# Patient Record
Sex: MALE | Race: WHITE | NOT HISPANIC OR LATINO | Employment: OTHER | URBAN - NONMETROPOLITAN AREA
[De-identification: names, ages, dates, MRNs, and addresses within clinical notes are randomized per-mention and may not be internally consistent; named-entity substitution may affect disease eponyms.]

---

## 2017-05-10 ENCOUNTER — APPOINTMENT (OUTPATIENT)
Dept: LAB | Facility: HOSPITAL | Age: 47
End: 2017-05-10
Attending: INTERNAL MEDICINE

## 2017-05-10 ENCOUNTER — TRANSCRIBE ORDERS (OUTPATIENT)
Dept: ADMINISTRATIVE | Facility: HOSPITAL | Age: 47
End: 2017-05-10

## 2017-05-10 DIAGNOSIS — R74.8 OTHER NONSPECIFIC ABNORMAL SERUM ENZYME LEVELS: Primary | ICD-10-CM

## 2017-05-10 DIAGNOSIS — I10 UNSPECIFIED ESSENTIAL HYPERTENSION: ICD-10-CM

## 2017-05-10 DIAGNOSIS — E78.5 HYPERLIPIDEMIA, UNSPECIFIED HYPERLIPIDEMIA TYPE: ICD-10-CM

## 2017-05-10 LAB
ALBUMIN SERPL-MCNC: 4.2 G/DL (ref 3.5–5)
ALBUMIN/GLOB SERPL: 1.4 G/DL (ref 1.1–2.5)
ALP SERPL-CCNC: 75 U/L (ref 24–120)
ALT SERPL W P-5'-P-CCNC: 35 U/L (ref 0–54)
ANION GAP SERPL CALCULATED.3IONS-SCNC: 13 MMOL/L (ref 4–13)
ARTICHOKE IGE QN: 138 MG/DL (ref 0–99)
AST SERPL-CCNC: 41 U/L (ref 7–45)
BILIRUB SERPL-MCNC: 0.7 MG/DL (ref 0.1–1)
BUN BLD-MCNC: 15 MG/DL (ref 5–21)
BUN/CREAT SERPL: 14.2 (ref 7–25)
CALCIUM SPEC-SCNC: 8.9 MG/DL (ref 8.4–10.4)
CHLORIDE SERPL-SCNC: 103 MMOL/L (ref 98–110)
CHOLEST SERPL-MCNC: 216 MG/DL (ref 130–200)
CO2 SERPL-SCNC: 24 MMOL/L (ref 24–31)
CREAT BLD-MCNC: 1.06 MG/DL (ref 0.5–1.4)
DEPRECATED RDW RBC AUTO: 46.6 FL (ref 40–54)
ERYTHROCYTE [DISTWIDTH] IN BLOOD BY AUTOMATED COUNT: 14.1 % (ref 12–15)
GFR SERPL CREATININE-BSD FRML MDRD: 74 ML/MIN/1.73
GLOBULIN UR ELPH-MCNC: 3 GM/DL
GLUCOSE BLD-MCNC: 86 MG/DL (ref 70–100)
HCT VFR BLD AUTO: 46.1 % (ref 40–52)
HDLC SERPL-MCNC: 44 MG/DL
HGB BLD-MCNC: 16.2 G/DL (ref 14–18)
LDLC/HDLC SERPL: 3.13 {RATIO}
MCH RBC QN AUTO: 32 PG (ref 28–32)
MCHC RBC AUTO-ENTMCNC: 35.1 G/DL (ref 33–36)
MCV RBC AUTO: 90.9 FL (ref 82–95)
PLATELET # BLD AUTO: 256 10*3/MM3 (ref 130–400)
PMV BLD AUTO: 10.7 FL (ref 6–12)
POTASSIUM BLD-SCNC: 4 MMOL/L (ref 3.5–5.3)
PROT SERPL-MCNC: 7.2 G/DL (ref 6.3–8.7)
RBC # BLD AUTO: 5.07 10*6/MM3 (ref 4.8–5.9)
SODIUM BLD-SCNC: 140 MMOL/L (ref 135–145)
TRIGL SERPL-MCNC: 172 MG/DL (ref 0–149)
WBC NRBC COR # BLD: 7.78 10*3/MM3 (ref 4.8–10.8)

## 2017-05-10 PROCEDURE — 80061 LIPID PANEL: CPT | Performed by: INTERNAL MEDICINE

## 2017-05-10 PROCEDURE — 36415 COLL VENOUS BLD VENIPUNCTURE: CPT | Performed by: INTERNAL MEDICINE

## 2017-05-10 PROCEDURE — 80053 COMPREHEN METABOLIC PANEL: CPT | Performed by: INTERNAL MEDICINE

## 2017-05-10 PROCEDURE — 85027 COMPLETE CBC AUTOMATED: CPT | Performed by: INTERNAL MEDICINE

## 2017-05-18 ENCOUNTER — OFFICE VISIT (OUTPATIENT)
Dept: NEUROLOGY | Facility: CLINIC | Age: 47
End: 2017-05-18

## 2017-05-18 VITALS
RESPIRATION RATE: 16 BRPM | HEART RATE: 70 BPM | WEIGHT: 196 LBS | HEIGHT: 70 IN | DIASTOLIC BLOOD PRESSURE: 72 MMHG | SYSTOLIC BLOOD PRESSURE: 120 MMHG | BODY MASS INDEX: 28.06 KG/M2

## 2017-05-18 DIAGNOSIS — I63.89 CEREBROVASCULAR ACCIDENT (CVA) DUE TO OTHER MECHANISM (HCC): Primary | ICD-10-CM

## 2017-05-18 DIAGNOSIS — G40.802 OTHER EPILEPSY WITHOUT STATUS EPILEPTICUS, NOT INTRACTABLE (HCC): ICD-10-CM

## 2017-05-18 PROCEDURE — 99213 OFFICE O/P EST LOW 20 MIN: CPT | Performed by: PSYCHIATRY & NEUROLOGY

## 2017-05-18 RX ORDER — MONTELUKAST SODIUM 10 MG/1
TABLET ORAL
Refills: 5 | COMMUNITY
Start: 2017-04-22 | End: 2023-03-16

## 2017-05-18 RX ORDER — ATORVASTATIN CALCIUM 20 MG/1
TABLET, FILM COATED ORAL
Refills: 3 | COMMUNITY
Start: 2017-05-10 | End: 2023-03-16 | Stop reason: ALTCHOICE

## 2017-05-18 RX ORDER — LABETALOL 200 MG/1
TABLET, FILM COATED ORAL
Refills: 5 | COMMUNITY
Start: 2017-04-25

## 2017-05-18 RX ORDER — FLUTICASONE PROPIONATE 50 MCG
SPRAY, SUSPENSION (ML) NASAL
Refills: 5 | COMMUNITY
Start: 2017-04-25

## 2017-05-18 RX ORDER — ENALAPRIL MALEATE 10 MG/1
TABLET ORAL
Refills: 3 | COMMUNITY
Start: 2017-05-11

## 2017-07-14 ENCOUNTER — OFFICE VISIT (OUTPATIENT)
Dept: INTERNAL MEDICINE | Age: 47
End: 2017-07-14
Payer: COMMERCIAL

## 2017-07-14 VITALS
HEIGHT: 70 IN | HEART RATE: 70 BPM | WEIGHT: 191 LBS | BODY MASS INDEX: 27.35 KG/M2 | OXYGEN SATURATION: 98 % | TEMPERATURE: 98.9 F | SYSTOLIC BLOOD PRESSURE: 120 MMHG | DIASTOLIC BLOOD PRESSURE: 70 MMHG

## 2017-07-14 DIAGNOSIS — Z23 NEED FOR PROPHYLACTIC VACCINATION AGAINST DIPHTHERIA-TETANUS-PERTUSSIS (DTP): ICD-10-CM

## 2017-07-14 DIAGNOSIS — L02.91 ABSCESS: Primary | ICD-10-CM

## 2017-07-14 DIAGNOSIS — M79.644 FINGER PAIN, RIGHT: ICD-10-CM

## 2017-07-14 DIAGNOSIS — L03.011 CELLULITIS OF FINGER OF RIGHT HAND: ICD-10-CM

## 2017-07-14 PROCEDURE — 90471 IMMUNIZATION ADMIN: CPT | Performed by: PHYSICIAN ASSISTANT

## 2017-07-14 PROCEDURE — 10060 I&D ABSCESS SIMPLE/SINGLE: CPT | Performed by: PHYSICIAN ASSISTANT

## 2017-07-14 PROCEDURE — 90715 TDAP VACCINE 7 YRS/> IM: CPT | Performed by: PHYSICIAN ASSISTANT

## 2017-07-14 RX ORDER — ATORVASTATIN CALCIUM 20 MG/1
20 TABLET, FILM COATED ORAL DAILY
COMMUNITY
Start: 2017-05-10 | End: 2018-05-24 | Stop reason: SDUPTHER

## 2017-07-14 RX ORDER — PREGABALIN 75 MG/1
75 CAPSULE ORAL DAILY
COMMUNITY
Start: 2017-05-04 | End: 2020-01-23

## 2017-07-14 RX ORDER — FLUTICASONE PROPIONATE 50 MCG
50 SPRAY, SUSPENSION (ML) NASAL
COMMUNITY
Start: 2017-04-25

## 2017-07-14 RX ORDER — MONTELUKAST SODIUM 10 MG/1
10 TABLET ORAL DAILY
COMMUNITY
Start: 2017-04-22 | End: 2022-06-01

## 2017-07-14 RX ORDER — SULFAMETHOXAZOLE AND TRIMETHOPRIM 200; 40 MG/5ML; MG/5ML
160 SUSPENSION ORAL 2 TIMES DAILY
COMMUNITY
End: 2020-01-23

## 2017-07-14 RX ORDER — VALSARTAN 80 MG/1
80 TABLET ORAL DAILY
Refills: 2 | COMMUNITY
Start: 2017-06-24 | End: 2017-12-15 | Stop reason: SDUPTHER

## 2017-07-14 RX ORDER — LABETALOL 200 MG/1
200 TABLET, FILM COATED ORAL DAILY
COMMUNITY
Start: 2017-04-25 | End: 2018-01-13 | Stop reason: SDUPTHER

## 2017-07-14 RX ORDER — ENALAPRIL MALEATE 10 MG/1
10 TABLET ORAL DAILY
COMMUNITY
Start: 2017-05-11 | End: 2018-02-15 | Stop reason: SDUPTHER

## 2017-07-14 ASSESSMENT — ENCOUNTER SYMPTOMS
NAUSEA: 0
COLOR CHANGE: 0
SORE THROAT: 0
EYE PAIN: 0
VOMITING: 0
RHINORRHEA: 0
CONSTIPATION: 0
DIARRHEA: 0
COUGH: 0
SHORTNESS OF BREATH: 0
BACK PAIN: 0
WHEEZING: 0
EYE REDNESS: 0
PHOTOPHOBIA: 0
CHEST TIGHTNESS: 0
ABDOMINAL PAIN: 0

## 2017-07-14 ASSESSMENT — PATIENT HEALTH QUESTIONNAIRE - PHQ9
1. LITTLE INTEREST OR PLEASURE IN DOING THINGS: 0
SUM OF ALL RESPONSES TO PHQ QUESTIONS 1-9: 0
2. FEELING DOWN, DEPRESSED OR HOPELESS: 0
SUM OF ALL RESPONSES TO PHQ9 QUESTIONS 1 & 2: 0

## 2017-08-07 ENCOUNTER — TELEPHONE (OUTPATIENT)
Dept: NEUROLOGY | Facility: CLINIC | Age: 47
End: 2017-08-07

## 2017-08-07 NOTE — TELEPHONE ENCOUNTER
Mr Troy called to let the office know that he has new insurance now. His old copay for lyrica was $70 per month. He does not know what the new copay will be. I did call him back and mailed him a Lyrica [employer] Pay Assistance Card. He did voice understanding.

## 2017-08-10 ENCOUNTER — TELEPHONE (OUTPATIENT)
Dept: NEUROLOGY | Facility: CLINIC | Age: 47
End: 2017-08-10

## 2017-08-10 NOTE — TELEPHONE ENCOUNTER
Mr. Troy called stating that he still hasn't received the Lyrica card. I told him that it looked like John mailed it out on 08/07/2017 and that it should arrive any day. He agreed with that and said he would just call back in a day or day if he does not get it in the mail.

## 2017-09-06 ENCOUNTER — TELEPHONE (OUTPATIENT)
Dept: NEUROLOGY | Facility: CLINIC | Age: 47
End: 2017-09-06

## 2017-09-08 ENCOUNTER — TELEPHONE (OUTPATIENT)
Dept: NEUROLOGY | Facility: CLINIC | Age: 47
End: 2017-09-08

## 2017-09-08 NOTE — TELEPHONE ENCOUNTER
Mr. Troy called to let the office know that he paid $200 for his Lyrica and can't afford that. I did let him know that I initiated a PA through his insurance.

## 2017-10-26 ENCOUNTER — TELEPHONE (OUTPATIENT)
Dept: NEUROLOGY | Facility: CLINIC | Age: 47
End: 2017-10-26

## 2017-10-26 NOTE — TELEPHONE ENCOUNTER
Patient will need to come in for follow-up to explain January evaluate for possible replacement of the Lyrica.  Patient needs to make sure he continues the Lyrica until a follow-up.  Patient has a follow-up on 11/14 but if the patient is to be worked in sooner let us know.      I did speak with Mr Troy and he did voice understanding. He did state he will see Dr Karimi at his follow up on 11/14.

## 2017-12-06 ENCOUNTER — APPOINTMENT (OUTPATIENT)
Dept: CT IMAGING | Age: 47
End: 2017-12-06

## 2017-12-06 ENCOUNTER — HOSPITAL ENCOUNTER (EMERGENCY)
Age: 47
Discharge: HOME | End: 2017-12-06
Attending: FAMILY MEDICINE

## 2017-12-06 VITALS
WEIGHT: 180 LBS | HEART RATE: 102 BPM | SYSTOLIC BLOOD PRESSURE: 131 MMHG | BODY MASS INDEX: 25.77 KG/M2 | DIASTOLIC BLOOD PRESSURE: 72 MMHG | HEIGHT: 70 IN | OXYGEN SATURATION: 95 % | RESPIRATION RATE: 18 BRPM

## 2017-12-06 DIAGNOSIS — F10.920 ACUTE ALCOHOLIC INTOXICATION WITHOUT COMPLICATION (HCC): Primary | ICD-10-CM

## 2017-12-06 LAB
ALBUMIN SERPL-MCNC: 4.5 G/DL (ref 3.5–5.2)
ALP BLD-CCNC: 101 U/L (ref 40–130)
ALT SERPL-CCNC: 104 U/L (ref 5–41)
AMMONIA: 36 UMOL/L (ref 16–60)
ANION GAP SERPL CALCULATED.3IONS-SCNC: 17 MMOL/L (ref 7–19)
AST SERPL-CCNC: 88 U/L (ref 5–40)
BASOPHILS ABSOLUTE: 0.1 K/UL (ref 0–0.2)
BASOPHILS RELATIVE PERCENT: 1.2 % (ref 0–1)
BILIRUB SERPL-MCNC: 0.4 MG/DL (ref 0.2–1.2)
BUN BLDV-MCNC: 11 MG/DL (ref 6–20)
CALCIUM SERPL-MCNC: 8.5 MG/DL (ref 8.6–10)
CHLORIDE BLD-SCNC: 100 MMOL/L (ref 98–111)
CO2: 26 MMOL/L (ref 22–29)
CREAT SERPL-MCNC: 0.8 MG/DL (ref 0.5–1.2)
EOSINOPHILS ABSOLUTE: 0.1 K/UL (ref 0–0.6)
EOSINOPHILS RELATIVE PERCENT: 1.1 % (ref 0–5)
ETHANOL: 386 MG/DL (ref 0–0.08)
GFR NON-AFRICAN AMERICAN: >60
GLUCOSE BLD-MCNC: 87 MG/DL (ref 74–109)
HCT VFR BLD CALC: 50.3 % (ref 42–52)
HEMOGLOBIN: 17.5 G/DL (ref 14–18)
LIPASE: 41 U/L (ref 13–60)
LYMPHOCYTES ABSOLUTE: 2.3 K/UL (ref 1.1–4.5)
LYMPHOCYTES RELATIVE PERCENT: 35.2 % (ref 20–40)
MCH RBC QN AUTO: 32.4 PG (ref 27–31)
MCHC RBC AUTO-ENTMCNC: 34.8 G/DL (ref 33–37)
MCV RBC AUTO: 93.1 FL (ref 80–94)
MONOCYTES ABSOLUTE: 0.4 K/UL (ref 0–0.9)
MONOCYTES RELATIVE PERCENT: 5.9 % (ref 0–10)
NEUTROPHILS ABSOLUTE: 3.7 K/UL (ref 1.5–7.5)
NEUTROPHILS RELATIVE PERCENT: 56.1 % (ref 50–65)
PDW BLD-RTO: 13.7 % (ref 11.5–14.5)
PLATELET # BLD: 232 K/UL (ref 130–400)
PMV BLD AUTO: 9.2 FL (ref 9.4–12.4)
POTASSIUM SERPL-SCNC: 4.3 MMOL/L (ref 3.5–5)
RBC # BLD: 5.4 M/UL (ref 4.7–6.1)
SODIUM BLD-SCNC: 143 MMOL/L (ref 136–145)
TOTAL PROTEIN: 7.3 G/DL (ref 6.6–8.7)
WBC # BLD: 6.6 K/UL (ref 4.8–10.8)

## 2017-12-06 PROCEDURE — 2580000003 HC RX 258: Performed by: FAMILY MEDICINE

## 2017-12-06 PROCEDURE — 93005 ELECTROCARDIOGRAM TRACING: CPT

## 2017-12-06 PROCEDURE — 83690 ASSAY OF LIPASE: CPT

## 2017-12-06 PROCEDURE — G0480 DRUG TEST DEF 1-7 CLASSES: HCPCS

## 2017-12-06 PROCEDURE — 82140 ASSAY OF AMMONIA: CPT

## 2017-12-06 PROCEDURE — 70450 CT HEAD/BRAIN W/O DYE: CPT

## 2017-12-06 PROCEDURE — 99285 EMERGENCY DEPT VISIT HI MDM: CPT

## 2017-12-06 PROCEDURE — 99283 EMERGENCY DEPT VISIT LOW MDM: CPT | Performed by: FAMILY MEDICINE

## 2017-12-06 PROCEDURE — 85025 COMPLETE CBC W/AUTO DIFF WBC: CPT

## 2017-12-06 PROCEDURE — 80053 COMPREHEN METABOLIC PANEL: CPT

## 2017-12-06 PROCEDURE — 36415 COLL VENOUS BLD VENIPUNCTURE: CPT

## 2017-12-06 RX ORDER — SODIUM CHLORIDE 9 MG/ML
INJECTION, SOLUTION INTRAVENOUS CONTINUOUS
Status: DISCONTINUED | OUTPATIENT
Start: 2017-12-06 | End: 2017-12-06 | Stop reason: HOSPADM

## 2017-12-06 RX ADMIN — SODIUM CHLORIDE: 9 INJECTION, SOLUTION INTRAVENOUS at 16:22

## 2017-12-06 RX ADMIN — SODIUM CHLORIDE: 9 INJECTION, SOLUTION INTRAVENOUS at 16:00

## 2017-12-06 ASSESSMENT — ENCOUNTER SYMPTOMS
ABDOMINAL DISTENTION: 0
COUGH: 0
SORE THROAT: 0
CONSTIPATION: 0
SHORTNESS OF BREATH: 0
ABDOMINAL PAIN: 0
CHEST TIGHTNESS: 0
BACK PAIN: 0
VOMITING: 0
WHEEZING: 0
APNEA: 0
DIARRHEA: 0
TROUBLE SWALLOWING: 0

## 2017-12-06 NOTE — ED PROVIDER NOTES
[facility] EMERGENCY DEPT  eMERGENCY dEPARTMENT eNCOUnter      Pt Name: Remi Cranker  MRN: [id number]  [place] 1970  Date of evaluation: 12/6/2017  Provider: Dewey Cano MD    [address] [place] [address]       Chief Complaint   Patient presents with    Altered Mental Status         HISTORY OF PRESENT ILLNESS   (Location/Symptom, Timing/Onset, Context/Setting, Quality, Duration, Modifying Factors, Severity)  Note limiting factors. Remi Cranker is a 50 y.o. male who presents to the emergency department . Patient complains one-day history of intermittent confusion. He is a heavy drinker. He states he has been drinking more heavily the last 24-48 hours. He denies any other complaints at this time. He denies any injury. HPI    Nursing Notes were reviewed. REVIEW OF SYSTEMS    (2-9 systems for level 4, 10 or more for level 5)     Review of Systems   Constitutional: Negative for diaphoresis and fever. HENT: Negative for sore throat and trouble swallowing. Eyes: Negative for visual disturbance. Respiratory: Negative for apnea, cough, chest tightness, shortness of breath and wheezing. Cardiovascular: Negative for chest pain, palpitations and leg swelling. Gastrointestinal: Negative for abdominal distention, abdominal pain, constipation, diarrhea and vomiting. Musculoskeletal: Negative for back pain and myalgias. Skin: Negative for pallor. Neurological: Negative for dizziness, weakness, light-headedness and headaches. Mild confusion   Psychiatric/Behavioral: Negative for behavioral problems and suicidal ideas. All other systems reviewed and are negative. PAST MEDICAL HISTORY     Past Medical History:   Diagnosis Date    Asthma     Cerebral artery occlusion with cerebral infarction ([facility] [address]     Hypertension          SURGICAL HISTORY     History reviewed. No pertinent surgical history.       CURRENT MEDICATIONS       Previous Medications    ATORVASTATIN (LIPITOR) 20 MG TABLET    Take 20 mg by mouth daily    ENALAPRIL (VASOTEC) 10 MG TABLET    Take 10 mg by mouth daily    FLUTICASONE (FLONASE) 50 MCG/ACT NASAL SPRAY    50 sprays    LABETALOL (NORMODYNE) 200 MG TABLET    Take 200 mg by mouth daily    MONTELUKAST (SINGULAIR) 10 MG TABLET    Take 10 mg by mouth daily    PREGABALIN (LYRICA) 75 MG CAPSULE    Take 75 mg by mouth daily    SULFAMETHOXAZOLE-TRIMETHOPRIM (BACTRIM;SEPTRA) 200-40 MG/5ML SUSPENSION    Take 160 mg by mouth 2 times daily    VALSARTAN (DIOVAN) 80 MG TABLET    Take 80 mg by mouth daily       ALLERGIES     Phenytoin    FAMILY HISTORY     History reviewed. No pertinent family history. SOCIAL HISTORY       Social History     Social History    Marital status: Single     Spouse name: N/A    Number of children: N/A    Years of education: N/A     Social History Main Topics    Smoking status: Never Smoker    Smokeless tobacco: None    Alcohol use None    Drug use: Unknown    Sexual activity: Not Asked     Other Topics Concern    None     Social History Narrative    None       SCREENINGS             PHYSICAL EXAM    (up to 7 for level 4, 8 or more for level 5)     ED Triage Vitals [12/06/17 1511]   BP Temp Temp src Pulse Resp SpO2 Height Weight   (!) 156/108 -- -- 98 16 95 % 5' 10\" (1.778 m) 180 lb (81.6 kg)       Physical Exam   Constitutional: He is oriented to person, place, and time. He appears well-developed and well-nourished. HENT:   Head: Normocephalic and atraumatic. Eyes: Conjunctivae and EOM are normal. Pupils are equal, round, and reactive to light. Neck: Normal range of motion. Neck supple. No tracheal deviation present. Cardiovascular: Normal rate, regular rhythm, normal heart sounds and intact distal pulses. Pulmonary/Chest: Effort normal and breath sounds normal. No respiratory distress. He has no wheezes. He has no rales. Abdominal: Soft. Bowel sounds are normal.   Musculoskeletal: Normal range of motion.    Neurological: He is alert and oriented to person, place, and time. Skin: Skin is warm and dry. Psychiatric: He has a normal mood and affect. His behavior is normal. Thought content normal.   Nursing note and vitals reviewed. DIAGNOSTIC RESULTS     EKG: All EKG's are interpreted by the Emergency Department Physician who either signs or Co-signs this chart in the absence of a cardiologist.    Normal sinus rhythm, heart rate 90, no ischemic ST-T changes    RADIOLOGY:   Non-plain film images such as CT, Ultrasound and MRI are read by the radiologist. Plain radiographic images are visualized and preliminarily interpreted by the emergency physician with the below findings:    See below      CT Head WO Contrast   Final Result   1. No acute intracranial process. Signed by Dr Lucas Ross on 12/6/2017 4:10 PM              LABS:  Labs Reviewed   CBC WITH AUTO DIFFERENTIAL - Abnormal; Notable for the following:        Result Value    MCH 32.4 (*)     MPV 9.2 (*)     Basophils % 1.2 (*)     All other components within normal limits   COMPREHENSIVE METABOLIC PANEL - Abnormal; Notable for the following:     Calcium 8.5 (*)      (*)     AST 88 (*)     All other components within normal limits   LIPASE   ETHANOL   AMMONIA   URINALYSIS   URINE DRUG SCREEN       All other labs were within normal range or not returned as of this dictation. EMERGENCY DEPARTMENT COURSE and DIFFERENTIAL DIAGNOSIS/MDM:   Vitals:    Vitals:    12/06/17 1511   BP: (!) 156/108   Pulse: 98   Resp: 16   SpO2: 95%   Weight: 180 lb (81.6 kg)   Height: 5' 10\" (1.778 m)       MDM  Number of Diagnoses or Management Options     Amount and/or Complexity of Data Reviewed  Clinical lab tests: ordered and reviewed  Tests in the radiology section of CPT®: ordered and reviewed    Risk of Complications, Morbidity, and/or Mortality  Presenting problems: low    Patient Progress  Patient progress: improved      Reassessment  Patient stable throughout ED stay. Alert and oriented ×3. He will be discharged home. Confirm that patient has a ride. Patient advised to decrease his drinking and follow-up with his primary care physician as needed. Also advised to keep an eye on his blood pressure. CONSULTS:  None    PROCEDURES:  Unless otherwise noted below, none     Procedures    FINAL IMPRESSION      1.  Acute alcoholic intoxication without complication [facility])          DISPOSITION/PLAN   DISPOSITION Decision to Discharge    PATIENT REFERRED TO:  MD Abe Dejesus [phone]    In 3 days        DISCHARGE MEDICATIONS:  New Prescriptions    No medications on file          (Please note that portions of this note were completed with a voice recognition program.  Efforts were made to edit the dictations but occasionally words are mis-transcribed.)    Ricky Bustillo MD (electronically signed)  Attending Emergency Physician          Ricky Bustillo MD  12/06/17 4775

## 2017-12-11 RX ORDER — M-VIT,TX,IRON,MINS/CALC/FOLIC 27MG-0.4MG
1 TABLET ORAL DAILY
COMMUNITY

## 2017-12-15 RX ORDER — VALSARTAN 80 MG/1
TABLET ORAL
Qty: 90 TABLET | Refills: 2 | Status: SHIPPED | OUTPATIENT
Start: 2017-12-15 | End: 2020-01-23

## 2017-12-18 ENCOUNTER — TELEPHONE (OUTPATIENT)
Dept: INTERNAL MEDICINE | Age: 47
End: 2017-12-18

## 2017-12-18 NOTE — TELEPHONE ENCOUNTER
Pharmacy has contacted Dr Karimi to request a refill for Marissa NEELY printed and reviewed by Dr Karimi.

## 2017-12-19 ENCOUNTER — TELEPHONE (OUTPATIENT)
Dept: NEUROLOGY | Facility: CLINIC | Age: 47
End: 2017-12-19

## 2018-01-15 RX ORDER — LABETALOL 200 MG/1
TABLET, FILM COATED ORAL
Qty: 30 TABLET | Refills: 5 | Status: SHIPPED | OUTPATIENT
Start: 2018-01-15 | End: 2019-02-21 | Stop reason: SDUPTHER

## 2018-01-18 ENCOUNTER — OFFICE VISIT (OUTPATIENT)
Dept: NEUROLOGY | Facility: CLINIC | Age: 48
End: 2018-01-18

## 2018-01-18 VITALS
BODY MASS INDEX: 24.92 KG/M2 | DIASTOLIC BLOOD PRESSURE: 80 MMHG | HEIGHT: 71 IN | RESPIRATION RATE: 18 BRPM | WEIGHT: 178 LBS | HEART RATE: 72 BPM | SYSTOLIC BLOOD PRESSURE: 130 MMHG

## 2018-01-18 DIAGNOSIS — G40.802 OTHER EPILEPSY WITHOUT STATUS EPILEPTICUS, NOT INTRACTABLE (HCC): Primary | ICD-10-CM

## 2018-01-18 PROCEDURE — 99214 OFFICE O/P EST MOD 30 MIN: CPT | Performed by: PSYCHIATRY & NEUROLOGY

## 2018-01-18 RX ORDER — PREGABALIN 75 MG/1
75 CAPSULE ORAL 2 TIMES DAILY
Qty: 60 CAPSULE | Refills: 2 | Status: SHIPPED | OUTPATIENT
Start: 2018-01-18 | End: 2018-12-05

## 2018-01-18 RX ORDER — VALSARTAN 40 MG/1
80 TABLET ORAL DAILY
COMMUNITY
End: 2020-10-20 | Stop reason: ALTCHOICE

## 2018-01-18 NOTE — PROGRESS NOTES
Subjective   Nathaniel Troy, 1970, is a male who is being seen today for   Chief Complaint   Patient presents with   • Stroke       HISTORY OF PRESENT ILLNESS: Patient with history of dominant CVA and focal and secondarily generalized seizures.  Patient has been on Lyrica 75 mg by mouth twice a day.  He is had to pay over $400 a month for it.  We are trying to get it preapproved again.  If not approved we are going to have to change medications to possibly Keppra.  Side effects should were discussed in detail for the Keppra.  Patient is had no headaches no seizures.  Patient has not had any blood work done over a year.    REVIEW OF SYSTEMS:   GENERAL: As above  PULMONARY: No acute shortness of breath  CVS:  No chest pain or palpitation  GASTROINTESTINAL: No acute GI distress  GENITOURINARY: No acute  distress  GYN: Not applicable  MUSCULOSKELETAL: No acute musculoskeletal symptoms  HEENT:  No acute vision or hearing change  ENDOCRINE:  No acute endocrine changes  PSYCHIATRIC: No acute psychiatric symptoms  HEMATOLOGY: No blood work available  SKIN: No skin changes  Family history reviewed and otherwise noncontributory  Social history: Patient denies smoking or drug or alcohol use    PHYSICAL EXAMINATION:    GENERAL: No acute distress  CRANIUM: Normocephalic/atraumatic  HEENT: No acute fundic abnormalities.  Pupils equal round reactive to light.       EYES: EOMs intact without nystagmus and fields full to confrontation       EARS:  Tympanic membranes normal hears tuning fork bilaterally       THROAT: No oropharynx abnormalities       NECK:  No bruits/no lymphadenopathy  CHEST: No acute cardiopulmonary abnormalities by auscultation  ABDOMEN: Nondistended  EXTREMITIES: Pulses symmetrical  NEURO: Patient alert and follows commands without difficulty  SPEECH:  Normal    CRANIAL NERVES:  Motor sensory about the face normal and symmetric    MOTOR STRENGTH:  Motor strength upper and lower extremities normal  STATION  AND GAIT:  Gait normal/Romberg negative  CEREBELLAR:  Finger-nose and heel shin normal  SENSORY:  Pin and vibration upper and lower extremities normal  REFLEXES:  Reflexes normal upper and lower extremities without Babinski's      ASSESSMENT AND PLAN:  Patient with history of CVA with history of focal secondary generalized seizures.  Patient to get blood work and continue Lyrica if approved.      Nathaniel was seen today for stroke.    Diagnoses and all orders for this visit:    Other epilepsy without status epilepticus, not intractable  -     CBC & Differential; Future  -     Comprehensive Metabolic Panel; Future    Other orders  -     pregabalin (LYRICA) 75 MG capsule; Take 1 capsule by mouth 2 (Two) Times a Day.

## 2018-01-22 ENCOUNTER — TELEPHONE (OUTPATIENT)
Dept: NEUROLOGY | Facility: CLINIC | Age: 48
End: 2018-01-22

## 2018-01-22 NOTE — TELEPHONE ENCOUNTER
I spoke with Nathaniel Troy regarding the denial of his Lyrica. I let him know that Dr. Karimi is going to start him on Keppra; however, the blood work that was ordered on 01/18/2018 has to be completed before the Keppra can be started. He said that he planned to have these done (tomorrow) 01/23/2018. I told him that as soon as the results are reviewed, Dr. Karimi will call in the Keppra and his dose of Lyrica will change to 1 Capsule by mouth once daily for 1 week. And the Keppra will be 500 mg by mouth twice daily for 2 weeks. At 2 weeks, there will be a Keppra level ordered for him and with those results, the dose may change again. I explained that whoever calls him, in the next few days, will go over all this information again.    He expressed understanding and will let us know when he goes tomorrow to have his blood work completed.

## 2018-01-30 ENCOUNTER — LAB (OUTPATIENT)
Dept: LAB | Facility: HOSPITAL | Age: 48
End: 2018-01-30
Attending: PSYCHIATRY & NEUROLOGY

## 2018-01-30 DIAGNOSIS — G40.802 OTHER EPILEPSY WITHOUT STATUS EPILEPTICUS, NOT INTRACTABLE (HCC): ICD-10-CM

## 2018-01-30 LAB
ALBUMIN SERPL-MCNC: 4 G/DL (ref 3.5–5)
ALBUMIN/GLOB SERPL: 1.6 G/DL (ref 1.1–2.5)
ALP SERPL-CCNC: 125 U/L (ref 24–120)
ALT SERPL W P-5'-P-CCNC: 72 U/L (ref 0–54)
ANION GAP SERPL CALCULATED.3IONS-SCNC: 10 MMOL/L (ref 4–13)
AST SERPL-CCNC: 74 U/L (ref 7–45)
BASOPHILS # BLD AUTO: 0.15 10*3/MM3 (ref 0–0.2)
BASOPHILS NFR BLD AUTO: 2.3 % (ref 0–2)
BILIRUB SERPL-MCNC: 0.7 MG/DL (ref 0.1–1)
BUN BLD-MCNC: 15 MG/DL (ref 5–21)
BUN/CREAT SERPL: 19.7 (ref 7–25)
CALCIUM SPEC-SCNC: 9 MG/DL (ref 8.4–10.4)
CHLORIDE SERPL-SCNC: 106 MMOL/L (ref 98–110)
CO2 SERPL-SCNC: 25 MMOL/L (ref 24–31)
CREAT BLD-MCNC: 0.76 MG/DL (ref 0.5–1.4)
DEPRECATED RDW RBC AUTO: 47.8 FL (ref 40–54)
EOSINOPHIL # BLD AUTO: 0.78 10*3/MM3 (ref 0–0.7)
EOSINOPHIL NFR BLD AUTO: 11.9 % (ref 0–4)
ERYTHROCYTE [DISTWIDTH] IN BLOOD BY AUTOMATED COUNT: 13.4 % (ref 12–15)
GFR SERPL CREATININE-BSD FRML MDRD: 109 ML/MIN/1.73
GLOBULIN UR ELPH-MCNC: 2.5 GM/DL
GLUCOSE BLD-MCNC: 80 MG/DL (ref 70–100)
HCT VFR BLD AUTO: 45.3 % (ref 40–52)
HGB BLD-MCNC: 15 G/DL (ref 14–18)
IMM GRANULOCYTES # BLD: 0.03 10*3/MM3 (ref 0–0.03)
IMM GRANULOCYTES NFR BLD: 0.5 % (ref 0–5)
LYMPHOCYTES # BLD AUTO: 1.69 10*3/MM3 (ref 0.72–4.86)
LYMPHOCYTES NFR BLD AUTO: 25.8 % (ref 15–45)
MCH RBC QN AUTO: 32.1 PG (ref 28–32)
MCHC RBC AUTO-ENTMCNC: 33.1 G/DL (ref 33–36)
MCV RBC AUTO: 97 FL (ref 82–95)
MONOCYTES # BLD AUTO: 0.66 10*3/MM3 (ref 0.19–1.3)
MONOCYTES NFR BLD AUTO: 10.1 % (ref 4–12)
NEUTROPHILS # BLD AUTO: 3.23 10*3/MM3 (ref 1.87–8.4)
NEUTROPHILS NFR BLD AUTO: 49.4 % (ref 39–78)
NRBC BLD MANUAL-RTO: 0 /100 WBC (ref 0–0)
PLATELET # BLD AUTO: 220 10*3/MM3 (ref 130–400)
PMV BLD AUTO: 10.5 FL (ref 6–12)
POTASSIUM BLD-SCNC: 4.5 MMOL/L (ref 3.5–5.3)
PROT SERPL-MCNC: 6.5 G/DL (ref 6.3–8.7)
RBC # BLD AUTO: 4.67 10*6/MM3 (ref 4.8–5.9)
SODIUM BLD-SCNC: 141 MMOL/L (ref 135–145)
WBC NRBC COR # BLD: 6.54 10*3/MM3 (ref 4.8–10.8)

## 2018-01-30 PROCEDURE — 80053 COMPREHEN METABOLIC PANEL: CPT | Performed by: PSYCHIATRY & NEUROLOGY

## 2018-01-30 PROCEDURE — 85025 COMPLETE CBC W/AUTO DIFF WBC: CPT | Performed by: PSYCHIATRY & NEUROLOGY

## 2018-01-30 PROCEDURE — 36415 COLL VENOUS BLD VENIPUNCTURE: CPT

## 2018-02-01 ENCOUNTER — TELEPHONE (OUTPATIENT)
Dept: NEUROLOGY | Facility: CLINIC | Age: 48
End: 2018-02-01

## 2018-02-05 DIAGNOSIS — Z00.00 EXAMINATION, MEDICAL, GENERAL: Primary | ICD-10-CM

## 2018-02-15 RX ORDER — ENALAPRIL MALEATE 10 MG/1
TABLET ORAL
Qty: 180 TABLET | Refills: 2 | Status: SHIPPED | OUTPATIENT
Start: 2018-02-15 | End: 2019-03-13 | Stop reason: SDUPTHER

## 2018-02-19 LAB
EKG P AXIS: 59 DEGREES
EKG P-R INTERVAL: 136 MS
EKG Q-T INTERVAL: 354 MS
EKG QRS DURATION: 84 MS
EKG QTC CALCULATION (BAZETT): 413 MS
EKG T AXIS: 57 DEGREES

## 2018-03-29 ENCOUNTER — TELEPHONE (OUTPATIENT)
Dept: INTERNAL MEDICINE | Age: 48
End: 2018-03-29

## 2018-04-04 ENCOUNTER — TELEPHONE (OUTPATIENT)
Dept: INTERNAL MEDICINE | Age: 48
End: 2018-04-04

## 2018-05-22 ENCOUNTER — TELEPHONE (OUTPATIENT)
Dept: INTERNAL MEDICINE | Age: 48
End: 2018-05-22

## 2018-05-22 RX ORDER — LEVETIRACETAM 500 MG/1
500 TABLET ORAL 2 TIMES DAILY
COMMUNITY
Start: 2018-05-09 | End: 2021-09-01

## 2018-08-09 RX ORDER — LEVETIRACETAM 500 MG/1
TABLET ORAL
Qty: 60 TABLET | Refills: 0 | Status: SHIPPED | OUTPATIENT
Start: 2018-08-09 | End: 2018-10-26 | Stop reason: SDUPTHER

## 2018-10-26 ENCOUNTER — DOCUMENTATION (OUTPATIENT)
Dept: NEUROLOGY | Facility: CLINIC | Age: 48
End: 2018-10-26

## 2018-10-26 ENCOUNTER — TELEPHONE (OUTPATIENT)
Dept: NEUROLOGY | Facility: CLINIC | Age: 48
End: 2018-10-26

## 2018-10-26 DIAGNOSIS — G40.802 OTHER EPILEPSY WITHOUT STATUS EPILEPTICUS, NOT INTRACTABLE (HCC): Primary | ICD-10-CM

## 2018-10-26 RX ORDER — LEVETIRACETAM 500 MG/1
TABLET ORAL
Qty: 60 TABLET | Refills: 0 | Status: SHIPPED | OUTPATIENT
Start: 2018-10-26 | End: 2018-11-30 | Stop reason: SDUPTHER

## 2018-10-26 NOTE — TELEPHONE ENCOUNTER
----- Message from Edis Karimi MD sent at 10/26/2018  1:23 PM CDT -----  Contact patient that we will give the patient 1 more month of Keppra but we need to get him in to a cancellation follow-up.  Patient can follow-up with Irene.  Patient also needs to get blood work checking a Keppra level and CBC and CMP which I put in epic.  Did patient have her follow up with PCP about his liver enzyme abnormalities?

## 2018-10-26 NOTE — PROGRESS NOTES
Patient according to his conversation with a nurse in our office missed multiple appointments because of family matters.  Patient is given 1 more month of the Keppra and is going to be made a follow-up within the next month.  Patient has not gotten his blood work and is set up for a Keppra level /CBC and CMP.  He was to follow with his PCP on his liver enzyme abnormalities.

## 2018-10-26 NOTE — TELEPHONE ENCOUNTER
Nathaniel said he missed his last 2 appointments because he had some family issues.  He scheduled an appointment in January.

## 2018-10-26 NOTE — TELEPHONE ENCOUNTER
"I spoke with Nathaniel and told him we were able to get him in on 11/7/2018 at 3:00 PM. He said that he will be there. I mentioned the importance of coming in because he has \"no-showed\" several appointments in the past and he got very defensive stating that he couldn't help all those missed ones because his mother had passed and had other family situations that came up on those other dates/times that prevented him to to make it to the other missed ones. He then stated that he \"didnt want to hear of any of that nonsense for missing appointments\". I told him again that it was important if he wanted to get his medicine refilled. He said \"okay\" and I also reminded him to go to the labs a few days before his appointment and he stated that he would and hung up.   "

## 2018-10-26 NOTE — TELEPHONE ENCOUNTER
Appointment scheduled 11-7-18.  Nathaniel said he is in the [place] right now.  He will contact his PCP for a follow up as soon as possible to discuss his liver enzymes.

## 2018-11-12 ENCOUNTER — LAB (OUTPATIENT)
Dept: LAB | Facility: HOSPITAL | Age: 48
End: 2018-11-12
Attending: PSYCHIATRY & NEUROLOGY

## 2018-11-12 DIAGNOSIS — G40.802 OTHER EPILEPSY WITHOUT STATUS EPILEPTICUS, NOT INTRACTABLE (HCC): ICD-10-CM

## 2018-11-12 LAB
ALBUMIN SERPL-MCNC: 4.3 G/DL (ref 3.5–5)
ALBUMIN/GLOB SERPL: 1.7 G/DL (ref 1.1–2.5)
ALP SERPL-CCNC: 122 U/L (ref 24–120)
ALT SERPL W P-5'-P-CCNC: 63 U/L (ref 0–54)
ANION GAP SERPL CALCULATED.3IONS-SCNC: 9 MMOL/L (ref 4–13)
AST SERPL-CCNC: 100 U/L (ref 7–45)
BASOPHILS # BLD AUTO: 0.07 10*3/MM3 (ref 0–0.2)
BASOPHILS NFR BLD AUTO: 1.3 % (ref 0–2)
BILIRUB SERPL-MCNC: 0.9 MG/DL (ref 0.1–1)
BUN BLD-MCNC: 12 MG/DL (ref 5–21)
BUN/CREAT SERPL: 16.2 (ref 7–25)
CALCIUM SPEC-SCNC: 9.1 MG/DL (ref 8.4–10.4)
CHLORIDE SERPL-SCNC: 103 MMOL/L (ref 98–110)
CO2 SERPL-SCNC: 27 MMOL/L (ref 24–31)
CREAT BLD-MCNC: 0.74 MG/DL (ref 0.5–1.4)
DEPRECATED RDW RBC AUTO: 47.9 FL (ref 40–54)
EOSINOPHIL # BLD AUTO: 0.2 10*3/MM3 (ref 0–0.7)
EOSINOPHIL NFR BLD AUTO: 3.7 % (ref 0–4)
ERYTHROCYTE [DISTWIDTH] IN BLOOD BY AUTOMATED COUNT: 12.9 % (ref 12–15)
GFR SERPL CREATININE-BSD FRML MDRD: 112 ML/MIN/1.73
GLOBULIN UR ELPH-MCNC: 2.6 GM/DL
GLUCOSE BLD-MCNC: 97 MG/DL (ref 70–100)
HCT VFR BLD AUTO: 41.7 % (ref 40–52)
HGB BLD-MCNC: 14 G/DL (ref 14–18)
IMM GRANULOCYTES # BLD: 0.02 10*3/MM3 (ref 0–0.03)
IMM GRANULOCYTES NFR BLD: 0.4 % (ref 0–5)
LYMPHOCYTES # BLD AUTO: 1.07 10*3/MM3 (ref 0.72–4.86)
LYMPHOCYTES NFR BLD AUTO: 19.6 % (ref 15–45)
MCH RBC QN AUTO: 33.8 PG (ref 28–32)
MCHC RBC AUTO-ENTMCNC: 33.6 G/DL (ref 33–36)
MCV RBC AUTO: 100.7 FL (ref 82–95)
MONOCYTES # BLD AUTO: 0.59 10*3/MM3 (ref 0.19–1.3)
MONOCYTES NFR BLD AUTO: 10.8 % (ref 4–12)
NEUTROPHILS # BLD AUTO: 3.52 10*3/MM3 (ref 1.87–8.4)
NEUTROPHILS NFR BLD AUTO: 64.2 % (ref 39–78)
NRBC BLD MANUAL-RTO: 0 /100 WBC (ref 0–0)
PLATELET # BLD AUTO: 158 10*3/MM3 (ref 130–400)
PMV BLD AUTO: 10.4 FL (ref 6–12)
POTASSIUM BLD-SCNC: 4.4 MMOL/L (ref 3.5–5.3)
PROT SERPL-MCNC: 6.9 G/DL (ref 6.3–8.7)
RBC # BLD AUTO: 4.14 10*6/MM3 (ref 4.8–5.9)
SODIUM BLD-SCNC: 139 MMOL/L (ref 135–145)
WBC NRBC COR # BLD: 5.47 10*3/MM3 (ref 4.8–10.8)

## 2018-11-12 PROCEDURE — 80053 COMPREHEN METABOLIC PANEL: CPT | Performed by: PSYCHIATRY & NEUROLOGY

## 2018-11-12 PROCEDURE — 80177 DRUG SCRN QUAN LEVETIRACETAM: CPT | Performed by: PSYCHIATRY & NEUROLOGY

## 2018-11-12 PROCEDURE — 85025 COMPLETE CBC W/AUTO DIFF WBC: CPT | Performed by: PSYCHIATRY & NEUROLOGY

## 2018-11-12 PROCEDURE — 36415 COLL VENOUS BLD VENIPUNCTURE: CPT

## 2018-11-15 LAB — LEVETIRACETAM SERPL-MCNC: 15.5 UG/ML (ref 10–40)

## 2018-12-03 RX ORDER — LEVETIRACETAM 500 MG/1
TABLET ORAL
Qty: 60 TABLET | Refills: 0 | Status: SHIPPED | OUTPATIENT
Start: 2018-12-03 | End: 2018-12-05 | Stop reason: SDUPTHER

## 2018-12-05 ENCOUNTER — OFFICE VISIT (OUTPATIENT)
Dept: NEUROLOGY | Facility: CLINIC | Age: 48
End: 2018-12-05

## 2018-12-05 VITALS
BODY MASS INDEX: 23.52 KG/M2 | RESPIRATION RATE: 18 BRPM | WEIGHT: 168 LBS | HEART RATE: 74 BPM | HEIGHT: 71 IN | DIASTOLIC BLOOD PRESSURE: 68 MMHG | SYSTOLIC BLOOD PRESSURE: 134 MMHG

## 2018-12-05 DIAGNOSIS — G40.409 OTHER GENERALIZED EPILEPSY, NOT INTRACTABLE, WITHOUT STATUS EPILEPTICUS (HCC): Primary | ICD-10-CM

## 2018-12-05 PROCEDURE — 99214 OFFICE O/P EST MOD 30 MIN: CPT | Performed by: PSYCHIATRY & NEUROLOGY

## 2018-12-05 RX ORDER — LEVETIRACETAM 500 MG/1
500 TABLET ORAL 2 TIMES DAILY
Qty: 60 TABLET | Refills: 5 | Status: SHIPPED | OUTPATIENT
Start: 2018-12-05 | End: 2019-09-01 | Stop reason: SDUPTHER

## 2018-12-05 NOTE — PATIENT INSTRUCTIONS
To continue seizure precautions and driving precautions as previously discussed.  Patient to get with PCP about elevated liver enzymes and MCV on CBC.

## 2018-12-05 NOTE — PROGRESS NOTES
"Subjective   Nathaniel Troy, 1970, is a male who is being seen today for   Chief Complaint   Patient presents with   • Stroke       HISTORY OF PRESENT ILLNESS: Extended follow-up.  Patient has not been seen in almost a year.  Patient has had no seizures and no headaches.  Patient had no stroke symptoms.  Patient was switched over from Lyrica to Keppra 500 by mouth twice a day because of the cost of the Lyrica.  Patient is tolerating the Keppra without difficulty.  Patient has been through a lot of stress with his family.  Patient says his not sleeping as well as he should because he is drinking coffee.  Patient is \"taking too many vitamins\".  Patient has not seen his PCP lately.  Patient had some further elevation of AST at 74 to 100 in the most recent testing ,ALT has decreased from 72-63.  Patient has not been working.  Patient  had no trouble driving.  I again reviewed driving precautions and safety precautions/seizure precautions.    REVIEW OF SYSTEMS:   GENERAL: Blood pressure 134/68 left arm seated with pulse 74.  Patient's blood pressure standing 132/72  PULMONARY: No acute respiratory difficulties  CVS:  No acute chest pain or palpitation  GASTROINTESTINAL: As above  GENITOURINARY: No acute  distress  GYN: Not applicable  MUSCULOSKELETAL: No acute musculoskeletal symptoms  HEENT:  No acute vision or hearing change  ENDOCRINE:  No acute endocrine symptoms  PSYCHIATRIC: As above  HEMATOLOGY: No anemia  SKIN: No skin changes  Family history positive for tremor but otherwise noncontributory  Social history: Patient denies smoking or drug or alcohol use      PHYSICAL EXAMINATION:    GENERAL: Patient has intentional tremor.  Patient relates that to his lack of sleep and coffee.  No cogwheeling or rigidity noted no rest tremor noted  CRANIUM: Normocephalic/atraumatic  HEENT: No acute fundic abnormalities.  Pupils equal round reactive to light.       EYES: EOMs intact without nystagmus and fields full to " confrontation       EARS:  Tympanic membranes normal and hears tuning fork bilaterally       THROAT: No oropharynx abnormalities       NECK:  No bruits/no lymphadenopathy  CHEST: No acute cardiopulmonary abnormalities by auscultation  ABDOMEN: Nondistended  EXTREMITIES: Pulses symmetrical.  NEURO: Patient alert and follows commands without difficulty  SPEECH:  Normal    CRANIAL NERVES:  Motor sensory about the face normal and symmetric    MOTOR STRENGTH:  Motor strength upper and lower extremities normal  STATION AND GAIT:  Gait normal/Romberg negative.  Patient has no significant difficulty with tandem walk  CEREBELLAR:  Finger-nose and heel shin normal within the tremor  SENSORY:  Pin and vibration upper and lower extremities normal  REFLEXES:  Reflexes present and symmetric upper and lower extremities without Babinski's or clonus      ASSESSMENT AND PLAN:  Patient with history of dominant CVA with hemorrhagic component.  Patient had his seizure risk after that and has been on previously Lyrica switched to Keppra.  Patient tolerating the Keppra with no seizures.  Patient to work on tapering caffeine and get better sleep habits.  Patient is to get with PCP about liver enzyme abnormalities and elevated MCV.  I spent 25 minutes with this patient with 15 minutes counseling. Patient's Body mass index is 23.43 kg/m². BMI is within normal parameters. No follow-up required.      Nathaniel was seen today for stroke.    Diagnoses and all orders for this visit:    Other generalized epilepsy, not intractable, without status epilepticus (CMS/HCC)  -     CBC & Differential; Future  -     Comprehensive Metabolic Panel; Future  -     Levetiracetam Level (Keppra); Future    Other orders  -     levETIRAcetam (KEPPRA) 500 MG tablet; Take 1 tablet by mouth 2 (Two) Times a Day.

## 2018-12-12 DIAGNOSIS — Z00.00 EXAMINATION, MEDICAL, GENERAL: ICD-10-CM

## 2018-12-12 LAB
ALBUMIN SERPL-MCNC: 3.7 G/DL (ref 3.5–5.2)
ALP BLD-CCNC: 96 U/L (ref 40–130)
ALT SERPL-CCNC: 30 U/L (ref 5–41)
ANION GAP SERPL CALCULATED.3IONS-SCNC: 15 MMOL/L (ref 7–19)
AST SERPL-CCNC: 25 U/L (ref 5–40)
BILIRUB SERPL-MCNC: 0.3 MG/DL (ref 0.2–1.2)
BUN BLDV-MCNC: 8 MG/DL (ref 6–20)
CALCIUM SERPL-MCNC: 8.9 MG/DL (ref 8.6–10)
CHLORIDE BLD-SCNC: 107 MMOL/L (ref 98–111)
CHOLESTEROL, TOTAL: 155 MG/DL (ref 160–199)
CO2: 24 MMOL/L (ref 22–29)
CREAT SERPL-MCNC: 0.8 MG/DL (ref 0.5–1.2)
GFR NON-AFRICAN AMERICAN: >60
GLUCOSE BLD-MCNC: 98 MG/DL (ref 74–109)
HCT VFR BLD CALC: 43.5 % (ref 42–52)
HDLC SERPL-MCNC: 67 MG/DL (ref 55–121)
HEMOGLOBIN: 13.8 G/DL (ref 14–18)
LDL CHOLESTEROL CALCULATED: 73 MG/DL
MCH RBC QN AUTO: 33.3 PG (ref 27–31)
MCHC RBC AUTO-ENTMCNC: 31.7 G/DL (ref 33–37)
MCV RBC AUTO: 104.8 FL (ref 80–94)
PDW BLD-RTO: 13.6 % (ref 11.5–14.5)
PLATELET # BLD: 231 K/UL (ref 130–400)
PMV BLD AUTO: 10.3 FL (ref 9.4–12.4)
POTASSIUM SERPL-SCNC: 4.5 MMOL/L (ref 3.5–5)
RBC # BLD: 4.15 M/UL (ref 4.7–6.1)
SODIUM BLD-SCNC: 146 MMOL/L (ref 136–145)
TOTAL PROTEIN: 6.1 G/DL (ref 6.6–8.7)
TRIGL SERPL-MCNC: 76 MG/DL (ref 0–149)
TSH SERPL DL<=0.05 MIU/L-ACNC: 7.11 UIU/ML (ref 0.27–4.2)
WBC # BLD: 6.7 K/UL (ref 4.8–10.8)

## 2018-12-14 ENCOUNTER — OFFICE VISIT (OUTPATIENT)
Dept: INTERNAL MEDICINE | Age: 48
End: 2018-12-14
Payer: COMMERCIAL

## 2018-12-14 VITALS
TEMPERATURE: 98.6 F | HEIGHT: 71 IN | DIASTOLIC BLOOD PRESSURE: 82 MMHG | SYSTOLIC BLOOD PRESSURE: 130 MMHG | HEART RATE: 60 BPM | OXYGEN SATURATION: 98 % | BODY MASS INDEX: 26.39 KG/M2 | WEIGHT: 188.5 LBS

## 2018-12-14 DIAGNOSIS — E78.2 MIXED HYPERLIPIDEMIA: ICD-10-CM

## 2018-12-14 DIAGNOSIS — E03.8 SUBCLINICAL HYPOTHYROIDISM: ICD-10-CM

## 2018-12-14 DIAGNOSIS — Z12.5 SCREENING FOR PROSTATE CANCER: ICD-10-CM

## 2018-12-14 DIAGNOSIS — I10 ESSENTIAL HYPERTENSION: ICD-10-CM

## 2018-12-14 DIAGNOSIS — K76.0 FATTY LIVER: ICD-10-CM

## 2018-12-14 DIAGNOSIS — I63.50 CEREBRAL ARTERY OCCLUSION WITH CEREBRAL INFARCTION (HCC): ICD-10-CM

## 2018-12-14 DIAGNOSIS — Z00.00 ROUTINE GENERAL MEDICAL EXAMINATION AT A HEALTH CARE FACILITY: Primary | ICD-10-CM

## 2018-12-14 PROCEDURE — 99214 OFFICE O/P EST MOD 30 MIN: CPT | Performed by: PHYSICIAN ASSISTANT

## 2018-12-14 RX ORDER — SIMVASTATIN 40 MG
40 TABLET ORAL NIGHTLY
COMMUNITY
End: 2019-09-17 | Stop reason: SDUPTHER

## 2018-12-14 RX ORDER — ASPIRIN 325 MG
325 TABLET ORAL DAILY
COMMUNITY
End: 2020-11-11

## 2018-12-14 ASSESSMENT — ENCOUNTER SYMPTOMS
BACK PAIN: 0
RHINORRHEA: 0
EYE PAIN: 0
COLOR CHANGE: 0
PHOTOPHOBIA: 0
SORE THROAT: 0
CHEST TIGHTNESS: 0
COUGH: 0
SINUS PRESSURE: 0
ABDOMINAL PAIN: 0
SHORTNESS OF BREATH: 0
VOMITING: 0
CONSTIPATION: 0
EYE REDNESS: 0
WHEEZING: 0
DIARRHEA: 0
NAUSEA: 0

## 2018-12-14 ASSESSMENT — PATIENT HEALTH QUESTIONNAIRE - PHQ9
1. LITTLE INTEREST OR PLEASURE IN DOING THINGS: 0
SUM OF ALL RESPONSES TO PHQ QUESTIONS 1-9: 0
SUM OF ALL RESPONSES TO PHQ9 QUESTIONS 1 & 2: 0
2. FEELING DOWN, DEPRESSED OR HOPELESS: 0
SUM OF ALL RESPONSES TO PHQ QUESTIONS 1-9: 0

## 2018-12-14 NOTE — PROGRESS NOTES
[facility]  [address] [address]  Dept: [phone]  Dept Fax: [phone]  Loc: [phone]      [facility] INTER[facility] MEDICINE  OFFICE NOTE      Chief Complaint   Patient presents with    Other     physical , swelling in both feet and ankles for a couple of days now        Maximilian Conrad is a 46y.o. year old male who is seen for Physical.    Patient continues to follow-up with Dr. Odalis Trevino with a history of stroke and possible seizures. Patient has not had any recent seizures. Patient denies any headaches. Patient is on Keppra 500 mg twice a day. Patient still under precautions with driving. Patient states compliant with medication. Patient was just seen by Dr. Odalis Trevino on 5 December 2018. Patient last saw Dr. Odalis Trevino his labs done 2 weeks prior and he had elevated AST of 100 and ALT of 63. His liver enzymes have returned to normal.    Patient's blood pressure seems to be stable at this time. Patient's current medication regiment seems to be adequate. Patient denies any chest pain, shortness of breath, palpitations. Patient states compliant with taking medication. Patient's currently on Diovan 80 mg daily and labetalol 100 mg every day, and enalapril 10 mg twice a day. Patient's hyperlipidemia seems to be fairly well controlled on current medication regimen continue as directed. Patient denies any side effects from lipid lowering medication. Patient states trying to properly eat and exercise as directed. Patient's taking Zocor 40 mg daily. Patient states gets some swelling in his feet and ankles last couple days. Patient states his try to cut back on salt doesn't happen all the time. Patient denies any chest pain or shortness of breath. Patient states he dislikes is some dependent edema. He will continue to monitor. Patient's TSH was mildly elevated up around 7. Patient is subclinical hypothyroidism.   We will CAPS Take 1 capsule by mouth daily Yes Historical Provider, MD   Multiple Vitamins-Minerals (THERAPEUTIC MULTIVITAMIN-MINERALS) tablet Take 1 tablet by mouth daily Yes Historical Provider, MD   fluticasone (FLONASE) 50 MCG/ACT nasal spray 50 sprays Yes Historical Provider, MD   montelukast (SINGULAIR) 10 MG tablet Take 10 mg by mouth daily Yes Historical Provider, MD   Willa, Zingiber officinalis, (WILLA ROOT) 250 MG CAPS Take 6 capsules by mouth daily  Historical Provider, MD   pregabalin (LYRICA) 75 MG capsule Take 75 mg by mouth daily  Historical Provider, MD   sulfamethoxazole-trimethoprim (BACTRIM;SEPTRA) 200-40 MG/5ML suspension Take 160 mg by mouth 2 times daily  Historical Provider, MD       Past Surgical History:   Procedure Laterality Date    CHOLECYSTECTOMY      with cholangiography 1/0//06 Dr Vaughn Boogie       History reviewed. No pertinent family history. Allergies   Allergen Reactions    Phenytoin      rash       Social History     Social History    Marital status: Single     Spouse name: N/A    Number of children: N/A    Years of education: N/A     Occupational History    Not on file. Social History Main Topics    Smoking status: Never Smoker    Smokeless tobacco: Never Used    Alcohol use No    Drug use: No    Sexual activity: Not on file     Other Topics Concern    Not on file     Social History Narrative    No narrative on file       Review of Systems  Review of Systems   Constitutional: Negative for activity change, appetite change, fatigue and unexpected weight change. HENT: Negative for congestion, ear pain, postnasal drip, rhinorrhea, sinus pressure, sneezing and sore throat. Eyes: Negative for photophobia, pain and redness. Respiratory: Negative for cough, chest tightness, shortness of breath and wheezing. Cardiovascular: Positive for leg swelling. Negative for chest pain and palpitations.    Gastrointestinal: Negative for abdominal pain, constipation, diarrhea, nausea and vomiting. Genitourinary: Negative for dysuria, frequency, hematuria and urgency. Musculoskeletal: Negative for arthralgias, back pain, gait problem, joint swelling and myalgias. Skin: Negative for color change, pallor and wound. Neurological: Negative for dizziness, speech difficulty, weakness, light-headedness, numbness and headaches. Hematological: Negative for adenopathy. Does not bruise/bleed easily. Psychiatric/Behavioral: Negative for confusion. The patient is not nervous/anxious. Current Outpatient Prescriptions   Medication Sig Dispense Refill    aspirin 325 MG tablet Take 325 mg by mouth daily      simvastatin (ZOCOR) 40 MG tablet Take 40 mg by mouth nightly      atorvastatin (LIPITOR) 20 MG tablet TAKE 1 TABLET DAILY. 30 tablet 0    levETIRAcetam (KEPPRA) 500 MG tablet Take 500 mg by mouth 2 times daily       enalapril (VASOTEC) 10 MG tablet TAKE 1 TABLET BY MOUTH TWICE A  tablet 2    labetalol (NORMODYNE) 200 MG tablet TAKE 1 TABLET BY MOUTH EVERY DAY 30 tablet 5    valsartan (DIOVAN) 80 MG tablet TAKE 1 TABLET BY MOUTH EVERY DAY 90 tablet 2    Capsicum-Garlic (CAYENNE PLUS GARLIC) 199-050 MG CAPS Take 1 capsule by mouth daily      Multiple Vitamins-Minerals (THERAPEUTIC MULTIVITAMIN-MINERALS) tablet Take 1 tablet by mouth daily      fluticasone (FLONASE) 50 MCG/ACT nasal spray 50 sprays      montelukast (SINGULAIR) 10 MG tablet Take 10 mg by mouth daily      Willa, Zingiber officinalis, (WILLA ROOT) 250 MG CAPS Take 6 capsules by mouth daily      pregabalin (LYRICA) 75 MG capsule Take 75 mg by mouth daily      sulfamethoxazole-trimethoprim (BACTRIM;SEPTRA) 200-40 MG/5ML suspension Take 160 mg by mouth 2 times daily       No current facility-administered medications for this visit.         /82   Pulse 60   Temp 98.6 °F (37 °C)   Ht 5' 11\" (1.803 m)   Wt 188 lb 8 oz (85.5 kg)   SpO2 98%   BMI 26.29 kg/m²     PHYSICAL EXAM  Physical Exam

## 2019-02-24 RX ORDER — LABETALOL 200 MG/1
TABLET, FILM COATED ORAL
Qty: 30 TABLET | Refills: 5 | Status: SHIPPED | OUTPATIENT
Start: 2019-02-24 | End: 2019-11-21 | Stop reason: SDUPTHER

## 2019-03-13 RX ORDER — ENALAPRIL MALEATE 10 MG/1
TABLET ORAL
Qty: 180 TABLET | Refills: 2 | Status: SHIPPED | OUTPATIENT
Start: 2019-03-13 | End: 2020-01-28

## 2019-09-03 RX ORDER — LEVETIRACETAM 500 MG/1
TABLET ORAL
Qty: 60 TABLET | Refills: 0 | Status: SHIPPED | OUTPATIENT
Start: 2019-09-03 | End: 2019-10-05 | Stop reason: SDUPTHER

## 2019-09-17 RX ORDER — SIMVASTATIN 40 MG
40 TABLET ORAL NIGHTLY
Qty: 30 TABLET | Refills: 2 | Status: SHIPPED | OUTPATIENT
Start: 2019-09-17 | End: 2019-12-17 | Stop reason: SDUPTHER

## 2019-10-07 RX ORDER — LEVETIRACETAM 500 MG/1
TABLET ORAL
Qty: 60 TABLET | Refills: 0 | Status: SHIPPED | OUTPATIENT
Start: 2019-10-07 | End: 2019-11-12 | Stop reason: SDUPTHER

## 2019-11-12 RX ORDER — LEVETIRACETAM 500 MG/1
TABLET ORAL
Qty: 60 TABLET | Refills: 0 | Status: SHIPPED | OUTPATIENT
Start: 2019-11-12 | End: 2019-12-06 | Stop reason: SDUPTHER

## 2019-11-21 RX ORDER — LABETALOL 200 MG/1
TABLET, FILM COATED ORAL
Qty: 30 TABLET | Refills: 5 | Status: SHIPPED | OUTPATIENT
Start: 2019-11-21 | End: 2020-06-19

## 2019-12-09 RX ORDER — SIMVASTATIN 40 MG
TABLET ORAL
Qty: 30 TABLET | Refills: 2 | OUTPATIENT
Start: 2019-12-09

## 2019-12-11 RX ORDER — LEVETIRACETAM 500 MG/1
TABLET ORAL
Qty: 60 TABLET | Refills: 0 | Status: SHIPPED | OUTPATIENT
Start: 2019-12-11 | End: 2020-01-21

## 2019-12-17 RX ORDER — SIMVASTATIN 40 MG
40 TABLET ORAL NIGHTLY
Qty: 30 TABLET | Refills: 2 | Status: SHIPPED | OUTPATIENT
Start: 2019-12-17 | End: 2020-04-08

## 2020-01-21 RX ORDER — LEVETIRACETAM 500 MG/1
TABLET ORAL
Qty: 60 TABLET | Refills: 0 | Status: SHIPPED | OUTPATIENT
Start: 2020-01-21 | End: 2020-01-23 | Stop reason: SDUPTHER

## 2020-01-22 ENCOUNTER — LAB (OUTPATIENT)
Dept: LAB | Facility: HOSPITAL | Age: 50
End: 2020-01-22

## 2020-01-22 DIAGNOSIS — R56.9 GENERALIZED CONVULSIVE SEIZURES (HCC): ICD-10-CM

## 2020-01-22 DIAGNOSIS — R56.9 GENERALIZED CONVULSIVE SEIZURES (HCC): Primary | ICD-10-CM

## 2020-01-22 LAB
ALBUMIN SERPL-MCNC: 4.3 G/DL (ref 3.5–5.2)
ALBUMIN/GLOB SERPL: 1.6 G/DL
ALP SERPL-CCNC: 122 U/L (ref 39–117)
ALT SERPL W P-5'-P-CCNC: 81 U/L (ref 1–41)
ANION GAP SERPL CALCULATED.3IONS-SCNC: 13.3 MMOL/L (ref 5–15)
AST SERPL-CCNC: 85 U/L (ref 1–40)
BASOPHILS # BLD AUTO: 0.08 10*3/MM3 (ref 0–0.2)
BASOPHILS NFR BLD AUTO: 1.2 % (ref 0–1.5)
BILIRUB SERPL-MCNC: 0.9 MG/DL (ref 0.2–1.2)
BUN BLD-MCNC: 14 MG/DL (ref 6–20)
BUN/CREAT SERPL: 17.1 (ref 7–25)
CALCIUM SPEC-SCNC: 9.8 MG/DL (ref 8.6–10.5)
CHLORIDE SERPL-SCNC: 98 MMOL/L (ref 98–107)
CO2 SERPL-SCNC: 28.7 MMOL/L (ref 22–29)
CREAT BLD-MCNC: 0.82 MG/DL (ref 0.76–1.27)
DEPRECATED RDW RBC AUTO: 47.2 FL (ref 37–54)
EOSINOPHIL # BLD AUTO: 0.31 10*3/MM3 (ref 0–0.4)
EOSINOPHIL NFR BLD AUTO: 4.8 % (ref 0.3–6.2)
ERYTHROCYTE [DISTWIDTH] IN BLOOD BY AUTOMATED COUNT: 13.3 % (ref 12.3–15.4)
GFR SERPL CREATININE-BSD FRML MDRD: 99 ML/MIN/1.73
GLOBULIN UR ELPH-MCNC: 2.7 GM/DL
GLUCOSE BLD-MCNC: 83 MG/DL (ref 65–99)
HCT VFR BLD AUTO: 35.6 % (ref 37.5–51)
HGB BLD-MCNC: 12.5 G/DL (ref 13–17.7)
IMM GRANULOCYTES # BLD AUTO: 0.05 10*3/MM3 (ref 0–0.05)
IMM GRANULOCYTES NFR BLD AUTO: 0.8 % (ref 0–0.5)
LYMPHOCYTES # BLD AUTO: 1.26 10*3/MM3 (ref 0.7–3.1)
LYMPHOCYTES NFR BLD AUTO: 19.7 % (ref 19.6–45.3)
MCH RBC QN AUTO: 33.9 PG (ref 26.6–33)
MCHC RBC AUTO-ENTMCNC: 35.1 G/DL (ref 31.5–35.7)
MCV RBC AUTO: 96.5 FL (ref 79–97)
MONOCYTES # BLD AUTO: 1.15 10*3/MM3 (ref 0.1–0.9)
MONOCYTES NFR BLD AUTO: 17.9 % (ref 5–12)
NEUTROPHILS # BLD AUTO: 3.56 10*3/MM3 (ref 1.7–7)
NEUTROPHILS NFR BLD AUTO: 55.6 % (ref 42.7–76)
NRBC BLD AUTO-RTO: 0 /100 WBC (ref 0–0.2)
PLATELET # BLD AUTO: 176 10*3/MM3 (ref 140–450)
PMV BLD AUTO: 11 FL (ref 6–12)
POTASSIUM BLD-SCNC: 3 MMOL/L (ref 3.5–5.2)
PROT SERPL-MCNC: 7 G/DL (ref 6–8.5)
RBC # BLD AUTO: 3.69 10*6/MM3 (ref 4.14–5.8)
SODIUM BLD-SCNC: 140 MMOL/L (ref 136–145)
WBC NRBC COR # BLD: 6.41 10*3/MM3 (ref 3.4–10.8)

## 2020-01-22 PROCEDURE — 36415 COLL VENOUS BLD VENIPUNCTURE: CPT

## 2020-01-22 PROCEDURE — 80053 COMPREHEN METABOLIC PANEL: CPT | Performed by: PSYCHIATRY & NEUROLOGY

## 2020-01-22 PROCEDURE — 80177 DRUG SCRN QUAN LEVETIRACETAM: CPT | Performed by: PSYCHIATRY & NEUROLOGY

## 2020-01-22 PROCEDURE — 85025 COMPLETE CBC W/AUTO DIFF WBC: CPT | Performed by: PSYCHIATRY & NEUROLOGY

## 2020-01-23 ENCOUNTER — TELEPHONE (OUTPATIENT)
Dept: NEUROLOGY | Facility: CLINIC | Age: 50
End: 2020-01-23

## 2020-01-23 ENCOUNTER — OFFICE VISIT (OUTPATIENT)
Dept: NEUROLOGY | Facility: CLINIC | Age: 50
End: 2020-01-23

## 2020-01-23 ENCOUNTER — OFFICE VISIT (OUTPATIENT)
Dept: INTERNAL MEDICINE | Age: 50
End: 2020-01-23
Payer: COMMERCIAL

## 2020-01-23 VITALS
BODY MASS INDEX: 24.92 KG/M2 | SYSTOLIC BLOOD PRESSURE: 130 MMHG | HEIGHT: 71 IN | WEIGHT: 178 LBS | RESPIRATION RATE: 18 BRPM | HEART RATE: 72 BPM | DIASTOLIC BLOOD PRESSURE: 80 MMHG

## 2020-01-23 VITALS
DIASTOLIC BLOOD PRESSURE: 80 MMHG | HEIGHT: 71 IN | BODY MASS INDEX: 24.92 KG/M2 | OXYGEN SATURATION: 98 % | HEART RATE: 72 BPM | SYSTOLIC BLOOD PRESSURE: 130 MMHG | TEMPERATURE: 98.6 F | WEIGHT: 178 LBS

## 2020-01-23 DIAGNOSIS — E87.6 HYPOKALEMIA: ICD-10-CM

## 2020-01-23 DIAGNOSIS — R56.9 GENERALIZED CONVULSIVE SEIZURES (HCC): Primary | ICD-10-CM

## 2020-01-23 PROCEDURE — 99214 OFFICE O/P EST MOD 30 MIN: CPT | Performed by: PSYCHIATRY & NEUROLOGY

## 2020-01-23 PROCEDURE — 99214 OFFICE O/P EST MOD 30 MIN: CPT | Performed by: PHYSICIAN ASSISTANT

## 2020-01-23 RX ORDER — LANOLIN ALCOHOL/MO/W.PET/CERES
400 CREAM (GRAM) TOPICAL DAILY
COMMUNITY
End: 2022-01-21 | Stop reason: ALTCHOICE

## 2020-01-23 RX ORDER — LEVETIRACETAM 500 MG/1
500 TABLET ORAL 2 TIMES DAILY
Qty: 60 TABLET | Refills: 5 | Status: SHIPPED | OUTPATIENT
Start: 2020-01-23 | End: 2020-07-27

## 2020-01-23 RX ORDER — GLUC/MSM/COLGN2/HYAL/ANTIARTH3 375-375-20
TABLET ORAL
COMMUNITY
End: 2022-09-15 | Stop reason: SINTOL

## 2020-01-23 RX ORDER — POTASSIUM CHLORIDE 20 MEQ/1
20 TABLET, EXTENDED RELEASE ORAL DAILY
Qty: 7 TABLET | Refills: 0 | Status: SHIPPED | OUTPATIENT
Start: 2020-01-23 | End: 2020-11-12

## 2020-01-23 RX ORDER — LANOLIN ALCOHOL/MO/W.PET/CERES
400 CREAM (GRAM) TOPICAL DAILY
COMMUNITY
End: 2020-11-11

## 2020-01-23 ASSESSMENT — PATIENT HEALTH QUESTIONNAIRE - PHQ9
SUM OF ALL RESPONSES TO PHQ QUESTIONS 1-9: 0
SUM OF ALL RESPONSES TO PHQ QUESTIONS 1-9: 0
2. FEELING DOWN, DEPRESSED OR HOPELESS: 0
SUM OF ALL RESPONSES TO PHQ9 QUESTIONS 1 & 2: 0
1. LITTLE INTEREST OR PLEASURE IN DOING THINGS: 0

## 2020-01-23 ASSESSMENT — ENCOUNTER SYMPTOMS
EYE PAIN: 0
NAUSEA: 0
DIARRHEA: 0
COLOR CHANGE: 0
COUGH: 0
PHOTOPHOBIA: 0
RHINORRHEA: 0
SHORTNESS OF BREATH: 0
WHEEZING: 0
CONSTIPATION: 0
EYE REDNESS: 0
VOMITING: 0
SINUS PRESSURE: 0
ABDOMINAL PAIN: 0
BACK PAIN: 0
SORE THROAT: 0
CHEST TIGHTNESS: 0

## 2020-01-23 NOTE — TELEPHONE ENCOUNTER
I contacted DAVID Siddiqui for this patient at Dr. Karimi request.  I did ask if patient could be seen today due to his abnormal potassium level.  They will see him at 3pm.  This patient has been notified.  I have faxed his lab reports to [phone].  I also gave him a copy of the labs to take with him.

## 2020-01-23 NOTE — PROGRESS NOTES
[facility]  [address] [facility] [address] [address]  Dept: [phone]  Dept Fax: [phone]  Loc: [phone]      [facility] INTERNAL MEDICINE  OFFICE NOTE      Chief Complaint   Patient presents with    Other     Dr. Adeline ramirez pt labs addressed       Brte Ching is a 44y.o. year old male who is seen for follow-up from Dr. Miri Bay office. Patient's hemoglobin hematocrit had dropped compared to a year ago. And patient's potassium was at 3. Patient states he does not really feel bad he feels fine. Patient states did have some nausea and vomiting diarrhea last week and that is why he thinks that his potassium is went down some because he was not eaten very much. Patient denies any blood in the stool or dark tarry stools. Patient is due to have a colonoscopy so we will get that scheduled. Patient denies any abnormal bleeding or cuts or recent blood draws. Patient's not running any kind of fever. Patient's not really having any muscle cramps. Patient also had some elevated liver enzymes. Patient states this is been high for a while states him and Dr. Beni Martinez determined it was probably the cholesterol medication. We will get a reduce the cholesterol medication and see if the liver enzymes will come down. Patient has been lost to follow-up for the last year. I will start patient on a little bit of potassium for about a week patient has been eat some bananas will try to get his potassium up the patient is asymptomatic patient's not have any chest pain or shortness of breath. Patient does have a history of CVA and is on Keppra. Patient has never had any seizures. We will recheck patient's labs in a couple weeks. Or sooner as needed if patient starts having any kind of symptoms fatigue muscle cramps chest pain, shortness of breath. Patient denies any further nausea vomiting or diarrhea. Patient is eating drinking normally again.     Active Ambulatory Problems     Diagnosis Date Noted    Mixed hyperlipidemia     Hypertension     Fatty liver     Asthma     Cerebral artery occlusion with cerebral infarction [facility])      Resolved Ambulatory Problems     Diagnosis Date Noted    No Resolved Ambulatory Problems     Past Medical History:   Diagnosis Date    Hemorrhage, intracerebral ([facility] [address]        Past Medical History:   Diagnosis Date    Asthma     Cerebral artery occlusion with cerebral infarction ([facility] [address]     Fatty liver     Hemorrhage, intracerebral (HCC)     history of hemorrhage to the brain, resolved. right hemiparesis.  Hypertension     Mixed hyperlipidemia        Prior to Visit Medications    Medication Sig Taking?  Authorizing Provider   folic acid (FOLVITE) 947 MCG tablet Take 400 mcg by mouth daily Yes Historical Provider, MD   potassium chloride (KLOR-CON M) 20 MEQ extended release tablet Take 1 tablet by mouth daily Yes EDIE Amado   simvastatin (ZOCOR) 40 MG tablet Take 1 tablet by mouth nightly Yes EDIE Amado   labetalol (NORMODYNE) 200 MG tablet TAKE 1 TABLET BY MOUTH EVERY DAY Yes EDIE Amado   enalapril (VASOTEC) 10 MG tablet TAKE 1 TABLET BY MOUTH TWICE A DAY Yes EDIE Amado   aspirin 325 MG tablet Take 325 mg by mouth daily Yes Historical Provider, MD   levETIRAcetam (KEPPRA) 500 MG tablet Take 500 mg by mouth 2 times daily  Yes Historical Provider, MD   Capsicum-Garlic (CAYENNE PLUS GARLIC) 004-842 MG CAPS Take 1 capsule by mouth daily Yes Historical Provider, MD Crouch, Zingiber officinalis, (KAYLIN ROOT) 250 MG CAPS Take 6 capsules by mouth daily Yes Historical Provider, MD   Multiple Vitamins-Minerals (THERAPEUTIC MULTIVITAMIN-MINERALS) tablet Take 1 tablet by mouth daily Yes Historical Provider, MD   fluticasone (FLONASE) 50 MCG/ACT nasal spray 50 sprays Yes Historical Provider, MD   montelukast (SINGULAIR) 10 MG tablet Take 10 mg by mouth daily Yes Historical Provider, MD Regalado

## 2020-01-23 NOTE — PATIENT INSTRUCTIONS
Patient to continue driving and seizure precautions as previously discussed.  Patient not to be climbing or using sharp cutting tools.  Patient to not be taking baths but rather showers and not be working over hot fire/stove/grill/water.  Patient not get into hot tub or swim by self.  Patient not to be working at heights.  Patient to get to emergency room immediately with stroke symptoms or seizures

## 2020-01-23 NOTE — PROGRESS NOTES
Subjective   Nathaniel Troy, 1970, is a male who is being seen today for   Chief Complaint   Patient presents with   • Stroke       HISTORY OF PRESENT ILLNESS: Patient extended follow-up.  Patient has had blood work done which showed potassium at 3.0 when elevation of liver enzymes and anemia.  Patient is to see PCP today about these findings.  Patient  not had any seizures or headaches.  Patient is taking his Keppra 500 mg 1 p.o. twice daily and Keppra level still pending.  Patient takes multiple over-the-counter medications including sport vitamins and is advised about reviewing these with the PCP as to whether to continue.  Patient does take Lipitor.  Patient has not had any stroke symptoms    REVIEW OF SYSTEMS:   GENERAL: Patient's blood pressure today 130/80 left arm seated in same standing with pulse 72.  PULMONARY: No acute respiratory difficulty  CVS: No acute chest pain or palpitation  GASTROINTESTINAL: No acute GI distress except for the episode last week of nausea and vomiting and diarrhea for 24 hours with either food poisoning or a bug  GENITOURINARY: No acute  distress  GYN: Not applicable  MUSCULOSKELETAL: No acute musculoskeletal symptoms but has history of tremor which she says gets worse when stress or drinks too much coffee.  HEENT: No acute vision or hearing change  ENDOCRINE: No acute endocrine symptoms/not diabetic  PSYCHIATRIC: No acute psychiatric symptoms  HEMATOLOGY: As above  SKIN: No acute skin changes  Family history reviewed and otherwise noncontributory and there is no family history of tremor  Social history: Patient denies smoking or drug or alcohol use    PHYSICAL EXAMINATION:    GENERAL: Patient does have intentional tremor bilaterally without cogwheeling or rigidity  CRANIUM: Normocephalic/atraumatic  HEENT:       EYES: No acute fundic abnormalities.  Pupils equal round reactive to light.  EOMs intact without nystagmus and fields full to confrontation       EARS: Tympanic  membranes normal and hears tuning fork bilaterally       THROAT: No oropharynx abnormalities       NECK: No bruits/no lymphadenopathy  CHEST: No acute cardiopulmonary abnormalities by auscultation  ABDOMEN: Nondistended  EXTREMITIES: Dorsalis pedis pulse symmetrical  NEURO: Patient alert and follows commands without difficulty  SPEECH: Normal    CRANIAL NERVES: Motor/sensory about the face normal and symmetric    MOTOR STRENGTH: Motor strength upper and lower extremities normal  STATION AND GAIT: Gait normal/Romberg negative  CEREBELLAR: Finger-nose and heel-to-shin normal  SENSORY: Slight decrease in vibration in the toes bilaterally otherwise normal pin and vibration throughout  REFLEXES: Reflexes are somewhat hyperactive throughout upper and lower extremity without clonus or Babinski      ASSESSMENT AND PLAN: Patient with history of seizures and hemorrhagic stroke.  Patient to continue Keppra the same and awaiting Keppra level.  Patient to see PCP regarding blood work abnormalities.  I spent 25 minutes with this patient with 15 minutes counseling. Patient's Body mass index is 24.83 kg/m². BMI is within normal parameters. No follow-up required..      Nathaniel was seen today for stroke.    Diagnoses and all orders for this visit:    Generalized convulsive seizures (CMS/HCC)    Hypokalemia    Other orders  -     levETIRAcetam (KEPPRA) 500 MG tablet; Take 1 tablet by mouth 2 (Two) Times a Day.        Dictated utilizing Dragon voice recognition software

## 2020-01-24 LAB — LEVETIRACETAM SERPL-MCNC: 18.9 UG/ML (ref 10–40)

## 2020-01-28 RX ORDER — ENALAPRIL MALEATE 10 MG/1
TABLET ORAL
Qty: 60 TABLET | Refills: 8 | Status: SHIPPED | OUTPATIENT
Start: 2020-01-28 | End: 2020-11-11 | Stop reason: SDUPTHER

## 2020-04-08 RX ORDER — SIMVASTATIN 40 MG
TABLET ORAL
Qty: 30 TABLET | Refills: 2 | Status: SHIPPED | OUTPATIENT
Start: 2020-04-08 | End: 2020-11-11 | Stop reason: SDUPTHER

## 2020-06-19 RX ORDER — LABETALOL 200 MG/1
TABLET, FILM COATED ORAL
Qty: 90 TABLET | Refills: 1 | Status: SHIPPED | OUTPATIENT
Start: 2020-06-19 | End: 2020-11-11 | Stop reason: SDUPTHER

## 2020-06-19 NOTE — TELEPHONE ENCOUNTER
Kishore called requesting a refill of the below medication which has been pended for you:     Requested Prescriptions     Pending Prescriptions Disp Refills    labetalol (NORMODYNE) 200 MG tablet [Pharmacy Med Name: LABETALOL  MG TABLET] 90 tablet 1     Sig: TAKE 1 TABLET BY MOUTH EVERY DAY       Last Appointment Date: 1/23/2020  Next Appointment Date: Visit date not found    Allergies   Allergen Reactions    Phenytoin      rash

## 2020-07-26 DIAGNOSIS — R56.9 GENERALIZED CONVULSIVE SEIZURES (HCC): Primary | ICD-10-CM

## 2020-07-27 RX ORDER — LEVETIRACETAM 500 MG/1
TABLET ORAL
Qty: 180 TABLET | Refills: 0 | Status: SHIPPED | OUTPATIENT
Start: 2020-07-27 | End: 2020-10-20 | Stop reason: SDUPTHER

## 2020-10-14 ENCOUNTER — TELEPHONE (OUTPATIENT)
Dept: NEUROLOGY | Facility: CLINIC | Age: 50
End: 2020-10-14

## 2020-10-14 NOTE — TELEPHONE ENCOUNTER
Patient is asking if his appt can be a video visit on 10/20/20 with dr Karimi    Please call patient [phone]

## 2020-10-15 ENCOUNTER — TELEPHONE (OUTPATIENT)
Dept: NEUROLOGY | Facility: CLINIC | Age: 50
End: 2020-10-15

## 2020-10-15 NOTE — TELEPHONE ENCOUNTER
----- Message from Edis Karimi MD sent at 10/15/2020 11:17 AM CDT -----  Okay to have a video visit  ----- Message -----  From: Asiya Danielle LPN  Sent: 10/15/2020   9:16 AM CDT  To: Edis Karimi MD    Received a message from the call center, Nathaniel would like a video visit for his appointment.

## 2020-10-20 ENCOUNTER — TELEMEDICINE (OUTPATIENT)
Dept: NEUROLOGY | Facility: CLINIC | Age: 50
End: 2020-10-20

## 2020-10-20 DIAGNOSIS — R56.9 GENERALIZED CONVULSIVE SEIZURES (HCC): ICD-10-CM

## 2020-10-20 DIAGNOSIS — E78.5 HYPERLIPIDEMIA, UNSPECIFIED HYPERLIPIDEMIA TYPE: Primary | ICD-10-CM

## 2020-10-20 PROCEDURE — 99422 OL DIG E/M SVC 11-20 MIN: CPT | Performed by: PSYCHIATRY & NEUROLOGY

## 2020-10-20 RX ORDER — LEVETIRACETAM 500 MG/1
500 TABLET ORAL 2 TIMES DAILY
Qty: 180 TABLET | Refills: 1 | Status: SHIPPED | OUTPATIENT
Start: 2020-10-20 | End: 2021-05-17

## 2020-10-20 NOTE — PROGRESS NOTES
Patient  had a 15-minute video [employer] teleconference.  Patient has had no seizures.  Patient had abnormal blood work with elevated liver enzymes as he has had previously and low potassium.  Patient has not visited with his PCP recently and has not had any further blood work and is to get fasting blood work as soon as possible.  Patient to continue seizure and driving precautions.  Patient has not had any other medical problems since last seen..  ALT is 81 and AST 85 potassium is 3.0 and last CBC show borderline anemia.  Patient continues to Keppra 500 mg p.o. twice daily.  Patient to get to emergency room immediately if seizure occurs.  Final diagnoses generalized seizures

## 2020-10-20 NOTE — PATIENT INSTRUCTIONS
Patient to get with PCP about abnormalities in blood work.  Patient to get to emergency room immediately if stroke symptoms occur.  Patient to continue driving and seizure precautions.  Patient did not be climbing or using sharp cutting tools and to take showers not baths.  Patient not to get in hot tub or swim by self.  Patient to have caution working around hot fire/stove/grill/water.

## 2020-10-26 ENCOUNTER — LAB (OUTPATIENT)
Dept: LAB | Facility: HOSPITAL | Age: 50
End: 2020-10-26

## 2020-10-26 DIAGNOSIS — R56.9 GENERALIZED CONVULSIVE SEIZURES (HCC): ICD-10-CM

## 2020-10-26 DIAGNOSIS — E78.5 HYPERLIPIDEMIA, UNSPECIFIED HYPERLIPIDEMIA TYPE: ICD-10-CM

## 2020-10-26 LAB
ALBUMIN SERPL-MCNC: 4.1 G/DL (ref 3.5–5.2)
ALBUMIN/GLOB SERPL: 1.8 G/DL
ALP SERPL-CCNC: 83 U/L (ref 39–117)
ALT SERPL W P-5'-P-CCNC: 19 U/L (ref 1–41)
ANION GAP SERPL CALCULATED.3IONS-SCNC: 8.2 MMOL/L (ref 5–15)
AST SERPL-CCNC: 30 U/L (ref 1–40)
BASOPHILS # BLD AUTO: 0.1 10*3/MM3 (ref 0–0.2)
BASOPHILS NFR BLD AUTO: 1.4 % (ref 0–1.5)
BILIRUB SERPL-MCNC: 0.4 MG/DL (ref 0–1.2)
BUN SERPL-MCNC: 17 MG/DL (ref 6–20)
BUN/CREAT SERPL: 18.1 (ref 7–25)
CALCIUM SPEC-SCNC: 9.3 MG/DL (ref 8.6–10.5)
CHLORIDE SERPL-SCNC: 104 MMOL/L (ref 98–107)
CHOLEST SERPL-MCNC: 226 MG/DL (ref 0–200)
CO2 SERPL-SCNC: 28.8 MMOL/L (ref 22–29)
CREAT SERPL-MCNC: 0.94 MG/DL (ref 0.76–1.27)
DEPRECATED RDW RBC AUTO: 43 FL (ref 37–54)
EOSINOPHIL # BLD AUTO: 1.15 10*3/MM3 (ref 0–0.4)
EOSINOPHIL NFR BLD AUTO: 16.1 % (ref 0.3–6.2)
ERYTHROCYTE [DISTWIDTH] IN BLOOD BY AUTOMATED COUNT: 12 % (ref 12.3–15.4)
GFR SERPL CREATININE-BSD FRML MDRD: 84 ML/MIN/1.73
GLOBULIN UR ELPH-MCNC: 2.3 GM/DL
GLUCOSE SERPL-MCNC: 91 MG/DL (ref 65–99)
HCT VFR BLD AUTO: 43.3 % (ref 37.5–51)
HDLC SERPL-MCNC: 41 MG/DL (ref 40–60)
HGB BLD-MCNC: 14.8 G/DL (ref 13–17.7)
IMM GRANULOCYTES # BLD AUTO: 0.01 10*3/MM3 (ref 0–0.05)
IMM GRANULOCYTES NFR BLD AUTO: 0.1 % (ref 0–0.5)
LDLC SERPL CALC-MCNC: 150 MG/DL (ref 0–100)
LDLC/HDLC SERPL: 3.58 {RATIO}
LYMPHOCYTES # BLD AUTO: 1.92 10*3/MM3 (ref 0.7–3.1)
LYMPHOCYTES NFR BLD AUTO: 26.9 % (ref 19.6–45.3)
MCH RBC QN AUTO: 33 PG (ref 26.6–33)
MCHC RBC AUTO-ENTMCNC: 34.2 G/DL (ref 31.5–35.7)
MCV RBC AUTO: 96.4 FL (ref 79–97)
MONOCYTES # BLD AUTO: 0.7 10*3/MM3 (ref 0.1–0.9)
MONOCYTES NFR BLD AUTO: 9.8 % (ref 5–12)
NEUTROPHILS NFR BLD AUTO: 3.25 10*3/MM3 (ref 1.7–7)
NEUTROPHILS NFR BLD AUTO: 45.7 % (ref 42.7–76)
NRBC BLD AUTO-RTO: 0 /100 WBC (ref 0–0.2)
PLATELET # BLD AUTO: 204 10*3/MM3 (ref 140–450)
PMV BLD AUTO: 11.2 FL (ref 6–12)
POTASSIUM SERPL-SCNC: 4.5 MMOL/L (ref 3.5–5.2)
PROT SERPL-MCNC: 6.4 G/DL (ref 6–8.5)
RBC # BLD AUTO: 4.49 10*6/MM3 (ref 4.14–5.8)
SODIUM SERPL-SCNC: 141 MMOL/L (ref 136–145)
TRIGL SERPL-MCNC: 191 MG/DL (ref 0–150)
VLDLC SERPL-MCNC: 35 MG/DL (ref 5–40)
WBC # BLD AUTO: 7.13 10*3/MM3 (ref 3.4–10.8)

## 2020-10-26 PROCEDURE — 80177 DRUG SCRN QUAN LEVETIRACETAM: CPT | Performed by: PSYCHIATRY & NEUROLOGY

## 2020-10-26 PROCEDURE — 80053 COMPREHEN METABOLIC PANEL: CPT | Performed by: PSYCHIATRY & NEUROLOGY

## 2020-10-26 PROCEDURE — 36415 COLL VENOUS BLD VENIPUNCTURE: CPT

## 2020-10-26 PROCEDURE — 85025 COMPLETE CBC W/AUTO DIFF WBC: CPT | Performed by: PSYCHIATRY & NEUROLOGY

## 2020-10-26 PROCEDURE — 80061 LIPID PANEL: CPT | Performed by: PSYCHIATRY & NEUROLOGY

## 2020-10-28 LAB — LEVETIRACETAM SERPL-MCNC: 7.1 UG/ML (ref 10–40)

## 2020-10-29 DIAGNOSIS — R56.9 GENERALIZED CONVULSIVE SEIZURES (HCC): Primary | ICD-10-CM

## 2020-11-11 ENCOUNTER — TELEMEDICINE (OUTPATIENT)
Dept: INTERNAL MEDICINE | Age: 50
End: 2020-11-11

## 2020-11-11 ENCOUNTER — E-VISIT (OUTPATIENT)
Dept: INTERNAL MEDICINE | Age: 50
End: 2020-11-11
Payer: COMMERCIAL

## 2020-11-11 PROCEDURE — 99214 OFFICE O/P EST MOD 30 MIN: CPT | Performed by: PHYSICIAN ASSISTANT

## 2020-11-11 RX ORDER — LABETALOL 200 MG/1
TABLET, FILM COATED ORAL
Qty: 90 TABLET | Refills: 3 | Status: SHIPPED | OUTPATIENT
Start: 2020-11-11 | End: 2021-08-26 | Stop reason: SDUPTHER

## 2020-11-11 RX ORDER — ENALAPRIL MALEATE 10 MG/1
TABLET ORAL
Qty: 180 TABLET | Refills: 3 | Status: SHIPPED | OUTPATIENT
Start: 2020-11-11 | End: 2021-09-01 | Stop reason: SDUPTHER

## 2020-11-11 RX ORDER — SIMVASTATIN 40 MG
TABLET ORAL
Qty: 90 TABLET | Refills: 3 | Status: SHIPPED | OUTPATIENT
Start: 2020-11-11 | End: 2021-09-01

## 2020-11-11 NOTE — PROGRESS NOTES
Systems   Constitutional: Negative for activity change, appetite change, fatigue and unexpected weight change. HENT: Negative for ear pain, postnasal drip, rhinorrhea, sinus pressure, sneezing and sore throat. Eyes: Negative for photophobia, pain and redness. Respiratory: Negative for cough, chest tightness, shortness of breath and wheezing. Cardiovascular: Negative for chest pain, palpitations and leg swelling. Gastrointestinal: Negative for abdominal pain, constipation, diarrhea, nausea and vomiting. Genitourinary: Negative for dysuria, frequency, hematuria and urgency. Musculoskeletal: Negative for arthralgias, back pain, gait problem, joint swelling and myalgias. Skin: Negative for color change, pallor and wound. Neurological: Negative for dizziness, speech difficulty, weakness, light-headedness, numbness and headaches. Hematological: Negative for adenopathy. Does not bruise/bleed easily. Psychiatric/Behavioral: Negative for confusion. The patient is not nervous/anxious. Prior to Visit Medications    Medication Sig Taking?  Authorizing Provider   enalapril (VASOTEC) 10 MG tablet TAKE 1 TABLET BY MOUTH TWICE A DAY  EDIE Archer   labetalol (NORMODYNE) 200 MG tablet TAKE 1 TABLET BY MOUTH EVERY DAY  EDIE Archer   simvastatin (ZOCOR) 40 MG tablet TAKE 1 TABLET BY MOUTH EVERY DAY AT NIGHT  EDIE Archer   potassium chloride (KLOR-CON M) 20 MEQ extended release tablet Take 1 tablet by mouth daily  EDIE Archer   levETIRAcetam (KEPPRA) 500 MG tablet Take 500 mg by mouth 2 times daily   Historical Provider, MD   Capsicum-Garlic (CAYENNE PLUS GARLIC) 209-835 MG CAPS Take 1 capsule by mouth daily  Historical Provider, MD   Multiple Vitamins-Minerals (THERAPEUTIC MULTIVITAMIN-MINERALS) tablet Take 1 tablet by mouth daily  Historical Provider, MD   fluticasone (FLONASE) 50 MCG/ACT nasal spray 50 sprays  Historical Provider, MD   montelukast (SINGULAIR) 10 MG tablet Take 10 mg by mouth daily  Historical Provider, MD       Social History     Tobacco Use    Smoking status: Never Smoker    Smokeless tobacco: Never Used   Substance Use Topics    Alcohol use: No    Drug use: No            PHYSICAL EXAMINATION:  [ INSTRUCTIONS:  \"[x]\" Indicates a positive item  \"[]\" Indicates a negative item  -- DELETE ALL ITEMS NOT EXAMINED]  Vital Signs: (As obtained by patient/caregiver or practitioner observation)    Blood pressure-  Heart rate-    Respiratory rate-    Temperature-  Pulse oximetry-     Constitutional: [x] Appears well-developed and well-nourished [x] No apparent distress      [] Abnormal-   Mental status  [x] Alert and awake  [] Oriented to person/place/time [x]Able to follow commands      Eyes:  EOM    [x]  Normal  [] Abnormal-  Sclera  [x]  Normal  [] Abnormal -         Discharge [x]  None visible  [] Abnormal -    HENT:   [x] Normocephalic, atraumatic. [] Abnormal   [] Mouth/Throat: Mucous membranes are moist.     External Ears [x] Normal  [] Abnormal-     Neck: [x] No visualized mass     Pulmonary/Chest: [x] Respiratory effort normal.  [x] No visualized signs of difficulty breathing or respiratory distress        [] Abnormal-      Musculoskeletal:   [x] Normal gait with no signs of ataxia         [x] Normal range of motion of neck        [] Abnormal-       Neurological:        [x] No Facial Asymmetry (Cranial nerve 7 motor function) (limited exam to video visit)          [x] No gaze palsy        [] Abnormal-         Skin:        [x] No significant exanthematous lesions or discoloration noted on facial skin         [] Abnormal-            Psychiatric:       [x] Normal Affect [] No Hallucinations        [] Abnormal-     Other pertinent observable physical exam findings-     ASSESSMENT/PLAN:  1. Routine general medical examination at a health care facility  Reviewed patient's labs that were done over [facility].  Patient seems to be doing okay overall.   Patient states when he comes in to get his PSA drawn he will get his flu shot. 2. History of seizures  Patient will continue to follow-up with Dr. Renetta Gruber as directed. - Levetiracetam Level; Future    3. Screening for prostate cancer  Patient can get his PSA done next week. We will call him with any abnormal results. - PSA Screening; Future    4. Mixed hyperlipidemia  Patient's cholesterol was a little high we can increase patient's Zocor up to 40 mg daily. We will see patient back in approximately 4 months. 5. Essential hypertension  Patient's blood pressure seems fairly well controlled on current medication regimen continue as directed. 6. Allergy, sequela  Patient will continue the Flonase and Singulair as needed for allergies. Patient will follow back up with us in approximately 4 months. Patient follow-up sooner as needed. Patient will follow up in 4 months. Lee Aldana is a 44 y.o. male being evaluated by a Virtual Visit (video visit) encounter to address concerns as mentioned above. A caregiver was present when appropriate. Due to this being a TeleHealth encounter (During VOLGO-32 public health emergency), evaluation of the following organ systems was limited: Vitals/Constitutional/EENT/Resp/CV/GI//MS/Neuro/Skin/Heme-Lymph-Imm. Pursuant to the emergency declaration under the [address] [place] [address] and the [employer] and [employer] Act, this Virtual Visit was conducted with patient's (and/or legal guardian's) consent, to reduce the patient's risk of exposure to COVID-19 and provide necessary medical care. The patient (and/or legal guardian) has also been advised to contact this office for worsening conditions or problems, and seek emergency medical treatment and/or call 911 if deemed necessary.      Patient identification was verified at the start of the visit: Yes    Total time spent on this encounter: Not billed by

## 2020-11-12 NOTE — PROGRESS NOTES
Constitutional: Negative for activity change, appetite change, fatigue and unexpected weight change. HENT: Negative for ear pain, postnasal drip, rhinorrhea, sinus pressure, sneezing and sore throat. Eyes: Negative for photophobia, pain and redness. Respiratory: Negative for cough, chest tightness, shortness of breath and wheezing. Cardiovascular: Negative for chest pain, palpitations and leg swelling. Gastrointestinal: Negative for abdominal pain, constipation, diarrhea, nausea and vomiting. Genitourinary: Negative for dysuria, frequency, hematuria and urgency. Musculoskeletal: Negative for arthralgias, back pain, gait problem, joint swelling and myalgias. Skin: Negative for color change, pallor and wound. Neurological: Negative for dizziness, speech difficulty, weakness, light-headedness, numbness and headaches. Hematological: Negative for adenopathy. Does not bruise/bleed easily. Psychiatric/Behavioral: Negative for confusion. The patient is not nervous/anxious. Prior to Visit Medications    Medication Sig Taking?  Authorizing Provider   enalapril (VASOTEC) 10 MG tablet TAKE 1 TABLET BY MOUTH TWICE A DAY  EDIE Archer   labetalol (NORMODYNE) 200 MG tablet TAKE 1 TABLET BY MOUTH EVERY DAY  EDIE Archer   simvastatin (ZOCOR) 40 MG tablet TAKE 1 TABLET BY MOUTH EVERY DAY AT NIGHT  EDIE Archer   potassium chloride (KLOR-CON M) 20 MEQ extended release tablet Take 1 tablet by mouth daily  EDIE Archer   levETIRAcetam (KEPPRA) 500 MG tablet Take 500 mg by mouth 2 times daily   Historical Provider, MD   Capsicum-Garlic (CAYENNE PLUS GARLIC) 090-338 MG CAPS Take 1 capsule by mouth daily  Historical Provider, MD   Multiple Vitamins-Minerals (THERAPEUTIC MULTIVITAMIN-MINERALS) tablet Take 1 tablet by mouth daily  Historical Provider, MD   fluticasone (FLONASE) 50 MCG/ACT nasal spray 50 sprays  Historical Provider, MD   montelukast (SINGULAIR) 10 MG tablet Take 10 mg by mouth daily  Historical Provider, MD       Social History     Tobacco Use    Smoking status: Never Smoker    Smokeless tobacco: Never Used   Substance Use Topics    Alcohol use: No    Drug use: No            PHYSICAL EXAMINATION:  [ INSTRUCTIONS:  \"[x]\" Indicates a positive item  \"[]\" Indicates a negative item  -- DELETE ALL ITEMS NOT EXAMINED]  Vital Signs: (As obtained by patient/caregiver or practitioner observation)    Blood pressure-  Heart rate-    Respiratory rate-    Temperature-  Pulse oximetry-     Constitutional: [x] Appears well-developed and well-nourished [x] No apparent distress      [] Abnormal-   Mental status  [x] Alert and awake  [] Oriented to person/place/time [x]Able to follow commands      Eyes:  EOM    [x]  Normal  [] Abnormal-  Sclera  [x]  Normal  [] Abnormal -         Discharge [x]  None visible  [] Abnormal -    HENT:   [x] Normocephalic, atraumatic. [] Abnormal   [] Mouth/Throat: Mucous membranes are moist.     External Ears [x] Normal  [] Abnormal-     Neck: [x] No visualized mass     Pulmonary/Chest: [x] Respiratory effort normal.  [x] No visualized signs of difficulty breathing or respiratory distress        [] Abnormal-      Musculoskeletal:   [x] Normal gait with no signs of ataxia         [x] Normal range of motion of neck        [] Abnormal-       Neurological:        [x] No Facial Asymmetry (Cranial nerve 7 motor function) (limited exam to video visit)          [x] No gaze palsy        [] Abnormal-         Skin:        [x] No significant exanthematous lesions or discoloration noted on facial skin         [] Abnormal-            Psychiatric:       [x] Normal Affect [] No Hallucinations        [] Abnormal-     Other pertinent observable physical exam findings-     ASSESSMENT/PLAN:  1. Routine general medical examination at a health care facility  Reviewed patient's labs that were done over [facility].  Patient seems to be doing okay overall.   Patient states when he comes in to get his PSA drawn he will get his flu shot. 2. History of seizures  Patient will continue to follow-up with Dr. Alfonzo Sanchez as directed. - Levetiracetam Level; Future    3. Screening for prostate cancer  Patient can get his PSA done next week. We will call him with any abnormal results. - PSA Screening; Future    4. Mixed hyperlipidemia  Patient's cholesterol was a little high we can increase patient's Zocor up to 40 mg daily. We will see patient back in approximately 4 months. 5. Essential hypertension  Patient's blood pressure seems fairly well controlled on current medication regimen continue as directed. 6. Allergy, sequela  Patient will continue the Flonase and Singulair as needed for allergies. Patient will follow back up with us in approximately 4 months. Patient follow-up sooner as needed. Patient will follow up in 4 months. Arlen Salcido is a 44 y.o. male being evaluated by a Virtual Visit (video visit) encounter to address concerns as mentioned above. A caregiver was present when appropriate. Due to this being a TeleHealth encounter (During LKRAP-19 public health emergency), evaluation of the following organ systems was limited: Vitals/Constitutional/EENT/Resp/CV/GI//MS/Neuro/Skin/Heme-Lymph-Imm. Pursuant to the emergency declaration under the [address] [place] [address] and the [employer] and [employer] Act, this Virtual Visit was conducted with patient's (and/or legal guardian's) consent, to reduce the patient's risk of exposure to COVID-19 and provide necessary medical care. The patient (and/or legal guardian) has also been advised to contact this office for worsening conditions or problems, and seek emergency medical treatment and/or call 911 if deemed necessary.      Patient identification was verified at the start of the visit: Yes    Total time spent on this encounter: Not billed by time    Services were provided through a video synchronous discussion virtually to substitute for in-person clinic visit. Patient and provider were located at their individual homes. --EDIE Osei on 11/12/2020 at 7:57 AM    An electronic signature was used to authenticate this note.      >>> See note that was done in my chart visit we did a video call in my chart.

## 2020-11-17 ENCOUNTER — LAB (OUTPATIENT)
Dept: LAB | Facility: HOSPITAL | Age: 50
End: 2020-11-17

## 2020-11-17 ENCOUNTER — TELEPHONE (OUTPATIENT)
Dept: NEUROLOGY | Facility: CLINIC | Age: 50
End: 2020-11-17

## 2020-11-17 DIAGNOSIS — R56.9 GENERALIZED CONVULSIVE SEIZURES (HCC): ICD-10-CM

## 2020-11-17 DIAGNOSIS — Z87.898 HISTORY OF SEIZURES: ICD-10-CM

## 2020-11-17 DIAGNOSIS — Z12.5 SCREENING FOR PROSTATE CANCER: ICD-10-CM

## 2020-11-17 PROCEDURE — 36415 COLL VENOUS BLD VENIPUNCTURE: CPT

## 2020-11-17 PROCEDURE — 80177 DRUG SCRN QUAN LEVETIRACETAM: CPT

## 2020-11-17 NOTE — TELEPHONE ENCOUNTER
Nathaniel notified the order is in his chart, he can get the level done in out patient lab.  He said he also needs a PSA done and may have the lab done at [facility].

## 2020-11-17 NOTE — TELEPHONE ENCOUNTER
PT CALLED IN STATING HE IS WANTING TO  GET HIS KEPPRA LABS DRAWN AT [facility] TODAY. STATES DR RUBI WANTED HIM TO HAVE LABS DRAWN THIS WEEK. PT IS IN [place] NOW DUE TO SEEING HIM PRIMARY CARE TODAY AND WOULD LIKE TO COME STRAIGHT TO [facility] TO GET LABS.     PLEASE ADVISE    [phone]

## 2020-11-18 ENCOUNTER — NURSE ONLY (OUTPATIENT)
Dept: PRIMARY CARE CLINIC | Age: 50
End: 2020-11-18
Payer: COMMERCIAL

## 2020-11-18 LAB — PROSTATE SPECIFIC ANTIGEN: 0.37 NG/ML (ref 0–4)

## 2020-11-18 PROCEDURE — 90686 IIV4 VACC NO PRSV 0.5 ML IM: CPT | Performed by: PHYSICIAN ASSISTANT

## 2020-11-18 PROCEDURE — 90471 IMMUNIZATION ADMIN: CPT | Performed by: PHYSICIAN ASSISTANT

## 2020-11-18 PROCEDURE — 36415 COLL VENOUS BLD VENIPUNCTURE: CPT | Performed by: PHYSICIAN ASSISTANT

## 2020-11-19 LAB — LEVETIRACETAM SERPL-MCNC: 14.8 UG/ML (ref 10–40)

## 2020-11-20 ENCOUNTER — TELEPHONE (OUTPATIENT)
Dept: NEUROLOGY | Facility: CLINIC | Age: 50
End: 2020-11-20

## 2020-11-20 NOTE — TELEPHONE ENCOUNTER
PT CALLED IN TODAY REGARDING HIS KEPPRA LABS THAT HE HAD DONE ON 11/17/20 AT [facility] STATING THEY ARE STILL NOT IN HIS MYCHART. PLEASE REVIEW RESULTS AND NOTIFY PT.         CALL BACK- [phone]

## 2020-12-22 ENCOUNTER — TELEMEDICINE (OUTPATIENT)
Dept: INTERNAL MEDICINE | Age: 50
End: 2020-12-22
Payer: COMMERCIAL

## 2020-12-22 PROCEDURE — 99213 OFFICE O/P EST LOW 20 MIN: CPT | Performed by: PHYSICIAN ASSISTANT

## 2020-12-22 ASSESSMENT — ENCOUNTER SYMPTOMS
NAUSEA: 0
WHEEZING: 0
BACK PAIN: 0
PHOTOPHOBIA: 0
SHORTNESS OF BREATH: 0
EYE REDNESS: 0
RHINORRHEA: 0
VOMITING: 0
ABDOMINAL PAIN: 0
DIARRHEA: 0
SINUS PRESSURE: 0
CONSTIPATION: 0
COLOR CHANGE: 0
SORE THROAT: 0
CHEST TIGHTNESS: 0
COUGH: 0
EYE PAIN: 0

## 2020-12-22 NOTE — PROGRESS NOTES
2020    TELEHEALTH EVALUATION -- Audio/Visual (During OMO-13 public health emergency)    HPI:    Samia Gómez (:  1970) has requested an audio/video evaluation for the following concern(s):    Seen for follow-up for chronic conditions hypertension, hyperlipidemia, history of seizures, history of hypokalemia, allergies. Patient's blood pressure seems to be stable at this time. Patient's current medication regiment seems to be adequate. Patient denies any chest pain, shortness of breath, palpitations. Patient states compliant with taking medication. Patient is on Vasotec 10 mg twice a day and labetalol 200 mg daily. Patient's hyperlipidemia seems to be fairly well controlled on current medication regimen continue as directed. Patient denies any side effects from lipid lowering medication. Patient states trying to properly eat and exercise as directed. We did increase patient's cholesterol medication the last visit up to 40 mg of Zocor daily order to get his cholesterol checked again in a couple months. Patient continues to follow-up with Dr. Janine Lopez for history of cerebral artery occlusion with cerebral infarction. Patient continues on Keppra 500 mg twice a day. Patient's PSA within normal limits. Patient did get Cologuard test done and it was negative so he will be good for 3 years. Patient continues on Flonase and Singulair for seasonal allergies. Which seems to be stable at this time. She denies any other issues at this time. Review of Systems   Constitutional: Negative for activity change, appetite change, fatigue and unexpected weight change. HENT: Negative for ear pain, postnasal drip, rhinorrhea, sinus pressure, sneezing and sore throat. Eyes: Negative for photophobia, pain and redness. Respiratory: Negative for cough, chest tightness, shortness of breath and wheezing. Cardiovascular: Negative for chest pain, palpitations and leg swelling. Gastrointestinal: Negative for abdominal pain, constipation, diarrhea, nausea and vomiting. Genitourinary: Negative for dysuria, frequency, hematuria and urgency. Musculoskeletal: Negative for arthralgias, back pain, gait problem, joint swelling and myalgias. Skin: Negative for color change, pallor and wound. Neurological: Negative for dizziness, speech difficulty, weakness, light-headedness, numbness and headaches. Hematological: Negative for adenopathy. Does not bruise/bleed easily. Psychiatric/Behavioral: Negative for confusion. The patient is not nervous/anxious. Prior to Visit Medications    Medication Sig Taking? Authorizing Provider   enalapril (VASOTEC) 10 MG tablet TAKE 1 TABLET BY MOUTH TWICE A DAY  EDIE Nichole   labetalol (NORMODYNE) 200 MG tablet TAKE 1 TABLET BY MOUTH EVERY DAY  EDIE Nichole   simvastatin (ZOCOR) 40 MG tablet TAKE 1 TABLET BY MOUTH EVERY DAY AT NIGHT  EDIE Nichole   levETIRAcetam (KEPPRA) 500 MG tablet Take 500 mg by mouth 2 times daily   Historical Provider, MD   Capsicum-Garlic (CAYENNE PLUS GARLIC) 151-735 MG CAPS Take 1 capsule by mouth daily  Historical Provider, MD   Multiple Vitamins-Minerals (THERAPEUTIC MULTIVITAMIN-MINERALS) tablet Take 1 tablet by mouth daily  Historical Provider, MD   fluticasone (FLONASE) 50 MCG/ACT nasal spray 50 sprays  Historical Provider, MD   montelukast (SINGULAIR) 10 MG tablet Take 10 mg by mouth daily  Historical Provider, MD       Social History     Tobacco Use    Smoking status: Never Smoker    Smokeless tobacco: Never Used   Substance Use Topics    Alcohol use: No    Drug use:  No            PHYSICAL EXAMINATION:  [ INSTRUCTIONS:  \"[x]\" Indicates a positive item  \"[]\" Indicates a negative item  -- DELETE ALL ITEMS NOT EXAMINED]  Vital Signs: (As obtained by patient/caregiver or practitioner observation)    Blood pressure-120/80 heart rate-68   respiratory rate-    Temperature-97.2 pulse oximetry- will continue the [address] as directed and continue to follow-up Dr. Maile Frances. Patient will follow back up with me in approximately 3 months. Return in about 3 months (around 3/22/2021), or if symptoms worsen or fail to improve, for Follow up with labs. Jaquelin Corrales is a 44 y.o. male being evaluated by a Virtual Visit (video visit) encounter to address concerns as mentioned above. A caregiver was present when appropriate. Due to this being a TeleHealth encounter (During YQAVO-96 public [facility] emergency), evaluation of the following organ systems was limited: Vitals/Constitutional/EENT/Resp/CV/GI//MS/Neuro/Skin/Heme-Lymph-Imm. Pursuant to the emergency declaration under the [address] [place] [address], [address] [place] [address] authority and the [email or web address] and [employer] Act, this Virtual Visit was conducted with patient's (and/or legal guardian's) consent, to reduce the patient's risk of exposure to COVID-19 and provide necessary medical care. The patient (and/or legal guardian) has also been advised to contact this office for worsening conditions or problems, and seek emergency medical treatment and/or call 911 if deemed necessary. Patient identification was verified at the start of the visit: Yes    Total time spent on this encounter: Not billed by time    Services were provided through a video synchronous discussion virtually to substitute for in-person clinic visit. Patient and provider were located at their individual homes. --EDIE Nichole on 12/22/2020 at 9:41 AM    An electronic signature was used to authenticate this note.

## 2021-05-16 DIAGNOSIS — R56.9 GENERALIZED CONVULSIVE SEIZURES (HCC): ICD-10-CM

## 2021-05-17 RX ORDER — LEVETIRACETAM 500 MG/1
TABLET ORAL
Qty: 180 TABLET | Refills: 0 | Status: SHIPPED | OUTPATIENT
Start: 2021-05-17 | End: 2021-08-09

## 2021-08-06 ENCOUNTER — TELEPHONE (OUTPATIENT)
Dept: NEUROLOGY | Facility: CLINIC | Age: 51
End: 2021-08-06

## 2021-08-06 DIAGNOSIS — R56.9 GENERALIZED CONVULSIVE SEIZURES (HCC): Primary | ICD-10-CM

## 2021-08-06 NOTE — TELEPHONE ENCOUNTER
Provider: DR. RUBI    Caller: MOISES    Relationship to Patient: SELF    Phone Number: [phone]    Reason for Call:   REQUESTING A CALL FROM DAVID SO NOTHING GETS LOST IN TRANSLATION THROUGH ME:    1. WOULD LIKE TO CHANGE 8-27 APPT TO A MYCHART VISIT. IM UNABLE TO CHANGE THIS AT THE HUB    2. WOULD LIKE NEW ORDER FOR LIPID PROFILE. SAYS HAS BEEN UNABLE TO GET IN WITH PCP. PCP HAS BEEN OUT DUE TO .     3. CAN OFFICE MAIL HIM A COPY OF THE ORDER? WOULD LIKE TO COMPLETE AT [facility] BECAUSE THAT'S WHERE ORDER FOR OTHER LABS THROUGH PCP ARE. IF CAN'T MAIL, HE COULD PICKUP A COPY.

## 2021-08-06 NOTE — TELEPHONE ENCOUNTER
----- Message from Edis Karimi MD sent at 8/6/2021  3:45 PM CDT -----  Okay for video visit  ----- Message -----  From: Asiya Danielle LPN  Sent: 8/6/2021   3:10 PM CDT  To: Edis Karimi MD    Can he do a video visit?  ----- Message -----  From: Edis Karimi MD  Sent: 8/6/2021   2:44 PM CDT  To: Asiya Danielle LPN    Orders in epic  ----- Message -----  From: Asiya Danielle LPN  Sent: 8/6/2021   1:49 PM CDT  To: Edis Karimi MD    Nathaniel would like a video visit for his appointment on 8-27.  He would like to get lab done before his appointment and wants to know if you will order a lipid panel.  His PCP is out of the office for  reasons.  He wants the orders mailed to him so he can get them done at a clinic near him.

## 2021-08-06 NOTE — TELEPHONE ENCOUNTER
Nathaniel notified Dr. Karimi said he can do a video visit.  He ordered lab, the orders have been mailed to him.

## 2021-08-09 DIAGNOSIS — R56.9 GENERALIZED CONVULSIVE SEIZURES (HCC): ICD-10-CM

## 2021-08-09 RX ORDER — LEVETIRACETAM 500 MG/1
TABLET ORAL
Qty: 180 TABLET | Refills: 0 | Status: SHIPPED | OUTPATIENT
Start: 2021-08-09 | End: 2021-11-18

## 2021-08-19 ENCOUNTER — TELEPHONE (OUTPATIENT)
Dept: NEUROLOGY | Facility: CLINIC | Age: 51
End: 2021-08-19

## 2021-08-25 ENCOUNTER — HOSPITAL ENCOUNTER (INPATIENT)
Age: 51
LOS: 1 days | Discharge: HOME | DRG: 897 | End: 2021-08-26
Attending: EMERGENCY MEDICINE
Payer: COMMERCIAL

## 2021-08-25 ENCOUNTER — APPOINTMENT (OUTPATIENT)
Dept: CT IMAGING | Age: 51
DRG: 897 | End: 2021-08-25
Payer: COMMERCIAL

## 2021-08-25 DIAGNOSIS — F10.920 ACUTE ALCOHOLIC INTOXICATION WITHOUT COMPLICATION (HCC): ICD-10-CM

## 2021-08-25 DIAGNOSIS — R79.89 ELEVATED LFTS: ICD-10-CM

## 2021-08-25 DIAGNOSIS — R41.82 ALTERED MENTAL STATUS, UNSPECIFIED ALTERED MENTAL STATUS TYPE: Primary | ICD-10-CM

## 2021-08-25 PROBLEM — F10.121 ACUTE ALCOHOL INTOXICATION DELIRIUM WITH MILD USE DISORDER (HCC): Status: ACTIVE | Noted: 2021-08-25

## 2021-08-25 PROBLEM — E03.9 HYPOTHYROID: Status: ACTIVE | Noted: 2021-08-25

## 2021-08-25 PROBLEM — E86.0 DEHYDRATION: Status: ACTIVE | Noted: 2021-08-25

## 2021-08-25 LAB
ACETAMINOPHEN LEVEL: <15 UG/ML
ALBUMIN SERPL-MCNC: 4.6 G/DL (ref 3.5–5.2)
ALP BLD-CCNC: 121 U/L (ref 40–130)
ALT SERPL-CCNC: 174 U/L (ref 5–41)
AMPHETAMINE SCREEN, URINE: NEGATIVE
ANION GAP SERPL CALCULATED.3IONS-SCNC: 16 MMOL/L (ref 7–19)
APTT: 25.1 SEC (ref 26–36.2)
AST SERPL-CCNC: 115 U/L (ref 5–40)
BARBITURATE SCREEN URINE: NEGATIVE
BASOPHILS ABSOLUTE: 0.1 K/UL (ref 0–0.2)
BASOPHILS RELATIVE PERCENT: 1.9 % (ref 0–1)
BENZODIAZEPINE SCREEN, URINE: NEGATIVE
BILIRUB SERPL-MCNC: 0.3 MG/DL (ref 0.2–1.2)
BILIRUBIN URINE: NEGATIVE
BLOOD, URINE: NEGATIVE
BUN BLDV-MCNC: 8 MG/DL (ref 6–20)
CALCIUM SERPL-MCNC: 8.6 MG/DL (ref 8.6–10)
CANNABINOID SCREEN URINE: NEGATIVE
CHLORIDE BLD-SCNC: 106 MMOL/L (ref 98–111)
CLARITY: CLEAR
CO2: 24 MMOL/L (ref 22–29)
COCAINE METABOLITE SCREEN URINE: NEGATIVE
COLOR: YELLOW
CREAT SERPL-MCNC: 0.8 MG/DL (ref 0.5–1.2)
EKG P AXIS: 55 DEGREES
EKG P-R INTERVAL: 144 MS
EKG Q-T INTERVAL: 436 MS
EKG QRS DURATION: 90 MS
EKG QTC CALCULATION (BAZETT): 455 MS
EKG T AXIS: 70 DEGREES
EOSINOPHILS ABSOLUTE: 0.2 K/UL (ref 0–0.6)
EOSINOPHILS RELATIVE PERCENT: 2.1 % (ref 0–5)
ETHANOL: 319 MG/DL (ref 0–0.08)
ETHANOL: 469 MG/DL (ref 0–0.08)
GFR AFRICAN AMERICAN: >59
GFR NON-AFRICAN AMERICAN: >60
GLUCOSE BLD-MCNC: 81 MG/DL (ref 74–109)
GLUCOSE URINE: 250 MG/DL
HCT VFR BLD CALC: 49.5 % (ref 42–52)
HEMOGLOBIN: 16.1 G/DL (ref 14–18)
IMMATURE GRANULOCYTES #: 0 K/UL
INR BLD: 0.88 (ref 0.88–1.18)
KETONES, URINE: NEGATIVE MG/DL
LEUKOCYTE ESTERASE, URINE: NEGATIVE
LYMPHOCYTES ABSOLUTE: 4.1 K/UL (ref 1.1–4.5)
LYMPHOCYTES RELATIVE PERCENT: 57.8 % (ref 20–40)
Lab: NORMAL
MCH RBC QN AUTO: 32.6 PG (ref 27–31)
MCHC RBC AUTO-ENTMCNC: 32.5 G/DL (ref 33–37)
MCV RBC AUTO: 100.2 FL (ref 80–94)
MONOCYTES ABSOLUTE: 1 K/UL (ref 0–0.9)
MONOCYTES RELATIVE PERCENT: 13.7 % (ref 0–10)
NEUTROPHILS ABSOLUTE: 1.7 K/UL (ref 1.5–7.5)
NEUTROPHILS RELATIVE PERCENT: 24.1 % (ref 50–65)
NITRITE, URINE: NEGATIVE
OPIATE SCREEN URINE: NEGATIVE
PDW BLD-RTO: 14.2 % (ref 11.5–14.5)
PH UA: 5.5 (ref 5–8)
PLATELET # BLD: 304 K/UL (ref 130–400)
PMV BLD AUTO: 9 FL (ref 9.4–12.4)
POTASSIUM SERPL-SCNC: 4.4 MMOL/L (ref 3.5–5)
PROTEIN UA: NEGATIVE MG/DL
PROTHROMBIN TIME: 12.2 SEC (ref 12–14.6)
RBC # BLD: 4.94 M/UL (ref 4.7–6.1)
SALICYLATE, SERUM: <3 MG/DL (ref 3–10)
SARS-COV-2, NAAT: NOT DETECTED
SODIUM BLD-SCNC: 146 MMOL/L (ref 136–145)
SPECIFIC GRAVITY UA: 1.01 (ref 1–1.03)
T4 FREE: 0.78 NG/DL (ref 0.93–1.7)
TOTAL PROTEIN: 7.7 G/DL (ref 6.6–8.7)
TROPONIN: <0.01 NG/ML (ref 0–0.03)
TSH REFLEX FT4: 7.03 UIU/ML (ref 0.35–5.5)
UROBILINOGEN, URINE: 0.2 E.U./DL
WBC # BLD: 7 K/UL (ref 4.8–10.8)

## 2021-08-25 PROCEDURE — 84439 ASSAY OF FREE THYROXINE: CPT

## 2021-08-25 PROCEDURE — 96372 THER/PROPH/DIAG INJ SC/IM: CPT

## 2021-08-25 PROCEDURE — 6360000002 HC RX W HCPCS: Performed by: INTERNAL MEDICINE

## 2021-08-25 PROCEDURE — HZ2ZZZZ DETOXIFICATION SERVICES FOR SUBSTANCE ABUSE TREATMENT: ICD-10-PCS | Performed by: FAMILY MEDICINE

## 2021-08-25 PROCEDURE — 2500000003 HC RX 250 WO HCPCS: Performed by: INTERNAL MEDICINE

## 2021-08-25 PROCEDURE — 84484 ASSAY OF TROPONIN QUANT: CPT

## 2021-08-25 PROCEDURE — 2580000003 HC RX 258: Performed by: FAMILY MEDICINE

## 2021-08-25 PROCEDURE — G0378 HOSPITAL OBSERVATION PER HR: HCPCS

## 2021-08-25 PROCEDURE — 80053 COMPREHEN METABOLIC PANEL: CPT

## 2021-08-25 PROCEDURE — 93005 ELECTROCARDIOGRAM TRACING: CPT | Performed by: EMERGENCY MEDICINE

## 2021-08-25 PROCEDURE — 85730 THROMBOPLASTIN TIME PARTIAL: CPT

## 2021-08-25 PROCEDURE — 84443 ASSAY THYROID STIM HORMONE: CPT

## 2021-08-25 PROCEDURE — 81003 URINALYSIS AUTO W/O SCOPE: CPT

## 2021-08-25 PROCEDURE — 80307 DRUG TEST PRSMV CHEM ANLYZR: CPT

## 2021-08-25 PROCEDURE — 99284 EMERGENCY DEPT VISIT MOD MDM: CPT

## 2021-08-25 PROCEDURE — 70450 CT HEAD/BRAIN W/O DYE: CPT

## 2021-08-25 PROCEDURE — 93010 ELECTROCARDIOGRAM REPORT: CPT | Performed by: INTERNAL MEDICINE

## 2021-08-25 PROCEDURE — 6360000002 HC RX W HCPCS: Performed by: EMERGENCY MEDICINE

## 2021-08-25 PROCEDURE — 87635 SARS-COV-2 COVID-19 AMP PRB: CPT

## 2021-08-25 PROCEDURE — 2700000000 HC OXYGEN THERAPY PER DAY

## 2021-08-25 PROCEDURE — 85610 PROTHROMBIN TIME: CPT

## 2021-08-25 PROCEDURE — 96374 THER/PROPH/DIAG INJ IV PUSH: CPT

## 2021-08-25 PROCEDURE — 6370000000 HC RX 637 (ALT 250 FOR IP): Performed by: NURSE PRACTITIONER

## 2021-08-25 PROCEDURE — 51702 INSERT TEMP BLADDER CATH: CPT

## 2021-08-25 PROCEDURE — 80143 DRUG ASSAY ACETAMINOPHEN: CPT

## 2021-08-25 PROCEDURE — 96375 TX/PRO/DX INJ NEW DRUG ADDON: CPT

## 2021-08-25 PROCEDURE — 96365 THER/PROPH/DIAG IV INF INIT: CPT

## 2021-08-25 PROCEDURE — 36415 COLL VENOUS BLD VENIPUNCTURE: CPT

## 2021-08-25 PROCEDURE — 82077 ASSAY SPEC XCP UR&BREATH IA: CPT

## 2021-08-25 PROCEDURE — 80179 DRUG ASSAY SALICYLATE: CPT

## 2021-08-25 PROCEDURE — 1210000000 HC MED SURG R&B

## 2021-08-25 PROCEDURE — P9047 ALBUMIN (HUMAN), 25%, 50ML: HCPCS | Performed by: INTERNAL MEDICINE

## 2021-08-25 PROCEDURE — 85025 COMPLETE CBC W/AUTO DIFF WBC: CPT

## 2021-08-25 PROCEDURE — 2580000003 HC RX 258: Performed by: INTERNAL MEDICINE

## 2021-08-25 RX ORDER — LORAZEPAM 2 MG/ML
2 INJECTION INTRAMUSCULAR
Status: DISCONTINUED | OUTPATIENT
Start: 2021-08-25 | End: 2021-08-26 | Stop reason: HOSPADM

## 2021-08-25 RX ORDER — POLYETHYLENE GLYCOL 3350 17 G/17G
17 POWDER, FOR SOLUTION ORAL DAILY PRN
Status: DISCONTINUED | OUTPATIENT
Start: 2021-08-25 | End: 2021-08-26 | Stop reason: HOSPADM

## 2021-08-25 RX ORDER — LORAZEPAM 1 MG/1
3 TABLET ORAL
Status: DISCONTINUED | OUTPATIENT
Start: 2021-08-25 | End: 2021-08-26 | Stop reason: HOSPADM

## 2021-08-25 RX ORDER — ENALAPRIL MALEATE 10 MG/1
10 TABLET ORAL 2 TIMES DAILY
COMMUNITY
End: 2021-09-01

## 2021-08-25 RX ORDER — LORAZEPAM 2 MG/ML
3 INJECTION INTRAMUSCULAR
Status: DISCONTINUED | OUTPATIENT
Start: 2021-08-25 | End: 2021-08-26 | Stop reason: HOSPADM

## 2021-08-25 RX ORDER — SODIUM CHLORIDE 9 MG/ML
25 INJECTION, SOLUTION INTRAVENOUS PRN
Status: DISCONTINUED | OUTPATIENT
Start: 2021-08-25 | End: 2021-08-26 | Stop reason: HOSPADM

## 2021-08-25 RX ORDER — LORAZEPAM 1 MG/1
4 TABLET ORAL
Status: DISCONTINUED | OUTPATIENT
Start: 2021-08-25 | End: 2021-08-26 | Stop reason: HOSPADM

## 2021-08-25 RX ORDER — SIMVASTATIN 40 MG
40 TABLET ORAL NIGHTLY
COMMUNITY
End: 2021-10-26

## 2021-08-25 RX ORDER — ACETAMINOPHEN 650 MG/1
650 SUPPOSITORY RECTAL EVERY 6 HOURS PRN
Status: DISCONTINUED | OUTPATIENT
Start: 2021-08-25 | End: 2021-08-26 | Stop reason: HOSPADM

## 2021-08-25 RX ORDER — LORAZEPAM 2 MG/ML
4 INJECTION INTRAMUSCULAR
Status: DISCONTINUED | OUTPATIENT
Start: 2021-08-25 | End: 2021-08-26 | Stop reason: HOSPADM

## 2021-08-25 RX ORDER — LEVETIRACETAM 500 MG/1
500 TABLET ORAL 2 TIMES DAILY
COMMUNITY

## 2021-08-25 RX ORDER — SODIUM CHLORIDE 0.9 % (FLUSH) 0.9 %
5-40 SYRINGE (ML) INJECTION EVERY 12 HOURS SCHEDULED
Status: DISCONTINUED | OUTPATIENT
Start: 2021-08-25 | End: 2021-08-26 | Stop reason: HOSPADM

## 2021-08-25 RX ORDER — LORAZEPAM 1 MG/1
2 TABLET ORAL
Status: DISCONTINUED | OUTPATIENT
Start: 2021-08-25 | End: 2021-08-26 | Stop reason: HOSPADM

## 2021-08-25 RX ORDER — ATORVASTATIN CALCIUM 20 MG/1
20 TABLET, FILM COATED ORAL NIGHTLY
Status: DISCONTINUED | OUTPATIENT
Start: 2021-08-25 | End: 2021-08-26 | Stop reason: HOSPADM

## 2021-08-25 RX ORDER — NALOXONE HYDROCHLORIDE 0.4 MG/ML
0.4 INJECTION, SOLUTION INTRAMUSCULAR; INTRAVENOUS; SUBCUTANEOUS ONCE
Status: COMPLETED | OUTPATIENT
Start: 2021-08-25 | End: 2021-08-25

## 2021-08-25 RX ORDER — SODIUM CHLORIDE 0.9 % (FLUSH) 0.9 %
5-40 SYRINGE (ML) INJECTION PRN
Status: DISCONTINUED | OUTPATIENT
Start: 2021-08-25 | End: 2021-08-26 | Stop reason: HOSPADM

## 2021-08-25 RX ORDER — LEVETIRACETAM 500 MG/1
500 TABLET ORAL 2 TIMES DAILY
Status: DISCONTINUED | OUTPATIENT
Start: 2021-08-25 | End: 2021-08-26 | Stop reason: HOSPADM

## 2021-08-25 RX ORDER — ONDANSETRON 2 MG/ML
4 INJECTION INTRAMUSCULAR; INTRAVENOUS EVERY 6 HOURS PRN
Status: DISCONTINUED | OUTPATIENT
Start: 2021-08-25 | End: 2021-08-26 | Stop reason: HOSPADM

## 2021-08-25 RX ORDER — NICOTINE 21 MG/24HR
1 PATCH, TRANSDERMAL 24 HOURS TRANSDERMAL DAILY
Status: DISCONTINUED | OUTPATIENT
Start: 2021-08-25 | End: 2021-08-26 | Stop reason: HOSPADM

## 2021-08-25 RX ORDER — LORAZEPAM 2 MG/ML
1 INJECTION INTRAMUSCULAR
Status: DISCONTINUED | OUTPATIENT
Start: 2021-08-25 | End: 2021-08-26 | Stop reason: HOSPADM

## 2021-08-25 RX ORDER — ENALAPRIL MALEATE 10 MG/1
10 TABLET ORAL 2 TIMES DAILY
Status: DISCONTINUED | OUTPATIENT
Start: 2021-08-25 | End: 2021-08-26 | Stop reason: HOSPADM

## 2021-08-25 RX ORDER — LORAZEPAM 1 MG/1
1 TABLET ORAL
Status: DISCONTINUED | OUTPATIENT
Start: 2021-08-25 | End: 2021-08-26 | Stop reason: HOSPADM

## 2021-08-25 RX ORDER — ONDANSETRON 4 MG/1
4 TABLET, ORALLY DISINTEGRATING ORAL EVERY 8 HOURS PRN
Status: DISCONTINUED | OUTPATIENT
Start: 2021-08-25 | End: 2021-08-26 | Stop reason: HOSPADM

## 2021-08-25 RX ORDER — LABETALOL 200 MG/1
200 TABLET, FILM COATED ORAL DAILY
Status: DISCONTINUED | OUTPATIENT
Start: 2021-08-25 | End: 2021-08-26 | Stop reason: HOSPADM

## 2021-08-25 RX ORDER — LABETALOL 200 MG/1
200 TABLET, FILM COATED ORAL DAILY
COMMUNITY
End: 2021-09-01

## 2021-08-25 RX ORDER — ALBUMIN (HUMAN) 12.5 G/50ML
25 SOLUTION INTRAVENOUS ONCE
Status: COMPLETED | OUTPATIENT
Start: 2021-08-25 | End: 2021-08-25

## 2021-08-25 RX ORDER — ACETAMINOPHEN 325 MG/1
650 TABLET ORAL EVERY 6 HOURS PRN
Status: DISCONTINUED | OUTPATIENT
Start: 2021-08-25 | End: 2021-08-26 | Stop reason: HOSPADM

## 2021-08-25 RX ORDER — THIAMINE HYDROCHLORIDE 100 MG/ML
100 INJECTION, SOLUTION INTRAMUSCULAR; INTRAVENOUS DAILY
Status: DISCONTINUED | OUTPATIENT
Start: 2021-08-26 | End: 2021-08-26 | Stop reason: HOSPADM

## 2021-08-25 RX ADMIN — ALBUMIN (HUMAN) 25 G: 0.25 INJECTION, SOLUTION INTRAVENOUS at 04:16

## 2021-08-25 RX ADMIN — ATORVASTATIN CALCIUM 20 MG: 20 TABLET, FILM COATED ORAL at 22:19

## 2021-08-25 RX ADMIN — NALXONE HYDROCHLORIDE 0.4 MG: 0.4 INJECTION INTRAMUSCULAR; INTRAVENOUS; SUBCUTANEOUS at 00:43

## 2021-08-25 RX ADMIN — SODIUM CHLORIDE, PRESERVATIVE FREE 10 ML: 5 INJECTION INTRAVENOUS at 23:09

## 2021-08-25 RX ADMIN — SODIUM CHLORIDE, PRESERVATIVE FREE 10 ML: 5 INJECTION INTRAVENOUS at 10:07

## 2021-08-25 RX ADMIN — FOLIC ACID: 5 INJECTION, SOLUTION INTRAMUSCULAR; INTRAVENOUS; SUBCUTANEOUS at 05:48

## 2021-08-25 RX ADMIN — LEVETIRACETAM 500 MG: 500 TABLET ORAL at 22:19

## 2021-08-25 RX ADMIN — ENALAPRIL MALEATE 10 MG: 10 TABLET ORAL at 22:19

## 2021-08-25 RX ADMIN — ENOXAPARIN SODIUM 40 MG: 40 INJECTION SUBCUTANEOUS at 10:04

## 2021-08-25 ASSESSMENT — PAIN SCALES - GENERAL
PAINLEVEL_OUTOF10: 0
PAINLEVEL_OUTOF10: 0

## 2021-08-25 NOTE — ED NOTES
Pt presents by [facility]. EMS states that they were called to pts home for unresponsive. EMS states that upon their arrival pt was responsive to pain. Family on scene reports pt has been like this for several days. EMS states that family was concerned that pt may have overdose on Beta Blockers or Benadryl. EMS states that there were bottles to suggest overdose.       Femi Helm RN  08/25/21 8711

## 2021-08-25 NOTE — PROGRESS NOTES
4 Eyes Skin Assessment    Pablo Lu is being assessed upon: Admission    I agree that IAmber RN, along with Jamie Villavicencio RN (either 2 RN's or 1 LPN and 1 RN) have performed a thorough Head to Toe Skin Assessment on the patient. ALL assessment sites listed below have been assessed. Areas assessed by both nurses:     [x]   Head, Face, and Ears   [x]   Shoulders, Back, and Chest  [x]   Arms, Elbows, and Hands   [x]   Coccyx, Sacrum, and Ischium  [x]   Legs, Feet, and Heels    Does the Patient have Skin Breakdown?  No    Cr Prevention initiated: No  Wound Care Orders initiated: No    WOC nurse consulted for Pressure Injury (Stage 3,4, Unstageable, DTI, NWPT, and Complex wounds) and New or Established Ostomies: No        Primary Nurse eSignature: PERLA Clark on 8/25/2021 at 4:01 AM      Co-Signer eSignature: {Esignature:[id number]}

## 2021-08-25 NOTE — H&P
HISTORY AND PHYSICAL             Date: 8/25/2021        Patient Name: Jolie Norman     YOB: 1970      Age:  46 y.o. Chief Complaint     Chief Complaint   Patient presents with    Altered Mental Status     family states pt has been like this for couple days. Acute alcohol intoxication with delirium     History Obtained From   Patient     History of Present Illness   Patient's is brought in for acute alcohol intoxication and poisoning and he is not able to converse at this time and his mental status he is sleepy at this time . Acute etoh intoxication     Level 469     Unclear history     He has history of hypertension and then hypotension     Past Medical History     Past Medical History:   Diagnosis Date    Stroke [facility])         Past Surgical History   History reviewed. No pertinent surgical history. Medications Prior to Admission     Prior to Admission medications    Not on File        Allergies   Dilantin [phenytoin]    Social History     Social History     Tobacco History     Smoking Status  Never Smoker    Smokeless Tobacco Use  Never Used          Alcohol History     Alcohol Use Status  Never          Drug Use     Drug Use Status  Never          Sexual Activity     Sexually Active  Not Asked                Family History   History reviewed. No pertinent family history. Review of Systems   Review of Systems   Unable to perform ROS: Patient unresponsive       Physical Exam   BP (!) 124/90   Pulse 70   Temp 97.9 °F (36.6 °C) (Axillary)   Resp 16   Ht 5' 7\" (1.702 m)   Wt 170 lb (77.1 kg)   SpO2 100%   BMI 26.63 kg/m²     Physical Exam  Vitals and nursing note reviewed. Constitutional:       Appearance: He is ill-appearing. Comments: Sleepy and able to maintain airway    HENT:      Head: Normocephalic. Nose: Nose normal.   Eyes:      General: Scleral icterus present.       Conjunctiva/sclera: Conjunctivae normal.   Cardiovascular:      Rate and Rhythm: Regular rhythm. Tachycardia present. Pulmonary:      Breath sounds: Wheezing and rales present. Abdominal:      General: Abdomen is flat. Bowel sounds are normal.   Musculoskeletal:      Cervical back: Normal range of motion. Right lower leg: Edema present. Left lower leg: Edema present. Skin:     Coloration: Skin is jaundiced.    Neurological:      Comments: Cannot assess          Labs      Recent Results (from the past 24 hour(s))   COVID-19, Rapid    Collection Time: 08/25/21 12:27 AM    Specimen: Nasopharyngeal Swab   Result Value Ref Range    SARS-CoV-2, NAAT Not Detected Not Detected   Urinalysis Reflex to Culture    Collection Time: 08/25/21 12:28 AM    Specimen: Urine, clean catch   Result Value Ref Range    Color, UA YELLOW Straw/Yellow    Clarity, UA Clear Clear    Glucose, Ur 250 (A) Negative mg/dL    Bilirubin Urine Negative Negative    Ketones, Urine Negative Negative mg/dL    Specific Gravity, UA 1.011 1.005 - 1.030    Blood, Urine Negative Negative    pH, UA 5.5 5.0 - 8.0    Protein, UA Negative Negative mg/dL    Urobilinogen, Urine 0.2 <2.0 E.U./dL    Nitrite, Urine Negative Negative    Leukocyte Esterase, Urine Negative Negative   Urine Drug Screen    Collection Time: 08/25/21 12:28 AM   Result Value Ref Range    Amphetamine Screen, Urine Negative Negative <1000 ng/mL    Barbiturate Screen, Ur Negative Negative < 200 ng/mL    Benzodiazepine Screen, Urine Negative Negative <100 ng/mL    Cannabinoid Scrn, Ur Negative Negative <50 ng/mL    Cocaine Metabolite Screen, Urine Negative Negative <300 ng/mL    Opiate Scrn, Ur Negative Negative < 300 ng/mL    Drug Screen Comment: see below    APTT    Collection Time: 08/25/21 12:29 AM   Result Value Ref Range    aPTT 25.1 (L) 26.0 - 36.2 sec   CBC Auto Differential    Collection Time: 08/25/21 12:29 AM   Result Value Ref Range    WBC 7.0 4.8 - 10.8 K/uL    RBC 4.94 4.70 - 6.10 M/uL    Hemoglobin 16.1 14.0 - 18.0 g/dL    Hematocrit 49.5 42.0 - 52.0 %    .2 (H) 80.0 - 94.0 fL    MCH 32.6 (H) 27.0 - 31.0 pg    MCHC 32.5 (L) 33.0 - 37.0 g/dL    RDW 14.2 11.5 - 14.5 %    Platelets 231 987 - 059 K/uL    MPV 9.0 (L) 9.4 - 12.4 fL    Neutrophils % 24.1 (L) 50.0 - 65.0 %    Lymphocytes % 57.8 (H) 20.0 - 40.0 %    Monocytes % 13.7 (H) 0.0 - 10.0 %    Eosinophils % 2.1 0.0 - 5.0 %    Basophils % 1.9 (H) 0.0 - 1.0 %    Neutrophils Absolute 1.7 1.5 - 7.5 K/uL    Immature Granulocytes # 0.0 K/uL    Lymphocytes Absolute 4.1 1.1 - 4.5 K/uL    Monocytes Absolute 1.00 (H) 0.00 - 0.90 K/uL    Eosinophils Absolute 0.20 0.00 - 0.60 K/uL    Basophils Absolute 0.10 0.00 - 0.20 K/uL   Comprehensive Metabolic Panel    Collection Time: 08/25/21 12:29 AM   Result Value Ref Range    Sodium 146 (H) 136 - 145 mmol/L    Potassium 4.4 3.5 - 5.0 mmol/L    Chloride 106 98 - 111 mmol/L    CO2 24 22 - 29 mmol/L    Anion Gap 16 7 - 19 mmol/L    Glucose 81 74 - 109 mg/dL    BUN 8 6 - 20 mg/dL    CREATININE 0.8 0.5 - 1.2 mg/dL    GFR Non-African American >60 >60    GFR African American >59 >59    Calcium 8.6 8.6 - 10.0 mg/dL    Total Protein 7.7 6.6 - 8.7 g/dL    Albumin 4.6 3.5 - 5.2 g/dL    Total Bilirubin 0.3 0.2 - 1.2 mg/dL    Alkaline Phosphatase 121 40 - 130 U/L     (H) 5 - 41 U/L     (H) 5 - 40 U/L   Protime-INR    Collection Time: 08/25/21 12:29 AM   Result Value Ref Range    Protime 12.2 12.0 - 14.6 sec    INR 0.88 0.88 - 1.18   Troponin    Collection Time: 08/25/21 12:29 AM   Result Value Ref Range    Troponin <0.01 0.00 - 0.03 ng/mL   TSH WITH REFLEX TO FT4    Collection Time: 08/25/21 12:29 AM   Result Value Ref Range    TSH Reflex FT4 7.03 (H) 0.35 - 5.50 uIU/mL   Ethanol    Collection Time: 08/25/21 12:29 AM   Result Value Ref Range    Ethanol Lvl 469 mg/dL   Acetaminophen Level    Collection Time: 08/25/21 12:29 AM   Result Value Ref Range    Acetaminophen Level <40 ug/mL   Salicylate    Collection Time: 08/25/21 12:29 AM   Result Value Ref Range Salicylate, Serum <2.9 3.0 - 10.0 mg/dL   T4, Free    Collection Time: 08/25/21 12:29 AM   Result Value Ref Range    T4 Free 0.78 (L) 0.93 - 1.70 ng/dL        Imaging/Diagnostics Last 24 Hours       Assessment      Hospital Problems         Last Modified POA    Acute alcohol intoxication delirium with mild use disorder ([facility] [address] 8/25/2021 Yes    Elevated liver function tests 8/25/2021 Yes    Dehydration 8/25/2021 Yes    Hypothyroid 8/25/2021 Yes          Plan   1. Admitted and brought in with acute etoh alcohol intoxication     And monitor and detox and ciwa protocol  And fluids    2.   Elevated liver tests   Expected     3  Monitor mental status     Consultations Ordered:  None    Electronically signed by Eileen Castro MD on 8/25/21 at 2:06 AM CDT

## 2021-08-25 NOTE — ED PROVIDER NOTES
pulses. Heart sounds: Normal heart sounds. No murmur heard. Pulmonary:      Effort: Pulmonary effort is normal.      Breath sounds: Normal breath sounds. No wheezing or rales. Abdominal:      Palpations: Abdomen is soft. There is no mass. Tenderness: There is no abdominal tenderness. Musculoskeletal:         General: Normal range of motion. Cervical back: Normal range of motion and neck supple. Skin:     General: Skin is warm and dry. Neurological:      Mental Status: He is alert. GCS: GCS eye subscore is 3. GCS verbal subscore is 4. GCS motor subscore is 5. Cranial Nerves: No dysarthria or facial asymmetry. Motor: No abnormal muscle tone. DIAGNOSTIC RESULTS     EKG: All EKG's areinterpreted by the Emergency Department Physician who either signs or Co-signs this chart in the absence of a cardiologist.    71 normal sinus rhythm no obvious ST changes nondiagnostic EKG    RADIOLOGY:  Non-plain film images such as CT, Ultrasound and MRI are read by the radiologist. Plain radiographic images are visualized and preliminarily interpreted bythe emergency physician with the below findings:        [address]    (Results Pending)   PreliminaryFindingsOnly-- See Final Report For Complete Findings  CT HEAD:  No acute intracranial hemorrhage, edema or mass. Mild atrophyand periventricular white matter chronic microvascular ischemic changes. Chronic appearing left external capsule infarct. Several small pontine hypodensitieswhich maybe skull base artifact or small ischemic foci of  indeterminate acuity. Correlate with MRI of the brain, as clinicallyindicated. No extra-axial fluid collection. No calvarial fracture. Visualized orbits, paranasal sinuses and mastoids are unremarkable.         LABS:  Labs Reviewed   APTT - Abnormal; Notable for the following components:       Result Value    aPTT 25.1 (*)     All other components within normal limits   CBC WITH AUTO DIFFERENTIAL - Abnormal; Notable for the following components:    .2 (*)     MCH 32.6 (*)     MCHC 32.5 (*)     MPV 9.0 (*)     Neutrophils % 24.1 (*)     Lymphocytes % 57.8 (*)     Monocytes % 13.7 (*)     Basophils % 1.9 (*)     Monocytes Absolute 1.00 (*)     All other components within normal limits   COMPREHENSIVE METABOLIC PANEL - Abnormal; Notable for the following components:    Sodium 146 (*)      (*)      (*)     All other components within normal limits   TSH WITH REFLEX TO FT4 - Abnormal; Notable for the following components:    TSH Reflex FT4 7.03 (*)     All other components within normal limits   URINE RT REFLEX TO CULTURE - Abnormal; Notable for the following components:    Glucose, Ur 250 (*)     All other components within normal limits   T4, FREE - Abnormal; Notable for the following components:    T4 Free 0.78 (*)     All other components within normal limits   COVID-19, RAPID   PROTIME-INR   TROPONIN   URINE DRUG SCREEN   ETHANOL   ACETAMINOPHEN LEVEL   SALICYLATE LEVEL       All other labs were within normal range or not returned as of this dictation.     EMERGENCY DEPARTMENT COURSE and DIFFERENTIAL DIAGNOSIS/MDM:   Vitals:    Vitals:    08/25/21 0025 08/25/21 0150   BP: (!) 142/92 (!) 124/90   Pulse: 70 70   Resp: 20 16   Temp: 97.9 °F (36.6 °C)    TempSrc: Axillary    SpO2: 97% 100%   Weight: 170 lb (77.1 kg)    Height: 5' 7\" (1.702 m)        MDM  Number of Diagnoses or Management Options     Amount and/or Complexity of Data Reviewed  Clinical lab tests: ordered and reviewed  Tests in the radiology section of CPT®: ordered and reviewed  Discuss the patient with other providers: yes  Independent visualization of images, tracings, or specimens: yes      Unclear if pt took any pills as GF that RN spoke to had no specific details, etoh intoxication 469, other labs reassuring, mild LFT elevation likely due to etoh abuse, ct head still pending, once medically clear may need psych shon, have d/w Dr. Ivan Simmons for admission      CONSULTS:  None    PROCEDURES:  Unless otherwise noted below, none     Procedures    FINAL IMPRESSION      1. Altered mental status, unspecified altered mental status type    2. Acute alcoholic intoxication without complication (HCC)    3. Elevated LFTs          DISPOSITION/PLAN   DISPOSITION        PATIENT REFERRED TO:  No follow-up provider specified.     DISCHARGE MEDICATIONS:  New Prescriptions    No medications on file          (Please note that portions of this note were completed with a voice recognition program.  Efforts were made to edit thedictations but occasionally words are mis-transcribed.)    Arie Gamez MD (electronically signed)  Attending Emergency Physician          Maurizio Shane MD  08/25/21 8482       Maurizio Shane MD  08/25/21 8737

## 2021-08-25 NOTE — SIGNIFICANT EVENT
Patient examined at bedside. Security called this morning due to agitation when nursing staff was unable to get him water due to NPO status. He has since calmed. Passed bedside swallowing evaluation, diet restarted. Still intoxicated with ethanol > 300. Reevaluate tomorrow morning. Patient states the pills on his couch were Benadryl and he did not actually take them.

## 2021-08-25 NOTE — PROGRESS NOTES
Report to 5th floor Diogenes DUNCAN. Pt to transfer to [facility] with telemetry.  Electronically signed by Sari Navarro RN on 8/25/2021 at 1:35 PM

## 2021-08-25 NOTE — PROGRESS NOTES
Phoenix Harness arrived to room # 730. Presented with: Alcohol intoxication  Mental Status: Patient is disoriented. Vitals:    08/25/21 0317   BP:    Pulse: 72   Resp:    Temp:    SpO2:      Patient safety contract and falls prevention contract reviewed with patient. No,  unable to review at this time, pt is intoxicated. Oriented Patient to room. Call light within reach. Yes.   Needs, issues or concerns expressed at this time: no.    Electronically signed by Mayank Gore RN on 8/25/2021 at 4:01 AM

## 2021-08-25 NOTE — ED NOTES
Patient placed on cardiac monitor, continuous pulse oximeter, and NIBP monitor. Monitor alarms on.          Jennifer Dillard RN  08/25/21 0635

## 2021-08-25 NOTE — PLAN OF CARE
Problem: Falls - Risk of:  Goal: Will remain free from falls  Description: Will remain free from falls  Outcome: Ongoing  Goal: Absence of physical injury  Description: Absence of physical injury  Outcome: Ongoing     Problem: Confusion - Acute:  Goal: Absence of continued neurological deterioration signs and symptoms  Description: Absence of continued neurological deterioration signs and symptoms  Outcome: Ongoing  Goal: Mental status will be restored to baseline  Description: Mental status will be restored to baseline  Outcome: Ongoing     Problem: Discharge Planning:  Goal: Ability to perform activities of daily living will improve  Description: Ability to perform activities of daily living will improve  Outcome: Ongoing  Goal: Participates in care planning  Description: Participates in care planning  Outcome: Ongoing     Problem: Injury - Risk of, Physical Injury:  Goal: Will remain free from falls  Description: Will remain free from falls  Outcome: Ongoing  Goal: Absence of physical injury  Description: Absence of physical injury  Outcome: Ongoing     Problem: Mood - Altered:  Goal: Mood stable  Description: Mood stable  Outcome: Ongoing  Goal: Absence of abusive behavior  Description: Absence of abusive behavior  Outcome: Ongoing  Goal: Verbalizations of feeling emotionally comfortable while being cared for will increase  Description: Verbalizations of feeling emotionally comfortable while being cared for will increase  Outcome: Ongoing     Problem: Psychomotor Activity - Altered:  Goal: Absence of psychomotor disturbance signs and symptoms  Description: Absence of psychomotor disturbance signs and symptoms  Outcome: Ongoing     Problem: Sensory Perception - Impaired:  Goal: Demonstrations of improved sensory functioning will increase  Description: Demonstrations of improved sensory functioning will increase  Outcome: Ongoing  Goal: Decrease in sensory misperception frequency  Description: Decrease in sensory misperception frequency  Outcome: Ongoing  Goal: Able to refrain from responding to false sensory perceptions  Description: Able to refrain from responding to false sensory perceptions  Outcome: Ongoing  Goal: Demonstrates accurate environmental perceptions  Description: Demonstrates accurate environmental perceptions  Outcome: Ongoing  Goal: Able to distinguish between reality-based and nonreality-based thinking  Description: Able to distinguish between reality-based and nonreality-based thinking  Outcome: Ongoing  Goal: Able to interrupt nonreality-based thinking  Description: Able to interrupt nonreality-based thinking  Outcome: Ongoing     Problem: Sleep Pattern Disturbance:  Goal: Appears well-rested  Description: Appears well-rested  Outcome: Ongoing     Problem: Skin Integrity:  Goal: Will show no infection signs and symptoms  Description: Will show no infection signs and symptoms  Outcome: Ongoing  Goal: Absence of new skin breakdown  Description: Absence of new skin breakdown  Outcome: Ongoing   Electronically signed by Elisabeth Mcmanus RN on 8/25/2021 at 3:57 AM

## 2021-08-25 NOTE — ED NOTES
Spoke with Monique Leong [phone]. Per girlfriend pt has been \"sleepy\" for 2 days. Pt states that tonight she found \"little pink pills all over the couch. \"     Chuck Weaver RN  08/25/21 6811

## 2021-08-25 NOTE — PROGRESS NOTES
Early this AM shift. Pt has started shouting at PCA that he was thirsty and wanted a drink. PCA explained he was NPO ,but he would ask his nurse. Pt kept shouting and security was called. Charge RN Vinicio Parker went to room and explained to pt behavior was unacceptable. Pt calmed and has been polite since. HR sinus. Pt has no c/o pain. Pt has Banana bag if IVF infusing at 100/hr. Pt requested hannah be removed and I will remove as pt is alert, continent and can void per urinal or commode. Pt is alert and oriented. CIWA 2. Slight tremors noted in hands and arms. Pt denies any SI. Lungs CTA. No edema.  Electronically signed by Cyrus Ludwig RN on 8/25/2021 at 1:34 PM

## 2021-08-25 NOTE — ED NOTES
Pt smells of Listerine. Pt states that he has not been drinking alcohol or mouthwash.       Cain Christian RN  08/25/21 9693

## 2021-08-26 VITALS
SYSTOLIC BLOOD PRESSURE: 150 MMHG | HEIGHT: 67 IN | DIASTOLIC BLOOD PRESSURE: 94 MMHG | TEMPERATURE: 97.6 F | HEART RATE: 67 BPM | BODY MASS INDEX: 28.41 KG/M2 | WEIGHT: 181 LBS | RESPIRATION RATE: 16 BRPM | OXYGEN SATURATION: 94 %

## 2021-08-26 DIAGNOSIS — I10 ESSENTIAL HYPERTENSION: ICD-10-CM

## 2021-08-26 PROBLEM — F10.121 ACUTE ALCOHOL INTOXICATION DELIRIUM WITH MILD USE DISORDER (HCC): Status: RESOLVED | Noted: 2021-08-25 | Resolved: 2021-08-26

## 2021-08-26 LAB
ALBUMIN SERPL-MCNC: 4.2 G/DL (ref 3.5–5.2)
ALP BLD-CCNC: 87 U/L (ref 40–130)
ALT SERPL-CCNC: 111 U/L (ref 5–41)
ANION GAP SERPL CALCULATED.3IONS-SCNC: 15 MMOL/L (ref 7–19)
AST SERPL-CCNC: 67 U/L (ref 5–40)
BILIRUB SERPL-MCNC: 0.8 MG/DL (ref 0.2–1.2)
BUN BLDV-MCNC: 5 MG/DL (ref 6–20)
CALCIUM SERPL-MCNC: 8.5 MG/DL (ref 8.6–10)
CHLORIDE BLD-SCNC: 95 MMOL/L (ref 98–111)
CO2: 26 MMOL/L (ref 22–29)
CREAT SERPL-MCNC: 0.7 MG/DL (ref 0.5–1.2)
ETHANOL: <10 MG/DL (ref 0–0.08)
GFR AFRICAN AMERICAN: >59
GFR NON-AFRICAN AMERICAN: >60
GLUCOSE BLD-MCNC: 111 MG/DL (ref 74–109)
HCT VFR BLD CALC: 38.6 % (ref 42–52)
HEMOGLOBIN: 13 G/DL (ref 14–18)
INR BLD: 0.96 (ref 0.88–1.18)
MAGNESIUM: 1.6 MG/DL (ref 1.6–2.6)
MCH RBC QN AUTO: 32.7 PG (ref 27–31)
MCHC RBC AUTO-ENTMCNC: 33.7 G/DL (ref 33–37)
MCV RBC AUTO: 97 FL (ref 80–94)
PDW BLD-RTO: 13.5 % (ref 11.5–14.5)
PLATELET # BLD: 224 K/UL (ref 130–400)
PMV BLD AUTO: 10 FL (ref 9.4–12.4)
POTASSIUM REFLEX MAGNESIUM: 3.3 MMOL/L (ref 3.5–5)
PROTHROMBIN TIME: 13 SEC (ref 12–14.6)
RBC # BLD: 3.98 M/UL (ref 4.7–6.1)
SODIUM BLD-SCNC: 136 MMOL/L (ref 136–145)
TOTAL PROTEIN: 6.6 G/DL (ref 6.6–8.7)
WBC # BLD: 5.6 K/UL (ref 4.8–10.8)

## 2021-08-26 PROCEDURE — 2580000003 HC RX 258: Performed by: FAMILY MEDICINE

## 2021-08-26 PROCEDURE — 6360000002 HC RX W HCPCS: Performed by: FAMILY MEDICINE

## 2021-08-26 PROCEDURE — 85027 COMPLETE CBC AUTOMATED: CPT

## 2021-08-26 PROCEDURE — G0378 HOSPITAL OBSERVATION PER HR: HCPCS

## 2021-08-26 PROCEDURE — 83735 ASSAY OF MAGNESIUM: CPT

## 2021-08-26 PROCEDURE — 85610 PROTHROMBIN TIME: CPT

## 2021-08-26 PROCEDURE — 36415 COLL VENOUS BLD VENIPUNCTURE: CPT

## 2021-08-26 PROCEDURE — 96372 THER/PROPH/DIAG INJ SC/IM: CPT

## 2021-08-26 PROCEDURE — 82077 ASSAY SPEC XCP UR&BREATH IA: CPT

## 2021-08-26 PROCEDURE — 96375 TX/PRO/DX INJ NEW DRUG ADDON: CPT

## 2021-08-26 PROCEDURE — 80053 COMPREHEN METABOLIC PANEL: CPT

## 2021-08-26 PROCEDURE — 6370000000 HC RX 637 (ALT 250 FOR IP): Performed by: NURSE PRACTITIONER

## 2021-08-26 RX ORDER — FOLIC ACID 1 MG/1
1 TABLET ORAL DAILY
Qty: 30 TABLET | Refills: 0 | Status: SHIPPED | OUTPATIENT
Start: 2021-08-26 | End: 2022-06-01

## 2021-08-26 RX ORDER — THIAMINE MONONITRATE (VIT B1) 100 MG
100 TABLET ORAL DAILY
Qty: 30 TABLET | Refills: 0 | Status: SHIPPED | OUTPATIENT
Start: 2021-08-26

## 2021-08-26 RX ORDER — LABETALOL 200 MG/1
TABLET, FILM COATED ORAL
Qty: 14 TABLET | Refills: 0 | Status: SHIPPED | OUTPATIENT
Start: 2021-08-26 | End: 2021-09-01 | Stop reason: SDUPTHER

## 2021-08-26 RX ORDER — LABETALOL 200 MG/1
TABLET, FILM COATED ORAL
Qty: 90 TABLET | Refills: 3 | OUTPATIENT
Start: 2021-08-26

## 2021-08-26 RX ORDER — NICOTINE 21 MG/24HR
1 PATCH, TRANSDERMAL 24 HOURS TRANSDERMAL DAILY
Qty: 30 PATCH | Refills: 0 | Status: SHIPPED | OUTPATIENT
Start: 2021-08-26 | End: 2021-09-01

## 2021-08-26 RX ADMIN — THIAMINE HYDROCHLORIDE 100 MG: 100 INJECTION, SOLUTION INTRAMUSCULAR; INTRAVENOUS at 10:51

## 2021-08-26 RX ADMIN — ENOXAPARIN SODIUM 40 MG: 40 INJECTION SUBCUTANEOUS at 10:00

## 2021-08-26 RX ADMIN — ENALAPRIL MALEATE 10 MG: 10 TABLET ORAL at 10:00

## 2021-08-26 RX ADMIN — LEVETIRACETAM 500 MG: 500 TABLET ORAL at 10:00

## 2021-08-26 RX ADMIN — SODIUM CHLORIDE, PRESERVATIVE FREE 10 ML: 5 INJECTION INTRAVENOUS at 10:51

## 2021-08-26 RX ADMIN — LABETALOL HYDROCHLORIDE 200 MG: 200 TABLET, FILM COATED ORAL at 00:48

## 2021-08-26 ASSESSMENT — PAIN SCALES - GENERAL: PAINLEVEL_OUTOF10: 0

## 2021-08-26 NOTE — TELEPHONE ENCOUNTER
Kishore called requesting a refill of the below medication which has been pended for you:     Requested Prescriptions     Pending Prescriptions Disp Refills    labetalol (NORMODYNE) 200 MG tablet [Pharmacy Med Name: LABETALOL  MG TABLET] 90 tablet 3     Sig: TAKE 1 TABLET BY MOUTH EVERY DAY       Last Appointment Date: 12/22/2020  Next Appointment Date: 9/1/2021    Allergies   Allergen Reactions    Phenytoin      rash

## 2021-08-26 NOTE — DISCHARGE SUMMARY
Naty Clark  :  1970  MRN:  [id number]    Admit date:  2021  Discharge date:      Admitting Physician:  No admitting provider for patient encounter. Advance Directive: Full Code    Consults: none    Primary Care Physician:  EDIE Greenwood    Discharge Diagnoses:  Principal Problem (Resolved):    Acute alcohol intoxication delirium with mild use disorder ([facility] [address]  Active Problems:    Elevated liver function tests    Dehydration    Hypothyroid      Significant Diagnostic Studies:   CT HEAD WO CONTRAST    Result Date: 2021  Examination. CT HEAD WO CONTRAST 2021 1:04 AM History: Altered mental status. DLP: 784 mGycm. The CT scan of the head is performed without intravenous contrast enhancement. The images are acquired in axial plane with subsequent reconstruction in coronal and sagittal planes. There is no previous study for comparison. There is no evidence of mass. No midline shift. There is no evidence of intracranial hemorrhage or hematoma. The moderately prominent ventricles, basal cistern and the cortical sulci suggest moderate chronic volume loss. There are scattered areas of chronic white matter ischemia in the centrum semiovale bilaterally. The gray-white matter differentiation is maintained. There are irregular hypodensity in the per which are probably due to skull base artifact. Any coincidental acute or subacute ischemic changes may not be excluded. The images reviewed in bone window show no acute bony abnormality. Visualized paranasal sinuses and mastoid air cells are unremarkable. Ill-defined hypodensities in the per are probably due to the skull base artifacts. Any coincidental acute ischemic abnormality may not be excluded. If clinically warranted please obtain MR imaging of the brain. Chronic ischemic and atrophic changes in the remaining brain. The above study was initially reviewed and reported by [employer]. I do not find any discrepancies.  Signed by Dr Karen Bee Anwer      Pertinent Labs:   CBC:   Recent Labs     08/25/21 0029 08/26/21 0417   WBC 7.0 5.6   HGB 16.1 13.0*    224     BMP:    Recent Labs     08/25/21 0029 08/26/21 0417   * 136   K 4.4 3.3*    95*   CO2 24 26   BUN 8 5*   CREATININE 0.8 0.7   GLUCOSE 81 111*     INR:   Recent Labs     08/25/21 0029 08/26/21 0417   INR 0.88 0.96     ABGs:No results for input(s): PH, PO2, PCO2, HCO3, BE, O2SAT in the last 72 hours. Lactic Acid:No results for input(s): LACTA in the last 72 hours. Procedures: None performed. Hospital Course:   8-25: Patient presented to the ED overnight with confusion and lethargy for a few days. Family concerned about drug overdose. On Keppra, possible history of seizures. Ethanol level 469. Admitted to PCU for detox on withdrawal protocol. Transferred to med/surg when his mental status cleared in the morning. 8-26: Patient is back to baseline. Minimal slight tremor without other signs and symptoms of withdrawal. Patient states he is not a heavy daily drinker. I doubt this but he is not open to medical help with his drinking today, will discharge to home.     Physical Exam:  Vitals: BP (!) 150/94   Pulse 67   Temp 97.6 °F (36.4 °C) (Temporal)   Resp 16   Ht 5' 7\" (1.702 m)   Wt 181 lb (82.1 kg)   SpO2 94%   BMI 28.35 kg/m²   24HR INTAKE/OUTPUT:      Intake/Output Summary (Last 24 hours) at 8/26/2021 0849  Last data filed at 8/25/2021 1200  Gross per 24 hour   Intake 240 ml   Output 150 ml   Net 90 ml     General appearance: alert and cooperative with exam  HEENT: atraumatic, eyes with clear conjunctiva and normal lids, pupils and irises normal, external ears and nose are normal, lips normal. Neck without masses, lympadenopathy, bruit, thyroid normal  Lungs: no increased work of breathing, clear to auscultation bilaterally without rales, rhonchi or wheezes  Heart: regular rate and rhythm, S1, S2 normal, no murmur, click, rub or gallop  Abdomen: soft, non-tender; bowel sounds normal; no masses,  no organomegaly  Extremities: extremities normal without clubbing, atraumatic, no cyanosis or edema  Neurologic: no focal neurologic deficits, normal sensation, alert and oriented, affect and mood appropriate  Skin: no jaundice, rashes, or nodules    Discharge Medications:       Ananth Barragan   Home Medication Instructions MPQ:[id number]    Printed on:08/26/21 0879   Medication Information                      enalapril (VASOTEC) 10 MG tablet  Take 10 mg by mouth 2 times daily             folic acid (FOLVITE) 1 MG tablet  Take 1 tablet by mouth daily             labetalol (NORMODYNE) 200 MG tablet  Take 200 mg by mouth daily             levETIRAcetam (KEPPRA) 500 MG tablet  Take 500 mg by mouth 2 times daily             nicotine (NICODERM CQ) 21 MG/24HR  Place 1 patch onto the skin daily             simvastatin (ZOCOR) 40 MG tablet  Take 40 mg by mouth nightly             vitamin B-1 (THIAMINE) 100 MG tablet  Take 1 tablet by mouth daily                 Discharge Instructions: Follow up with EDIE Mak in 7 days. Take medications as directed. Resume activity as tolerated. Diet: ADULT DIET; Regular     Disposition: Patient is medically stable and will be discharged Home. Time spent on discharge 20 minutes.     Signed:  Shanice Dolan DO

## 2021-08-26 NOTE — TELEPHONE ENCOUNTER
Kishore called requesting a refill of the below medication which has been pended for you:     Requested Prescriptions     Signed Prescriptions Disp Refills    labetalol (NORMODYNE) 200 MG tablet 14 tablet 0     Sig: TAKE 1 TABLET BY MOUTH EVERY DAY     Authorizing Provider: Snow CLAYTON     Ordering User: Shanae Causey     Refused Prescriptions Disp Refills    labetalol (Jonothan Posey) 200 MG tablet [Pharmacy Med Name: LABETALOL  MG TABLET] 90 tablet 3     Sig: TAKE 1 TABLET BY MOUTH EVERY DAY     Refused By: Zenia Saunders     Reason for Refusal: Refill not appropriate       Last Appointment Date: 12/22/2020  Next Appointment Date: 9/1/2021    Allergies   Allergen Reactions    Phenytoin      rash

## 2021-08-26 NOTE — PROGRESS NOTES
Discharge instructions discussed with patient who verbalized understanding. Patient discharged home via private vehicle.

## 2021-08-26 NOTE — CARE COORDINATION
Attempted to discuss alcohol rehab with pt at bedside. Pt had been discharged and was no longer in his room.

## 2021-08-27 ENCOUNTER — CARE COORDINATION (OUTPATIENT)
Dept: CASE MANAGEMENT | Age: 51
End: 2021-08-27

## 2021-08-27 NOTE — CARE COORDINATION
[facility] Transitions Initial Follow Up Call    Call within 2 business days of discharge: Yes    Patient: Sabi Segovia Patient : 1970   MRN: [id number]    Discharge Date: 21 RARS: Readmission Risk Score: 14      Last Discharge [address] [address]       Complaint Diagnosis Description Type Department Provider    21 Altered Mental Status Altered mental status, unspecified altered mental status type . .. ED to Hosp-Admission (Discharged) (ADMITTED) [facility] Tawnya Castaneda DO; Gisela Fam MD; Ch... Spoke with: N/A    Facility: [address] [place] [address]    Non-face-to-face services provided:  Reviewed encounter information for continuity of care prior to follow up Care Transitions phone call - chart notes, consults, progress notes, test results, med list, appointments, AVS, other information. Care Transitions 24 Hour Call    Care Transitions Interventions       Attempted to make contact with patient/caregiver for an initial transitions of care follow up call post discharge without success. Unable to leave a message regarding intent of call and call back information. Call was forwarded to an unidentifiable voice messaging system (mobile voice mail or telephone answering machine). CTN will follow up again at a later time. Any previously scheduled hospital follow up appointments noted below. Follow Up  No future appointments.     Ortega Garay RN

## 2021-08-30 ENCOUNTER — CARE COORDINATION (OUTPATIENT)
Dept: CASE MANAGEMENT | Age: 51
End: 2021-08-30

## 2021-08-30 NOTE — CARE COORDINATION
[facility]    Call within 2 business days of discharge: Yes    Patient: Sabi Segovia Patient : 1970   MRN: [id number]    Discharge Date: 21 RARS: Readmission Risk Score: 14      Last Discharge [address] [address]       Complaint Diagnosis Description Type Department Provider    21 Altered Mental Status Altered mental status, unspecified altered mental status type . .. ED to Hosp-Admission (Discharged) (ADMITTED) [facility] Tawnya Castaneda DO; Gisela Fam MD; Ch... Spoke with: [facility] [address] [facility]: [facility]      Non-face-to-face services provided:  Reviewed encounter information for continuity of care prior to follow up Care Transitions phone call - chart notes, consults, progress notes, test results, med list, appointments, AVS, other information. Care Transitions 24 Hour Call    Schedule Follow Up Appointment with PCP: Completed  Do you have any ongoing symptoms?: No  Do you have a copy of your discharge instructions?: Yes  Do you have all of your prescriptions and are they filled?: Yes  Have you been contacted by a [address]?: No  Have you scheduled your follow up appointment?: Yes  How are you going to get to your appointment?: Car - family or friend to transport  Were you discharged with any Home Care or Post Acute Services: No  Do you feel like you have everything you need to keep you well at home?: Yes  Care Transitions Interventions       Placed a call to the number listed and spoke with patient for an initial follow up call for Care Transitions Coordination following discharge from the hospital.  He reported that he is doing very well. He said that he has not had any issues since returning home. He said that he is sleeping well. He has all of his meds and is taking them as ordered. He said he didn't not get the Nicotine patches filled as he did not see any rason for it, as he has never smoked.   He said that he is eating and drinking well. He said he has not had any alcohol since returning home. He is not aware of any issues. He does not see any reason for any further CTN calls. He said that he is aware of his follow up appointment with Timo Conde NP, in place of his PCP, EDIE Crespo. He is appreciative of call and will call prn any needs. Discussed COVID 19 information, risks, signs, quarantine, infection control, vaccines, etc.  Patient aware and voices understanding. Given the opportunity to ask questions and answered appropriately. Ensured to have CTN contact information to be used as needed. Encouraged to call for new issues, questions, concerns, etc.  No further scheduled calls needed at this time.      Follow Up  Future Appointments   Date Time Provider Tani Floyd   9/1/2021 12:30 PM Elise Snellen, APRN [facility]       Willis Mcleod, RN

## 2021-09-01 ENCOUNTER — OFFICE VISIT (OUTPATIENT)
Dept: INTERNAL MEDICINE | Age: 51
End: 2021-09-01
Payer: COMMERCIAL

## 2021-09-01 VITALS — BODY MASS INDEX: 28.82 KG/M2 | DIASTOLIC BLOOD PRESSURE: 70 MMHG | SYSTOLIC BLOOD PRESSURE: 138 MMHG | WEIGHT: 184 LBS

## 2021-09-01 DIAGNOSIS — F10.920 ALCOHOLIC INTOXICATION WITHOUT COMPLICATION (HCC): ICD-10-CM

## 2021-09-01 DIAGNOSIS — I10 ESSENTIAL HYPERTENSION: ICD-10-CM

## 2021-09-01 DIAGNOSIS — Z00.00 WELL ADULT EXAM: Primary | ICD-10-CM

## 2021-09-01 PROBLEM — F10.929 ALCOHOL INTOXICATION (HCC): Status: ACTIVE | Noted: 2021-08-25

## 2021-09-01 PROCEDURE — 1111F DSCHRG MED/CURRENT MED MERGE: CPT | Performed by: NURSE PRACTITIONER

## 2021-09-01 PROCEDURE — 99495 TRANSJ CARE MGMT MOD F2F 14D: CPT | Performed by: NURSE PRACTITIONER

## 2021-09-01 RX ORDER — LABETALOL 200 MG/1
TABLET, FILM COATED ORAL
Qty: 90 TABLET | Refills: 1 | Status: SHIPPED | OUTPATIENT
Start: 2021-09-01 | End: 2022-03-01 | Stop reason: SDUPTHER

## 2021-09-01 RX ORDER — ENALAPRIL MALEATE 10 MG/1
TABLET ORAL
Qty: 180 TABLET | Refills: 1 | Status: SHIPPED | OUTPATIENT
Start: 2021-09-01 | End: 2022-05-02

## 2021-09-01 SDOH — ECONOMIC STABILITY: FOOD INSECURITY: WITHIN THE PAST 12 MONTHS, YOU WORRIED THAT YOUR FOOD WOULD RUN OUT BEFORE YOU GOT MONEY TO BUY MORE.: NEVER TRUE

## 2021-09-01 SDOH — ECONOMIC STABILITY: FOOD INSECURITY: WITHIN THE PAST 12 MONTHS, THE FOOD YOU BOUGHT JUST DIDN'T LAST AND YOU DIDN'T HAVE MONEY TO GET MORE.: NEVER TRUE

## 2021-09-01 ASSESSMENT — SOCIAL DETERMINANTS OF HEALTH (SDOH): HOW HARD IS IT FOR YOU TO PAY FOR THE VERY BASICS LIKE FOOD, HOUSING, MEDICAL CARE, AND HEATING?: NOT HARD AT ALL

## 2021-09-01 ASSESSMENT — PATIENT HEALTH QUESTIONNAIRE - PHQ9
SUM OF ALL RESPONSES TO PHQ QUESTIONS 1-9: 0
SUM OF ALL RESPONSES TO PHQ QUESTIONS 1-9: 0
SUM OF ALL RESPONSES TO PHQ9 QUESTIONS 1 & 2: 0
SUM OF ALL RESPONSES TO PHQ QUESTIONS 1-9: 0
1. LITTLE INTEREST OR PLEASURE IN DOING THINGS: 0
2. FEELING DOWN, DEPRESSED OR HOPELESS: 0

## 2021-09-01 NOTE — PROGRESS NOTES
Post-Discharge Transitional Care Management Services or Hospital Follow Up      Ady Ruano   YOB: 1970    Date of Office Visit:  9/1/2021  Date of Hospital Admission: 8/25/21  Date of Hospital Discharge: 8/26/21  Risk of hospital readmission (high >=14%.  Medium >=10%) :Readmission Risk Score: 14      Care management risk score Rising risk (score 2-5) and Complex Care (Scores >=6): 2     Non face to face  following discharge, date last encounter closed (first attempt may have been earlier): 8/30/2021 11:47 AM    Call initiated 2 business days of discharge: Yes    Patient Active Problem List   Diagnosis    Alcohol intoxication ([facility] [address]    Elevated liver function tests    Dehydration    Hypothyroid    Mixed hyperlipidemia    Hypertension    Fatty liver    Asthma    Cerebral artery occlusion with cerebral infarction ([facility] [address]    Well adult exam       Allergies   Allergen Reactions    Dilantin [Phenytoin]     Phenytoin      rash       Medications listed as ordered at the time of discharge from hospital   Lynden Krabbe   [place] Medication Instructions DOTTIE:    Printed on:09/01/21 1301   Medication Information                      enalapril (VASOTEC) 10 MG tablet  TAKE 1 TABLET BY MOUTH TWICE A DAY             fluticasone (FLONASE) 50 MCG/ACT nasal spray  50 sprays             folic acid (FOLVITE) 1 MG tablet  Take 1 tablet by mouth daily             labetalol (NORMODYNE) 200 MG tablet  TAKE 1 TABLET BY MOUTH EVERY DAY             levETIRAcetam (KEPPRA) 500 MG tablet  Take 500 mg by mouth 2 times daily             montelukast (SINGULAIR) 10 MG tablet  Take 10 mg by mouth daily             Multiple Vitamins-Minerals (THERAPEUTIC MULTIVITAMIN-MINERALS) tablet  Take 1 tablet by mouth daily             simvastatin (ZOCOR) 40 MG tablet  Take 40 mg by mouth nightly             vitamin B-1 (THIAMINE) 100 MG tablet  Take 1 tablet by mouth daily                   Medications marked \"taking\" at this time  Outpatient Medications Marked as Taking for the 9/1/21 encounter (Office Visit) with MICHAEL Palma   Medication Sig Dispense Refill    labetalol (NORMODYNE) 200 MG tablet TAKE 1 TABLET BY MOUTH EVERY DAY 90 tablet 1    enalapril (VASOTEC) 10 MG tablet TAKE 1 TABLET BY MOUTH TWICE A  tablet 1    vitamin B-1 (THIAMINE) 100 MG tablet Take 1 tablet by mouth daily 30 tablet 0    folic acid (FOLVITE) 1 MG tablet Take 1 tablet by mouth daily 30 tablet 0    simvastatin (ZOCOR) 40 MG tablet Take 40 mg by mouth nightly      levETIRAcetam (KEPPRA) 500 MG tablet Take 500 mg by mouth 2 times daily      Multiple Vitamins-Minerals (THERAPEUTIC MULTIVITAMIN-MINERALS) tablet Take 1 tablet by mouth daily      fluticasone (FLONASE) 50 MCG/ACT nasal spray 50 sprays      montelukast (SINGULAIR) 10 MG tablet Take 10 mg by mouth daily          Medications patient taking as of now reconciled against medications ordered at time of hospital discharge: Yes    Chief Complaint   Patient presents with    Follow-Up from Hospital       History of Present illness - Follow up of Hospital diagnosis(es):     1. Alcohol related confusion; His BA was 496    He was kept in PCU and detox; He did well and was d/c   Inpatient course: Discharge summary reviewed- see chart. Interval history/Current status:   1. Alcohol intoxication  2. Hypothyroidism; has been up for a while    A comprehensive review of systems was negative except for what was noted in the HPI. Vitals:    09/01/21 1240   BP: 138/70   Weight: 184 lb (83.5 kg)     Body mass index is 28.82 kg/m².    Wt Readings from Last 3 Encounters:   09/01/21 184 lb (83.5 kg)   08/25/21 181 lb (82.1 kg)   01/23/20 178 lb (80.7 kg)     BP Readings from Last 3 Encounters:   09/01/21 138/70   08/26/21 (!) 150/94   01/23/20 130/80        Physical Exam:  General Appearance: alert and oriented to person, place and time, well developed and well- nourished, in no acute distress  Skin: warm and dry, no rash or erythema  Head: normocephalic and atraumatic  Eyes: pupils equal, round, and reactive to light, extraocular eye movements intact, conjunctivae normal  ENT: tympanic membrane, external ear and ear canal normal bilaterally, nose without deformity, nasal mucosa and turbinates normal without polyps  Neck: supple and non-tender without mass, no thyromegaly or thyroid nodules, no cervical lymphadenopathy  Pulmonary/Chest: clear to auscultation bilaterally- no wheezes, rales or rhonchi, normal air movement, no respiratory distress  Cardiovascular: normal rate, regular rhythm, normal S1 and S2, no murmurs, rubs, clicks, or gallops, distal pulses intact, no carotid bruits  Abdomen: soft, non-tender, non-distended, normal bowel sounds, no masses or organomegaly  Extremities: no cyanosis, clubbing or edema  Musculoskeletal: normal range of motion, no joint swelling, deformity or tenderness  Neurologic: reflexes normal and symmetric, no cranial nerve deficit, gait, coordination and speech normal    Assessment/Plan:  1. Essential hypertension    - labetalol (NORMODYNE) 200 MG tablet; TAKE 1 TABLET BY MOUTH EVERY DAY  Dispense: 180 tablet; Refill: 1  - enalapril (VASOTEC) 10 MG tablet; TAKE 1 TABLET BY MOUTH TWICE A DAY  Dispense: 180 tablet;  Refill: 3  #2 alcohol intoxication he reports this is an isolated incident    Medical Decision Making: moderate complexity

## 2021-09-08 ENCOUNTER — TELEPHONE (OUTPATIENT)
Dept: INTERNAL MEDICINE | Age: 51
End: 2021-09-08

## 2021-09-08 NOTE — TELEPHONE ENCOUNTER
----- Message from Eldon Hagen sent at 8/30/2021  3:28 PM CDT -----  Subject: Message to Provider    QUESTIONS  Information for Provider? PT would like to make his appt VV due to not   being able to leave his GF at home please give him a call   ---------------------------------------------------------------------------  --------------  [address]  What is the best way for the office to contact you? OK to leave message on   voicemail  Preferred Call Back Phone Number? [phone]  ---------------------------------------------------------------------------  --------------  SCRIPT ANSWERS  Relationship to Patient?  Self

## 2021-09-25 PROBLEM — E86.0 DEHYDRATION: Status: RESOLVED | Noted: 2021-08-25 | Resolved: 2021-09-25

## 2021-10-01 PROBLEM — Z00.00 WELL ADULT EXAM: Status: RESOLVED | Noted: 2021-09-01 | Resolved: 2021-10-01

## 2021-10-26 DIAGNOSIS — E78.2 MIXED HYPERLIPIDEMIA: ICD-10-CM

## 2021-10-26 RX ORDER — SIMVASTATIN 40 MG
TABLET ORAL
Qty: 90 TABLET | Refills: 3 | Status: SHIPPED | OUTPATIENT
Start: 2021-10-26 | End: 2022-10-31

## 2021-11-16 DIAGNOSIS — R56.9 GENERALIZED CONVULSIVE SEIZURES (HCC): ICD-10-CM

## 2021-11-18 RX ORDER — LEVETIRACETAM 500 MG/1
TABLET ORAL
Qty: 180 TABLET | Refills: 0 | Status: SHIPPED | OUTPATIENT
Start: 2021-11-18 | End: 2022-01-21 | Stop reason: SDUPTHER

## 2021-12-01 NOTE — TELEPHONE ENCOUNTER
PT RETURNED MSG; ATTEMPTED TO WARM TRANSFER TO CLINIC, BUT NO ANSWER.    PLEASE CALL PT @ [phone]    THANK YOU!   Not applicable

## 2022-01-21 ENCOUNTER — LAB (OUTPATIENT)
Dept: LAB | Facility: HOSPITAL | Age: 52
End: 2022-01-21

## 2022-01-21 ENCOUNTER — OFFICE VISIT (OUTPATIENT)
Dept: NEUROLOGY | Facility: CLINIC | Age: 52
End: 2022-01-21

## 2022-01-21 VITALS
SYSTOLIC BLOOD PRESSURE: 134 MMHG | WEIGHT: 176 LBS | DIASTOLIC BLOOD PRESSURE: 80 MMHG | RESPIRATION RATE: 18 BRPM | BODY MASS INDEX: 25.2 KG/M2 | HEIGHT: 70 IN | HEART RATE: 78 BPM

## 2022-01-21 DIAGNOSIS — R56.9 GENERALIZED CONVULSIVE SEIZURES: ICD-10-CM

## 2022-01-21 DIAGNOSIS — I61.9 HEMORRHAGIC CEREBROVASCULAR ACCIDENT (CVA): Primary | ICD-10-CM

## 2022-01-21 LAB
ALBUMIN SERPL-MCNC: 4.8 G/DL (ref 3.5–5.2)
ALBUMIN/GLOB SERPL: 1.7 G/DL
ALP SERPL-CCNC: 196 U/L (ref 39–117)
ALT SERPL W P-5'-P-CCNC: 104 U/L (ref 1–41)
AMMONIA BLD-SCNC: 35 UMOL/L (ref 16–60)
ANION GAP SERPL CALCULATED.3IONS-SCNC: 13 MMOL/L (ref 5–15)
AST SERPL-CCNC: 156 U/L (ref 1–40)
BASOPHILS # BLD AUTO: 0.07 10*3/MM3 (ref 0–0.2)
BASOPHILS NFR BLD AUTO: 1.1 % (ref 0–1.5)
BILIRUB SERPL-MCNC: 1.3 MG/DL (ref 0–1.2)
BUN SERPL-MCNC: 10 MG/DL (ref 6–20)
BUN/CREAT SERPL: 9.9 (ref 7–25)
CALCIUM SPEC-SCNC: 9.9 MG/DL (ref 8.6–10.5)
CHLORIDE SERPL-SCNC: 95 MMOL/L (ref 98–107)
CHOLEST SERPL-MCNC: 207 MG/DL (ref 0–200)
CO2 SERPL-SCNC: 30 MMOL/L (ref 22–29)
CREAT SERPL-MCNC: 1.01 MG/DL (ref 0.76–1.27)
DEPRECATED RDW RBC AUTO: 46.6 FL (ref 37–54)
EOSINOPHIL # BLD AUTO: 0.21 10*3/MM3 (ref 0–0.4)
EOSINOPHIL NFR BLD AUTO: 3.2 % (ref 0.3–6.2)
ERYTHROCYTE [DISTWIDTH] IN BLOOD BY AUTOMATED COUNT: 13.3 % (ref 12.3–15.4)
GFR SERPL CREATININE-BSD FRML MDRD: 77 ML/MIN/1.73
GLOBULIN UR ELPH-MCNC: 2.8 GM/DL
GLUCOSE SERPL-MCNC: 106 MG/DL (ref 65–99)
HCT VFR BLD AUTO: 41 % (ref 37.5–51)
HDLC SERPL-MCNC: 79 MG/DL (ref 40–60)
HGB BLD-MCNC: 14.1 G/DL (ref 13–17.7)
IMM GRANULOCYTES # BLD AUTO: 0.01 10*3/MM3 (ref 0–0.05)
IMM GRANULOCYTES NFR BLD AUTO: 0.2 % (ref 0–0.5)
LDLC SERPL CALC-MCNC: 105 MG/DL (ref 0–100)
LDLC/HDLC SERPL: 1.28 {RATIO}
LYMPHOCYTES # BLD AUTO: 1.56 10*3/MM3 (ref 0.7–3.1)
LYMPHOCYTES NFR BLD AUTO: 23.9 % (ref 19.6–45.3)
MCH RBC QN AUTO: 32.4 PG (ref 26.6–33)
MCHC RBC AUTO-ENTMCNC: 34.4 G/DL (ref 31.5–35.7)
MCV RBC AUTO: 94.3 FL (ref 79–97)
MONOCYTES # BLD AUTO: 0.48 10*3/MM3 (ref 0.1–0.9)
MONOCYTES NFR BLD AUTO: 7.3 % (ref 5–12)
NEUTROPHILS NFR BLD AUTO: 4.21 10*3/MM3 (ref 1.7–7)
NEUTROPHILS NFR BLD AUTO: 64.3 % (ref 42.7–76)
NRBC BLD AUTO-RTO: 0 /100 WBC (ref 0–0.2)
PLATELET # BLD AUTO: 178 10*3/MM3 (ref 140–450)
PMV BLD AUTO: 10.2 FL (ref 6–12)
POTASSIUM SERPL-SCNC: 3.5 MMOL/L (ref 3.5–5.2)
PROT SERPL-MCNC: 7.6 G/DL (ref 6–8.5)
RBC # BLD AUTO: 4.35 10*6/MM3 (ref 4.14–5.8)
SODIUM SERPL-SCNC: 138 MMOL/L (ref 136–145)
TRIGL SERPL-MCNC: 134 MG/DL (ref 0–150)
VLDLC SERPL-MCNC: 23 MG/DL (ref 5–40)
WBC NRBC COR # BLD: 6.54 10*3/MM3 (ref 3.4–10.8)

## 2022-01-21 PROCEDURE — 36415 COLL VENOUS BLD VENIPUNCTURE: CPT | Performed by: PSYCHIATRY & NEUROLOGY

## 2022-01-21 PROCEDURE — 80177 DRUG SCRN QUAN LEVETIRACETAM: CPT | Performed by: PSYCHIATRY & NEUROLOGY

## 2022-01-21 PROCEDURE — 85025 COMPLETE CBC W/AUTO DIFF WBC: CPT | Performed by: PSYCHIATRY & NEUROLOGY

## 2022-01-21 PROCEDURE — 82140 ASSAY OF AMMONIA: CPT | Performed by: PSYCHIATRY & NEUROLOGY

## 2022-01-21 PROCEDURE — 99214 OFFICE O/P EST MOD 30 MIN: CPT | Performed by: PSYCHIATRY & NEUROLOGY

## 2022-01-21 PROCEDURE — 80053 COMPREHEN METABOLIC PANEL: CPT | Performed by: PSYCHIATRY & NEUROLOGY

## 2022-01-21 PROCEDURE — 80061 LIPID PANEL: CPT

## 2022-01-21 RX ORDER — LEVETIRACETAM 500 MG/1
500 TABLET ORAL 2 TIMES DAILY
Qty: 180 TABLET | Refills: 0 | Status: CANCELLED | OUTPATIENT
Start: 2022-01-21

## 2022-01-21 RX ORDER — ASCORBIC ACID 500 MG
500 TABLET ORAL 2 TIMES DAILY
COMMUNITY
End: 2022-09-15 | Stop reason: SINTOL

## 2022-01-21 RX ORDER — LEVETIRACETAM 500 MG/1
500 TABLET ORAL 2 TIMES DAILY
Qty: 180 TABLET | Refills: 1 | Status: SHIPPED | OUTPATIENT
Start: 2022-01-21 | End: 2022-09-15 | Stop reason: SDUPTHER

## 2022-01-21 NOTE — PROGRESS NOTES
Subjective   Nathaniel Troy, 1970, is a male who is being seen today for   Chief Complaint   Patient presents with   • Seizures   • Stroke       HISTORY OF PRESENT ILLNESS: Extended follow-up.  Patient previously seen for left hemispheric AVM with bleed and stroke with right-sided weakness 2005.  Patient had subsequent seizures and is on Keppra 500 mg p.o. twice daily.  Says he has not had any stroke or seizure symptoms recently.  Patient however was admitted to the hospital on 8/25/2021 with EtOH abnormalities increased liver enzymes and altered mental status.  He had a CT head scan at that time that showed atrophy and small vessel ischemic changes and ill-defined hypodensities in the juliana questionable significance possibly skull artifacts but could not rule out abnormality.  Patient has an IVC filter he does not know what type but we were considering MRI of the brain at some point if he lets us know what type of filter he has.  Patient has not gotten his Keppra level as prescribed.  Patient follows with Marcie Denny and is to follow-up with her about the liver abnormalities.  He has no idea what causing the liver problems.  He has a family history of liver problems.  Patient is to get follow-up blood work today.  Patient denies any headache.    REVIEW OF SYSTEMS:   GENERAL: Patient blood pressure is 130/72 left arm seated 134/80 left arm standing.  Pulse is 78  PULMONARY: No acute respiratory difficulty  CVS: No acute chest pain or palpitation  GASTROINTESTINAL: No acute GI distress other than as above  GENITOURINARY: No acute  distress  GYN: Not applicable  MUSCULOSKELETAL: No acute musculoskeletal symptoms  HEENT: No acute vision or hearing change  ENDOCRINE: Not diabetic  PSYCHIATRIC: No acute psychiatric symptoms  HEMATOLOGY: Mild anemia.  Patient had also low potassium when last blood work  SKIN: No acute skin changes  Family history reviewed as above  Social history: Patient says he is not using alcohol  at this time.  Patient denies smoking or drug use      PHYSICAL EXAMINATION:    GENERAL: Some intentional tremor noted  CRANIUM: Normal cephalic/atraumatic  HEENT:       EYES: EOMs intact without nystagmus and fields full to confrontation.  Pupils equal round reactive to light.  No acute fundic abnormalities.       EARS: Tympanic membranes normal and hears tuning fork bilaterally       THROAT: No acute oropharynx abnormalities.  No swallowing difficulties by history       NECK: No bruits/no lymphadenopathy  CHEST: No acute cardiopulmonary abnormalities  ABDOMEN: Nondistended  EXTREMITIES: Dorsalis pedis pulse symmetrical  NEURO: Patient alert and follows commands without difficulty  SPEECH: Normal    CRANIAL NERVES: Motor/sensory about the face normal and symmetric    MOTOR STRENGTH: Motor strength upper and lower extremities normal  STATION AND GAIT: Gait slightly wide-based but no tendency to fall and Romberg negative  CEREBELLAR: Finger-nose and heel-to-shin normal  SENSORY: Decreased pin and vibration distal approximately lower extremities to ankles bilaterally otherwise normal pin and vibration throughout  REFLEXES: Reflexes hyperactive on the right versus left with some clonus noted and equivocal toe on the right downgoing on the left      ASSESSMENT AND PLAN: Patient with history of stroke and seizures as above.  Patient is to get Keppra level and continue the Keppra as noted.  Patient to get baseline blood work.  Patient to continue driving and seizure precautions as discussed.  Patient to get to emergency room immediately if seizure or stroke occurs.  I spent 30 minutes with this patient with counseling exam and review of records.  Patient's BMI in normal range    Diagnoses and all orders for this visit:    1. Generalized convulsive seizures (HCC)        Dictated utilizing Dragon voice recognition software

## 2022-01-21 NOTE — PATIENT INSTRUCTIONS
Patient to let us know what kind of IVC filter he has.  Patient to continue driving and seizure precautions as discussed.

## 2022-01-24 LAB — LEVETIRACETAM SERPL-MCNC: 27.2 UG/ML (ref 10–40)

## 2022-01-26 ENCOUNTER — TELEPHONE (OUTPATIENT)
Dept: NEUROLOGY | Facility: CLINIC | Age: 52
End: 2022-01-26

## 2022-01-26 NOTE — TELEPHONE ENCOUNTER
Provider: DR.JOHN RUBI    Caller: PATIENT    Relationship to Patient: SELF    Pharmacy: NA    Phone Number: [phone]    Reason for Call: PATIENT ASKING FOR A CALL FROM DAVID TO DISCUSS HIM GETTING AN MRI ON BRAIN DUE TO THE IVC FILTER. PLEASE ADVISE.     When was the patient last seen: 1-21-22

## 2022-01-27 ENCOUNTER — TELEPHONE (OUTPATIENT)
Dept: NEUROLOGY | Facility: CLINIC | Age: 52
End: 2022-01-27

## 2022-01-27 ENCOUNTER — DOCUMENTATION (OUTPATIENT)
Dept: NEUROLOGY | Facility: CLINIC | Age: 52
End: 2022-01-27

## 2022-01-27 NOTE — TELEPHONE ENCOUNTER
Caller: Nathaniel Troy    Relationship: Self    Best call back number: [phone]    What is the best time to reach you: ANY    Who are you requesting to speak with (clinical staff, provider,  specific staff member): DAVID    Do you know the name of the person who called: DAVID    What was the call regarding: MRI RESULTS    Do you require a callback: YES      PT APOLOGIZES FOR MISSING YOUR CALL.  SAID HE WAS AT THE [facility].

## 2022-01-27 NOTE — PROGRESS NOTES
Patient called requesting I return his call in regards to his MRI.  I did attempt to return the call and did leave a message on his voice mail.

## 2022-01-28 ENCOUNTER — DOCUMENTATION (OUTPATIENT)
Dept: NEUROLOGY | Facility: CLINIC | Age: 52
End: 2022-01-28

## 2022-01-28 NOTE — TELEPHONE ENCOUNTER
Caller: Nathaniel Troy     Relationship: Self     Best call back number: [phone]    PT CALLED TO TALK WITH DAVID

## 2022-01-28 NOTE — PROGRESS NOTES
Patient calls and states he has the information on his IVC filter.  He is to bring a copy of that information to the office today. He states if he has an MRI he would like that to be done at [facility].

## 2022-02-21 DIAGNOSIS — R56.9 GENERALIZED CONVULSIVE SEIZURES: Primary | ICD-10-CM

## 2022-02-24 ENCOUNTER — TELEPHONE (OUTPATIENT)
Dept: NEUROLOGY | Facility: CLINIC | Age: 52
End: 2022-02-24

## 2022-02-24 NOTE — TELEPHONE ENCOUNTER
Caller: Nathaniel Troy    Relationship: Self    Best call back number: [phone]    What is the best time to reach you: ANY    Who are you requesting to speak with (clinical staff, provider,  specific staff member): DAVID    What was the call regarding: PT IS WANTING TO DISCUSS THE MRI THAT DR. RUBI ORDERED.  PT STATES THAT IT IS NOT FINANCIALLY FEASIBLE FOR HIM TO HAVE THIS DONE AND WANTS TO DISCUSS THIS W/ DAVID    Do you require a callback: YES

## 2022-02-25 ENCOUNTER — DOCUMENTATION (OUTPATIENT)
Dept: NEUROLOGY | Facility: CLINIC | Age: 52
End: 2022-02-25

## 2022-02-25 DIAGNOSIS — Z00.00 WELL ADULT EXAM: ICD-10-CM

## 2022-02-25 LAB
ALBUMIN SERPL-MCNC: 4.5 G/DL (ref 3.5–5.2)
ALP BLD-CCNC: 110 U/L (ref 40–130)
ALT SERPL-CCNC: 58 U/L (ref 5–41)
ANION GAP SERPL CALCULATED.3IONS-SCNC: 10 MMOL/L (ref 7–19)
AST SERPL-CCNC: 53 U/L (ref 5–40)
BASOPHILS ABSOLUTE: 0.1 K/UL (ref 0–0.2)
BASOPHILS RELATIVE PERCENT: 1.7 % (ref 0–1)
BILIRUB SERPL-MCNC: 0.5 MG/DL (ref 0.2–1.2)
BILIRUBIN URINE: NEGATIVE
BLOOD, URINE: NEGATIVE
BUN BLDV-MCNC: 16 MG/DL (ref 6–20)
CALCIUM SERPL-MCNC: 9.6 MG/DL (ref 8.6–10)
CHLORIDE BLD-SCNC: 106 MMOL/L (ref 98–111)
CHOLESTEROL, TOTAL: 169 MG/DL (ref 160–199)
CLARITY: CLEAR
CO2: 26 MMOL/L (ref 22–29)
COLOR: YELLOW
CREAT SERPL-MCNC: 0.9 MG/DL (ref 0.5–1.2)
EOSINOPHILS ABSOLUTE: 0.5 K/UL (ref 0–0.6)
EOSINOPHILS RELATIVE PERCENT: 7.5 % (ref 0–5)
GFR AFRICAN AMERICAN: >59
GFR NON-AFRICAN AMERICAN: >60
GLUCOSE BLD-MCNC: 103 MG/DL (ref 74–109)
GLUCOSE URINE: NEGATIVE MG/DL
HCT VFR BLD CALC: 48.8 % (ref 42–52)
HDLC SERPL-MCNC: 56 MG/DL (ref 55–121)
HEMOGLOBIN: 15.8 G/DL (ref 14–18)
HEPATITIS C ANTIBODY INTERPRETATION: NORMAL
HIV-1 P24 AG: NORMAL
IMMATURE GRANULOCYTES #: 0 K/UL
KETONES, URINE: NEGATIVE MG/DL
LDL CHOLESTEROL CALCULATED: 97 MG/DL
LEUKOCYTE ESTERASE, URINE: NEGATIVE
LYMPHOCYTES ABSOLUTE: 2.3 K/UL (ref 1.1–4.5)
LYMPHOCYTES RELATIVE PERCENT: 35.6 % (ref 20–40)
MCH RBC QN AUTO: 32.5 PG (ref 27–31)
MCHC RBC AUTO-ENTMCNC: 32.4 G/DL (ref 33–37)
MCV RBC AUTO: 100.4 FL (ref 80–94)
MONOCYTES ABSOLUTE: 0.6 K/UL (ref 0–0.9)
MONOCYTES RELATIVE PERCENT: 8.8 % (ref 0–10)
NEUTROPHILS ABSOLUTE: 3 K/UL (ref 1.5–7.5)
NEUTROPHILS RELATIVE PERCENT: 46.2 % (ref 50–65)
NITRITE, URINE: NEGATIVE
PDW BLD-RTO: 13.1 % (ref 11.5–14.5)
PH UA: 5 (ref 5–8)
PLATELET # BLD: 207 K/UL (ref 130–400)
PMV BLD AUTO: 11.7 FL (ref 9.4–12.4)
POTASSIUM SERPL-SCNC: 4.7 MMOL/L (ref 3.5–5)
PROSTATE SPECIFIC ANTIGEN: 0.44 NG/ML (ref 0–4)
PROTEIN UA: NEGATIVE MG/DL
RAPID HIV 1&2: NORMAL
RBC # BLD: 4.86 M/UL (ref 4.7–6.1)
SODIUM BLD-SCNC: 142 MMOL/L (ref 136–145)
SPECIFIC GRAVITY UA: 1.02 (ref 1–1.03)
TOTAL PROTEIN: 6.9 G/DL (ref 6.6–8.7)
TRIGL SERPL-MCNC: 78 MG/DL (ref 0–149)
TSH SERPL DL<=0.05 MIU/L-ACNC: 10.26 UIU/ML (ref 0.27–4.2)
UROBILINOGEN, URINE: 0.2 E.U./DL
VITAMIN D 25-HYDROXY: 44.7 NG/ML
WBC # BLD: 6.4 K/UL (ref 4.8–10.8)

## 2022-02-25 NOTE — PROGRESS NOTES
Patient calls today to tell me that he cannot do the MRI at this time due to the cost of the MRI.  He states he has a lot of things going on and just cannot afford it.  He states he will get this done when he financially is stable.

## 2022-03-01 ENCOUNTER — OFFICE VISIT (OUTPATIENT)
Dept: INTERNAL MEDICINE | Age: 52
End: 2022-03-01
Payer: COMMERCIAL

## 2022-03-01 VITALS
OXYGEN SATURATION: 99 % | WEIGHT: 185 LBS | SYSTOLIC BLOOD PRESSURE: 130 MMHG | DIASTOLIC BLOOD PRESSURE: 88 MMHG | BODY MASS INDEX: 29.03 KG/M2 | HEART RATE: 62 BPM | HEIGHT: 67 IN

## 2022-03-01 DIAGNOSIS — E55.9 VITAMIN D DEFICIENCY: ICD-10-CM

## 2022-03-01 DIAGNOSIS — K76.0 FATTY LIVER: ICD-10-CM

## 2022-03-01 DIAGNOSIS — E03.9 HYPOTHYROIDISM, UNSPECIFIED TYPE: Primary | ICD-10-CM

## 2022-03-01 DIAGNOSIS — I63.50 CEREBRAL ARTERY OCCLUSION WITH CEREBRAL INFARCTION (HCC): ICD-10-CM

## 2022-03-01 DIAGNOSIS — I10 PRIMARY HYPERTENSION: ICD-10-CM

## 2022-03-01 DIAGNOSIS — I10 ESSENTIAL HYPERTENSION: ICD-10-CM

## 2022-03-01 DIAGNOSIS — E78.2 MIXED HYPERLIPIDEMIA: ICD-10-CM

## 2022-03-01 PROCEDURE — 99214 OFFICE O/P EST MOD 30 MIN: CPT | Performed by: NURSE PRACTITIONER

## 2022-03-01 RX ORDER — LABETALOL 200 MG/1
TABLET, FILM COATED ORAL
Qty: 90 TABLET | Refills: 1 | Status: SHIPPED | OUTPATIENT
Start: 2022-03-01 | End: 2022-07-11

## 2022-03-01 RX ORDER — LEVOTHYROXINE SODIUM 0.03 MG/1
25 TABLET ORAL DAILY
Qty: 90 TABLET | Refills: 1 | Status: SHIPPED | OUTPATIENT
Start: 2022-03-01 | End: 2022-05-24 | Stop reason: SDUPTHER

## 2022-03-01 ASSESSMENT — ENCOUNTER SYMPTOMS
VOMITING: 0
BLOOD IN STOOL: 0
SHORTNESS OF BREATH: 0
CHOKING: 0
ABDOMINAL DISTENTION: 0
EYE ITCHING: 0
EYE DISCHARGE: 0
DIARRHEA: 0
ABDOMINAL PAIN: 0
COUGH: 0
COLOR CHANGE: 0
CONSTIPATION: 0
TROUBLE SWALLOWING: 0
NAUSEA: 0
WHEEZING: 0
SORE THROAT: 0
STRIDOR: 0

## 2022-03-01 NOTE — PROGRESS NOTES
[facility] PHYSICIAN SERVICES  [facility] INTERNAL MEDICINE  [address]  [facility] [address] [place] [address]  Dept: [phone]  Dept Fax: [phone]: [phone]    Gregorio Cordoba (:  1970) is a 45 y.o. male,Established patient  with green, here for evaluation of the following chief complaint(s): [address] [facility] Cesar Greene is a 45 y.o. male who presents today for his medical conditions/complaints as noted below. Gregorio Cordoba is c/bradley 6 Month Follow-Up        HPI:     Chief Complaint   Patient presents with    6 Month Follow-Up     HPI   1. Hypertension; he has been on enalapril 10 mg daily labetalol 200 mg daily 2. Hyperlipidemia; he takes simvastatin 40 mg daily  3. Fatty liver liver functions are up slightly this is not new for him  4. Old CVA with arterial occlusion been fairly stable with this  5. New diagnosis of hypothyroidism with TSH over 10 we will start Synthroid 25 mcg daily    Past Medical History:   Diagnosis Date    Asthma     Cerebral artery occlusion with cerebral infarction ([facility] [address]     Fatty liver     Hemorrhage, intracerebral (HCC)     history of hemorrhage to the brain, resolved. right hemiparesis.  Hypertension     Mixed hyperlipidemia     Stroke [facility])       Past Surgical History:   Procedure Laterality Date    CHOLECYSTECTOMY      with cholangiography  Dr Steph Wong 3/1/2022 2021 2021 2021 2021    SYSTOLIC 038 [phone]   DIASTOLIC 88 70 94 90 524 94   Pulse 62 - 67 67 86 97   Temp - - 97.6 97.2 98.3 97.8   Resp - - 16 16 16 16   SpO2 99 - 94 97 99 94   Weight 185 lb 184 lb - - - -   Height 5' 7\" - - - - -   Body mass index 28.97 kg/m2 - - - - -   Pain Level - - - 0 - -   Some recent data might be hidden       History reviewed. No pertinent family history.     Social History     Tobacco Use    Smoking status: Never Smoker    Smokeless tobacco: Never Used   Substance Use Topics    Alcohol use: Never      Current Outpatient Medications   Medication Sig Dispense Refill    Milk Thistle 1000 MG CAPS Take 1 capsule by mouth 2 times daily as needed      labetalol (NORMODYNE) 200 MG tablet TAKE 1 TABLET BY MOUTH EVERY DAY 90 tablet 1    levothyroxine (SYNTHROID) 25 MCG tablet Take 1 tablet by mouth daily 90 tablet 1    simvastatin (ZOCOR) 40 MG tablet TAKE 1 TABLET BY MOUTH EVERY DAY AT NIGHT 90 tablet 3    enalapril (VASOTEC) 10 MG tablet TAKE 1 TABLET BY MOUTH TWICE A  tablet 1    vitamin B-1 (THIAMINE) 100 MG tablet Take 1 tablet by mouth daily 30 tablet 0    folic acid (FOLVITE) 1 MG tablet Take 1 tablet by mouth daily 30 tablet 0    levETIRAcetam (KEPPRA) 500 MG tablet Take 500 mg by mouth 2 times daily      Multiple Vitamins-Minerals (THERAPEUTIC MULTIVITAMIN-MINERALS) tablet Take 1 tablet by mouth daily      fluticasone (FLONASE) 50 MCG/ACT nasal spray 50 sprays      montelukast (SINGULAIR) 10 MG tablet Take 10 mg by mouth daily (Patient not taking: Reported on 3/1/2022)       No current facility-administered medications for this visit.      Allergies   Allergen Reactions    Dilantin [Phenytoin]     Phenytoin      rash       Health Maintenance   Topic Date Due    Diabetes screen  Never done    Shingles Vaccine (1 of 2) 09/01/2022 (Originally 2/28/2018)    Pneumococcal 0-64 years Vaccine (1 of 2 - PPSV23) 09/20/2022 (Originally 2/28/1974)    Flu vaccine (1) 03/01/2023 (Originally 9/1/2021)    COVID-19 Vaccine (1) 03/14/2024 (Originally 2/28/1973)    Depression Screen  09/01/2022    Lipid screen  02/25/2023    TSH testing  02/25/2023    Potassium monitoring  02/25/2023    Creatinine monitoring  02/25/2023    DTaP/Tdap/Td vaccine (2 - Td or Tdap) 07/14/2027    Colorectal Cancer Screen  12/10/2030    Hepatitis C screen  Completed    HIV screen  Completed    Hepatitis A vaccine  Aged Out    Hepatitis B vaccine  Aged Out    Hib vaccine  Aged C/ Elia Sav 19 Meningococcal (ACWY) vaccine  Aged Out       No results found for: LABA1C  Lab Results   Component Value Date    PSA 0.44 02/25/2022    PSA 0.37 11/18/2020     TSH   Date Value Ref Range Status   02/25/2022 10.260 (H) 0.270 - 4.200 uIU/mL Final   ]  Lab Results   Component Value Date     02/25/2022    K 4.7 02/25/2022     02/25/2022    CO2 26 02/25/2022    BUN 16 02/25/2022    CREATININE 0.9 02/25/2022    GLUCOSE 103 02/25/2022    CALCIUM 9.6 02/25/2022    PROT 6.9 02/25/2022    LABALBU 4.5 02/25/2022    BILITOT 0.5 02/25/2022    ALKPHOS 110 02/25/2022    AST 53 (H) 02/25/2022    ALT 58 (H) 02/25/2022    LABGLOM >60 02/25/2022    GFRAA >59 02/25/2022     Lab Results   Component Value Date    CHOL 169 02/25/2022    CHOL 155 (L) 12/12/2018     Lab Results   Component Value Date    TRIG 78 02/25/2022    TRIG 76 12/12/2018     Lab Results   Component Value Date    HDL 56 02/25/2022    HDL 67 12/12/2018     Lab Results   Component Value Date    LDLCALC 97 02/25/2022    LDLCALC 73 12/12/2018     Lab Results   Component Value Date     02/25/2022    K 4.7 02/25/2022    K 3.3 08/26/2021     02/25/2022    CO2 26 02/25/2022    BUN 16 02/25/2022    CREATININE 0.9 02/25/2022    GLUCOSE 103 02/25/2022    CALCIUM 9.6 02/25/2022      Lab Results   Component Value Date    WBC 6.4 02/25/2022    HGB 15.8 02/25/2022    HCT 48.8 02/25/2022    .4 (H) 02/25/2022     02/25/2022    LYMPHOPCT 35.6 02/25/2022    RBC 4.86 02/25/2022    MCH 32.5 (H) 02/25/2022    MCHC 32.4 (L) 02/25/2022    RDW 13.1 02/25/2022     Lab Results   Component Value Date    VITD25 44.7 02/25/2022     Labs reviewed from 2/25/2022    Subjective:      Review of Systems   Constitutional: Negative for fatigue, fever and unexpected weight change. HENT: Negative for ear discharge, ear pain, mouth sores, sore throat and trouble swallowing. Eyes: Negative for discharge, itching and visual disturbance.    Respiratory: Negative for cough, choking, shortness of breath, wheezing and stridor. Cardiovascular: Negative for chest pain, palpitations and leg swelling. Gastrointestinal: Negative for abdominal distention, abdominal pain, blood in stool, constipation, diarrhea, nausea and vomiting. Endocrine: Negative for cold intolerance, polydipsia and polyuria. Genitourinary: Negative for difficulty urinating, dysuria, frequency and urgency. Musculoskeletal: Negative for arthralgias and gait problem. Skin: Negative for color change and rash. Allergic/Immunologic: Negative for food allergies and immunocompromised state. Neurological: Negative for dizziness, tremors, syncope, speech difficulty, weakness and headaches. Hematological: Negative for adenopathy. Does not bruise/bleed easily. Psychiatric/Behavioral: Negative for confusion and hallucinations. Objective:     Physical Exam  Constitutional:       General: He is not in acute distress. Appearance: He is well-developed. HENT:      Head: Normocephalic and atraumatic. Eyes:      General: No scleral icterus. Right eye: No discharge. Left eye: No discharge. Pupils: Pupils are equal, round, and reactive to light. Neck:      Thyroid: No thyromegaly. Vascular: No JVD. Cardiovascular:      Rate and Rhythm: Normal rate and regular rhythm. Heart sounds: Normal heart sounds. No murmur heard. Pulmonary:      Effort: Pulmonary effort is normal. No respiratory distress. Breath sounds: Normal breath sounds. No wheezing or rales. Abdominal:      General: Bowel sounds are normal. There is no distension. Palpations: Abdomen is soft. There is no mass. Tenderness: There is no abdominal tenderness. There is no guarding or rebound. Musculoskeletal:         General: No tenderness. Normal range of motion. Cervical back: Normal range of motion and neck supple. Skin:     General: Skin is warm and dry.       Findings: No erythema or rash. Neurological:      Mental Status: He is alert and oriented to person, place, and time. Cranial Nerves: No cranial nerve deficit. Coordination: Coordination normal.      Deep Tendon Reflexes: Reflexes are normal and symmetric. Reflexes normal.   Psychiatric:         Mood and Affect: Mood is not depressed. Behavior: Behavior normal.         Thought Content: Thought content normal.         Judgment: Judgment normal.       /88   Pulse 62   Ht 5' 7\" (1.702 m)   Wt 185 lb (83.9 kg)   SpO2 99%   BMI 28.98 kg/m²           Assessment:      Problem List     Cerebral artery occlusion with cerebral infarction (HCC)     Stable with no acute or new symptoms          Fatty liver     We will continue to monitor          Hypertension     Stable on current dose of enalapril 10 mg daily with carvedilol 1200 daily          Relevant Medications    enalapril (VASOTEC) 10 MG tablet    labetalol (NORMODYNE) 200 MG tablet    Hypothyroid - Primary     TSH is over 10 we will start with 5 mcg of Synthroid daily and recheck in 6 weeks          Relevant Medications    levothyroxine (SYNTHROID) 25 MCG tablet    Other Relevant Orders    TSH    Mixed hyperlipidemia     Stable with current simvastatin 40 mg daily          Relevant Medications    enalapril (VASOTEC) 10 MG tablet    simvastatin (ZOCOR) 40 MG tablet    labetalol (NORMODYNE) 200 MG tablet          Plan:        Patient given educational materials - see patient instructions. Discussed use, benefit, and side effects of prescribed medications. Allpatient questions answered. Pt voiced understanding. Reviewed health maintenance. Instructed to continue current medications, diet and exercise. Patient agreed with treatment plan. Follow up as directed.    MEDICATIONS:  Orders Placed This Encounter   Medications    labetalol (NORMODYNE) 200 MG tablet     Sig: TAKE 1 TABLET BY MOUTH EVERY DAY     Dispense:  90 tablet     Refill:  1    levothyroxine (SYNTHROID) 25 MCG tablet     Sig: Take 1 tablet by mouth daily     Dispense:  90 tablet     Refill:  1         ORDERS:  Orders Placed This Encounter   Procedures    TSH    CBC with Auto Differential    Comprehensive Metabolic Panel    Vitamin D 25 Hydroxy    TSH       Follow-up:  Return in about 3 months (around 6/1/2022) for have labs done prior to appt. PATIENT INSTRUCTIONS:  Patient Instructions   1. Hypertension The current medical regimen is effective;  continue present plan and medications. 2.  Hypothyroidism;  Start 25 mcg daily    Take on emptry stomach and wait at least an hour to eat or drink   We will recheck in 6 weeks and then again in 3 months  3. Hyperlipidemia stable with simvastatin no changes  Her for fatty liver stable no changes necessary  5. Old CVA stable no new symptoms      Electronically signed by MICHAEL Carvajal on 3/1/2022 at 4:04 PM    @On this date 3/1/2022 I have spent 25 minutes reviewing previous notes, test results and face to face with the patient discussing the diagnosis and importance of compliance with the treatment plan as well as documenting on the day of the visit. EMRDragon/transcription disclaimer:  Much of this encounter note is electronic transcription/translation of spoken language to printed texts. The electronic translation of spoken language may be erroneous, or at times,nonsensical words or phrases may be inadvertently transcribed.   Although I have reviewed the note for such errors, some may still exist.

## 2022-03-01 NOTE — PATIENT INSTRUCTIONS
1.  Hypertension The current medical regimen is effective;  continue present plan and medications. 2.  Hypothyroidism;  Start 25 mcg daily    Take on emptry stomach and wait at least an hour to eat or drink   We will recheck in 6 weeks and then again in 3 months  3. Hyperlipidemia stable with simvastatin no changes  Her for fatty liver stable no changes necessary  5.   Old CVA stable no new symptoms

## 2022-04-18 DIAGNOSIS — I10 ESSENTIAL HYPERTENSION: ICD-10-CM

## 2022-04-18 LAB — TSH SERPL DL<=0.05 MIU/L-ACNC: 5.39 UIU/ML (ref 0.27–4.2)

## 2022-04-29 DIAGNOSIS — I10 ESSENTIAL HYPERTENSION: ICD-10-CM

## 2022-05-02 RX ORDER — ENALAPRIL MALEATE 10 MG/1
TABLET ORAL
Qty: 180 TABLET | Refills: 1 | Status: SHIPPED | OUTPATIENT
Start: 2022-05-02

## 2022-05-02 NOTE — TELEPHONE ENCOUNTER
Kishore called requesting a refill of the below medication which has been pended for you:     Requested Prescriptions     Pending Prescriptions Disp Refills    enalapril (VASOTEC) 10 MG tablet [Pharmacy Med Name: ENALAPRIL MALEATE 10 MG TAB] 180 tablet 1     Sig: TAKE 1 TABLET BY MOUTH TWICE A DAY       Last Appointment Date: 3/1/2022  Next Appointment Date: 6/1/2022    Allergies   Allergen Reactions    Dilantin [Phenytoin]     Phenytoin      rash

## 2022-05-23 ENCOUNTER — TELEPHONE (OUTPATIENT)
Dept: INTERNAL MEDICINE | Age: 52
End: 2022-05-23

## 2022-05-23 DIAGNOSIS — E03.9 HYPOTHYROIDISM, UNSPECIFIED TYPE: Primary | ICD-10-CM

## 2022-05-23 NOTE — TELEPHONE ENCOUNTER
levothyroxine (SYNTHROID) 25 MCG tablet 03/01/22 -- MICHAEL Brock Take 1 tablet by mouth daily  Patient states he was told last month to take 2 tablets daily however he is now running out of medication, will not have enough to last til his appointment . Pharmacy said he would have to ask for the script to be rewritten to take 2 tablets instead of one daily.      Pharmacy is [facility]

## 2022-05-24 RX ORDER — LEVOTHYROXINE SODIUM 0.05 MG/1
50 TABLET ORAL DAILY
Qty: 90 TABLET | Refills: 1 | Status: SHIPPED | OUTPATIENT
Start: 2022-05-24

## 2022-05-31 DIAGNOSIS — I10 ESSENTIAL HYPERTENSION: ICD-10-CM

## 2022-05-31 DIAGNOSIS — E78.2 MIXED HYPERLIPIDEMIA: ICD-10-CM

## 2022-05-31 DIAGNOSIS — E78.2 MIXED HYPERLIPIDEMIA: Primary | ICD-10-CM

## 2022-05-31 DIAGNOSIS — E03.9 HYPOTHYROIDISM, UNSPECIFIED TYPE: ICD-10-CM

## 2022-05-31 DIAGNOSIS — E55.9 VITAMIN D DEFICIENCY: ICD-10-CM

## 2022-05-31 LAB
ALBUMIN SERPL-MCNC: 4.7 G/DL (ref 3.5–5.2)
ALP BLD-CCNC: 81 U/L (ref 40–130)
ALT SERPL-CCNC: 42 U/L (ref 5–41)
ANION GAP SERPL CALCULATED.3IONS-SCNC: 14 MMOL/L (ref 7–19)
AST SERPL-CCNC: 30 U/L (ref 5–40)
BASOPHILS ABSOLUTE: 0.1 K/UL (ref 0–0.2)
BASOPHILS RELATIVE PERCENT: 2 % (ref 0–1)
BILIRUB SERPL-MCNC: 0.6 MG/DL (ref 0.2–1.2)
BUN BLDV-MCNC: 19 MG/DL (ref 6–20)
CALCIUM SERPL-MCNC: 9.7 MG/DL (ref 8.6–10)
CHLORIDE BLD-SCNC: 106 MMOL/L (ref 98–111)
CHOLESTEROL, TOTAL: 182 MG/DL (ref 160–199)
CO2: 22 MMOL/L (ref 22–29)
CREAT SERPL-MCNC: 1 MG/DL (ref 0.5–1.2)
EOSINOPHILS ABSOLUTE: 0.6 K/UL (ref 0–0.6)
EOSINOPHILS RELATIVE PERCENT: 9.1 % (ref 0–5)
GFR AFRICAN AMERICAN: >59
GFR NON-AFRICAN AMERICAN: >60
GLUCOSE BLD-MCNC: 97 MG/DL (ref 74–109)
HCT VFR BLD CALC: 45.5 % (ref 42–52)
HDLC SERPL-MCNC: 55 MG/DL (ref 55–121)
HEMOGLOBIN: 15.1 G/DL (ref 14–18)
IMMATURE GRANULOCYTES #: 0 K/UL
LDL CHOLESTEROL CALCULATED: 105 MG/DL
LYMPHOCYTES ABSOLUTE: 2.1 K/UL (ref 1.1–4.5)
LYMPHOCYTES RELATIVE PERCENT: 32 % (ref 20–40)
MCH RBC QN AUTO: 32.3 PG (ref 27–31)
MCHC RBC AUTO-ENTMCNC: 33.2 G/DL (ref 33–37)
MCV RBC AUTO: 97.4 FL (ref 80–94)
MONOCYTES ABSOLUTE: 0.8 K/UL (ref 0–0.9)
MONOCYTES RELATIVE PERCENT: 11.5 % (ref 0–10)
NEUTROPHILS ABSOLUTE: 2.9 K/UL (ref 1.5–7.5)
NEUTROPHILS RELATIVE PERCENT: 45.1 % (ref 50–65)
PDW BLD-RTO: 13.6 % (ref 11.5–14.5)
PLATELET # BLD: 196 K/UL (ref 130–400)
PMV BLD AUTO: 10.4 FL (ref 9.4–12.4)
POTASSIUM SERPL-SCNC: 4.7 MMOL/L (ref 3.5–5)
RBC # BLD: 4.67 M/UL (ref 4.7–6.1)
SODIUM BLD-SCNC: 142 MMOL/L (ref 136–145)
TOTAL PROTEIN: 7 G/DL (ref 6.6–8.7)
TRIGL SERPL-MCNC: 112 MG/DL (ref 0–149)
TSH SERPL DL<=0.05 MIU/L-ACNC: 3.08 UIU/ML (ref 0.27–4.2)
VITAMIN D 25-HYDROXY: 36.4 NG/ML
WBC # BLD: 6.5 K/UL (ref 4.8–10.8)

## 2022-06-01 ENCOUNTER — OFFICE VISIT (OUTPATIENT)
Dept: INTERNAL MEDICINE | Age: 52
End: 2022-06-01
Payer: COMMERCIAL

## 2022-06-01 VITALS
WEIGHT: 185 LBS | DIASTOLIC BLOOD PRESSURE: 60 MMHG | BODY MASS INDEX: 26.48 KG/M2 | HEIGHT: 70 IN | HEART RATE: 60 BPM | OXYGEN SATURATION: 98 % | SYSTOLIC BLOOD PRESSURE: 108 MMHG

## 2022-06-01 DIAGNOSIS — E78.2 MIXED HYPERLIPIDEMIA: ICD-10-CM

## 2022-06-01 DIAGNOSIS — I10 PRIMARY HYPERTENSION: ICD-10-CM

## 2022-06-01 DIAGNOSIS — I63.50 CEREBRAL ARTERY OCCLUSION WITH CEREBRAL INFARCTION (HCC): Primary | ICD-10-CM

## 2022-06-01 DIAGNOSIS — E55.9 VITAMIN D DEFICIENCY: ICD-10-CM

## 2022-06-01 DIAGNOSIS — E03.9 HYPOTHYROIDISM, UNSPECIFIED TYPE: ICD-10-CM

## 2022-06-01 PROCEDURE — 99214 OFFICE O/P EST MOD 30 MIN: CPT | Performed by: NURSE PRACTITIONER

## 2022-06-01 ASSESSMENT — PATIENT HEALTH QUESTIONNAIRE - PHQ9
SUM OF ALL RESPONSES TO PHQ QUESTIONS 1-9: 0
SUM OF ALL RESPONSES TO PHQ9 QUESTIONS 1 & 2: 0
SUM OF ALL RESPONSES TO PHQ QUESTIONS 1-9: 0
2. FEELING DOWN, DEPRESSED OR HOPELESS: 0
1. LITTLE INTEREST OR PLEASURE IN DOING THINGS: 0

## 2022-06-01 ASSESSMENT — ENCOUNTER SYMPTOMS
CONSTIPATION: 0
WHEEZING: 0
NAUSEA: 0
COLOR CHANGE: 0
SORE THROAT: 0
EYE ITCHING: 0
EYE DISCHARGE: 0
SHORTNESS OF BREATH: 0
STRIDOR: 0
VOMITING: 0
ABDOMINAL DISTENTION: 0
BLOOD IN STOOL: 0
DIARRHEA: 0
ABDOMINAL PAIN: 0
COUGH: 0
CHOKING: 0
TROUBLE SWALLOWING: 0

## 2022-06-01 NOTE — PROGRESS NOTES
[facility] PHYSICIAN SERVICES  [facility] INTERNAL MEDICINE  [address] [facility] [address] [address]  Dept: [phone]  Dept Fax: [phone]: [phone]    Pepper Johnston (:  1970) is a 45 y.o. male,Established patient  with green  , here for evaluation of the following chief complaint(s): 3 Month Follow-Up      Pepper Johnston is a 45 y.o. male who presents today for his medical conditions/complaints as noted below. Pepper Johnston is c/bradley 3 Month Follow-Up        HPI:     Chief Complaint   Patient presents with    3 Month Follow-Up     HPI   1. Hypertension   He is taking enalapril 10 bid  And labetalol 200 daily     2. Old CVA  But Is stable   3. Hypothyroidism is stable with 50 mcg daily level is back to normal he had been 5.393.0  4. Hyperlipidemia; stable with zocor 40 daily cholesterol is 182 triglycerides are 112        Past Medical History:   Diagnosis Date    Asthma     Cerebral artery occlusion with cerebral infarction ([address]     Fatty liver     Hemorrhage, intracerebral (HCC)     history of hemorrhage to the brain, resolved. right hemiparesis.  Hypertension     Mixed hyperlipidemia     Stroke [facility])       Past Surgical History:   Procedure Laterality Date    CHOLECYSTECTOMY      with cholangiography  Dr FELIPE Dickerson Sessions 2022 3/1/2022 2021 2021 2021    SYSTOLIC 101 [phone]   DIASTOLIC 60 88 70 94 [phone]   Pulse 60 62 - 67 67 86   Temp - - - 97.6 97.2 98.3   Resp - - - 16 16 16   SpO2 98 99 - 94 97 99   Weight 185 lb 185 lb 184 lb - - -   Height 5' 10\" 5' 7\" - - - -   Body mass index 26.54 kg/m2 28.97 kg/m2 - - - -   Pain Level - - - - 0 -   Some recent data might be hidden       No family history on file.     Social History     Tobacco Use    Smoking status: Never Smoker    Smokeless tobacco: Never Used   Substance Use Topics    Alcohol use: Never      Current Outpatient Medications   Medication Sig Dispense Refill    levothyroxine (SYNTHROID) 50 MCG tablet Take 1 tablet by mouth daily 90 tablet 1    enalapril (VASOTEC) 10 MG tablet TAKE 1 TABLET BY MOUTH TWICE A  tablet 1    Milk Thistle 1000 MG CAPS Take 1 capsule by mouth 2 times daily as needed      labetalol (NORMODYNE) 200 MG tablet TAKE 1 TABLET BY MOUTH EVERY DAY 90 tablet 1    simvastatin (ZOCOR) 40 MG tablet TAKE 1 TABLET BY MOUTH EVERY DAY AT NIGHT 90 tablet 3    vitamin B-1 (THIAMINE) 100 MG tablet Take 1 tablet by mouth daily 30 tablet 0    levETIRAcetam (KEPPRA) 500 MG tablet Take 500 mg by mouth 2 times daily      Multiple Vitamins-Minerals (THERAPEUTIC MULTIVITAMIN-MINERALS) tablet Take 1 tablet by mouth daily      fluticasone (FLONASE) 50 MCG/ACT nasal spray 50 sprays       No current facility-administered medications for this visit.      Allergies   Allergen Reactions    Dilantin [Phenytoin]     Phenytoin      rash       Health Maintenance   Topic Date Due    Shingles vaccine (1 of 2) 09/01/2022 (Originally 2/28/2018)    Pneumococcal 0-64 years Vaccine (1 - PCV) 09/20/2022 (Originally 2/28/1974)    Flu vaccine (Season Ended) 03/01/2023 (Originally 9/1/2022)    COVID-19 Vaccine (1) 03/14/2024 (Originally 2/28/1973)    Lipids  05/31/2023    Depression Screen  06/01/2023    DTaP/Tdap/Td vaccine (2 - Td or Tdap) 07/14/2027    Colorectal Cancer Screen  12/10/2030    Hepatitis C screen  Completed    HIV screen  Completed    Hepatitis A vaccine  Aged Out    Hepatitis B vaccine  Aged Out    Hib vaccine  Aged Out    Meningococcal (ACWY) vaccine  Aged Out       No results found for: LABA1C  Lab Results   Component Value Date    PSA 0.44 02/25/2022    PSA 0.37 11/18/2020     TSH   Date Value Ref Range Status   05/31/2022 3.080 0.270 - 4.200 uIU/mL Final   ]  Lab Results   Component Value Date     05/31/2022    K 4.7 05/31/2022     05/31/2022    CO2 22 05/31/2022    BUN 19 05/31/2022    CREATININE 1.0 05/31/2022    GLUCOSE 97 05/31/2022    CALCIUM 9.7 05/31/2022    PROT 7.0 05/31/2022    LABALBU 4.7 05/31/2022    BILITOT 0.6 05/31/2022    ALKPHOS 81 05/31/2022    AST 30 05/31/2022    ALT 42 (H) 05/31/2022    LABGLOM >60 05/31/2022    GFRAA >59 05/31/2022     Lab Results   Component Value Date    CHOL 182 05/31/2022    CHOL 169 02/25/2022    CHOL 155 (L) 12/12/2018     Lab Results   Component Value Date    TRIG 112 05/31/2022    TRIG 78 02/25/2022    TRIG 76 12/12/2018     Lab Results   Component Value Date    HDL 55 05/31/2022    HDL 56 02/25/2022    HDL 67 12/12/2018     Lab Results   Component Value Date    LDLCALC 105 05/31/2022    LDLCALC 97 02/25/2022    LDLCALC 73 12/12/2018     Lab Results   Component Value Date     05/31/2022    K 4.7 05/31/2022    K 3.3 08/26/2021     05/31/2022    CO2 22 05/31/2022    BUN 19 05/31/2022    CREATININE 1.0 05/31/2022    GLUCOSE 97 05/31/2022    CALCIUM 9.7 05/31/2022      Lab Results   Component Value Date    WBC 6.5 05/31/2022    HGB 15.1 05/31/2022    HCT 45.5 05/31/2022    MCV 97.4 (H) 05/31/2022     05/31/2022    LYMPHOPCT 32.0 05/31/2022    RBC 4.67 (L) 05/31/2022    MCH 32.3 (H) 05/31/2022    MCHC 33.2 05/31/2022    RDW 13.6 05/31/2022     Lab Results   Component Value Date    VITD25 36.4 05/31/2022     Labs reviewed from 5/31/2022    Subjective:      Review of Systems   Constitutional: Negative for fatigue, fever and unexpected weight change. HENT: Negative for ear discharge, ear pain, mouth sores, sore throat and trouble swallowing. Eyes: Negative for discharge, itching and visual disturbance. Respiratory: Negative for cough, choking, shortness of breath, wheezing and stridor. Cardiovascular: Negative for chest pain, palpitations and leg swelling. Gastrointestinal: Negative for abdominal distention, abdominal pain, blood in stool, constipation, diarrhea, nausea and vomiting.    Endocrine: Negative for cold intolerance, polydipsia and polyuria. Genitourinary: Negative for difficulty urinating, dysuria, frequency and urgency. Musculoskeletal: Negative for arthralgias and gait problem. Skin: Negative for color change and rash. Allergic/Immunologic: Negative for food allergies and immunocompromised state. Neurological: Negative for dizziness, tremors, syncope, speech difficulty, weakness and headaches. Hematological: Negative for adenopathy. Does not bruise/bleed easily. Psychiatric/Behavioral: Negative for confusion and hallucinations. Objective:     Physical Exam  Constitutional:       General: He is not in acute distress. Appearance: He is well-developed. HENT:      Head: Normocephalic and atraumatic. Eyes:      General: No scleral icterus. Right eye: No discharge. Left eye: No discharge. Pupils: Pupils are equal, round, and reactive to light. Neck:      Thyroid: No thyromegaly. Vascular: No JVD. Cardiovascular:      Rate and Rhythm: Normal rate and regular rhythm. Heart sounds: Normal heart sounds. No murmur heard. Pulmonary:      Effort: Pulmonary effort is normal. No respiratory distress. Breath sounds: Normal breath sounds. No wheezing or rales. Abdominal:      General: Bowel sounds are normal. There is no distension. Palpations: Abdomen is soft. There is no mass. Tenderness: There is no abdominal tenderness. There is no guarding or rebound. Musculoskeletal:         General: No tenderness. Normal range of motion. Cervical back: Normal range of motion and neck supple. Skin:     General: Skin is warm and dry. Findings: No erythema or rash. Neurological:      Mental Status: He is alert and oriented to person, place, and time. Cranial Nerves: No cranial nerve deficit. Coordination: Coordination normal.      Deep Tendon Reflexes: Reflexes are normal and symmetric.  Reflexes normal.   Psychiatric:         Mood and Affect: Mood is not depressed. Behavior: Behavior normal.         Thought Content: Thought content normal.         Judgment: Judgment normal.       /60   Pulse 60   Ht 5' 10\" (1.778 m)   Wt 185 lb (83.9 kg)   SpO2 98%   BMI 26.54 kg/m²           Assessment:      Problem List     Cerebral artery occlusion with cerebral infarction ([facility] [address] - Primary     Has been stable no recent new symptoms          Hypertension     He is stable with enalapril 10 twice daily and labetalol 20          Relevant Medications    labetalol (NORMODYNE) 200 MG tablet    enalapril (VASOTEC) 10 MG tablet    Other Relevant Orders    CBC with Auto Differential    Comprehensive Metabolic Panel    Hypothyroid     He is stable with 50 mcg daily of levothyroxine          Relevant Medications    levothyroxine (SYNTHROID) 50 MCG tablet    Mixed hyperlipidemia     Stable with current dose of Zocor 40          Relevant Medications    simvastatin (ZOCOR) 40 MG tablet    labetalol (NORMODYNE) 200 MG tablet    enalapril (VASOTEC) 10 MG tablet    Other Relevant Orders    Cholesterol, Total          Plan:        Patient given educational materials - see patient instructions. Discussed use, benefit, and side effects of prescribed medications. Allpatient questions answered. Pt voiced understanding. Reviewed health maintenance. Instructed to continue current medications, diet and exercise. Patient agreed with treatment plan. Follow up as directed. MEDICATIONS:  No orders of the defined types were placed in this encounter. ORDERS:  Orders Placed This Encounter   Procedures    CBC with Auto Differential    Comprehensive Metabolic Panel    Cholesterol, Total    Vitamin D 25 Hydroxy       Follow-up:  Return in about 3 months (around 9/1/2022). PATIENT INSTRUCTIONS:  Patient Instructions   1. Hypertension The current medical regimen is effective;  continue present plan and medications. 2. Old CVA;  Stable no change  3. Hypothyroidism;   The current medical regimen is effective;  continue present plan and medications. 4.  Hyperlipidemia stable with zocor no changes      Electronically signed by MICHAEL Pulido on 6/1/2022 at 1:53 PM        EMRDragon/transcription disclaimer:  Much of this encounter note is electronic transcription/translation of spoken language to printed texts. The electronic translation of spoken language may be erroneous, or at times,nonsensical words or phrases may be inadvertently transcribed.   Although I have reviewed the note for such errors, some may still exist.

## 2022-06-01 NOTE — PATIENT INSTRUCTIONS
1.  Hypertension The current medical regimen is effective;  continue present plan and medications. 2. Old CVA;  Stable no change  3. Hypothyroidism; The current medical regimen is effective;  continue present plan and medications.   4.  Hyperlipidemia stable with zocor no changes

## 2022-07-09 DIAGNOSIS — I10 ESSENTIAL HYPERTENSION: ICD-10-CM

## 2022-07-11 RX ORDER — LABETALOL 200 MG/1
TABLET, FILM COATED ORAL
Qty: 90 TABLET | Refills: 1 | Status: SHIPPED | OUTPATIENT
Start: 2022-07-11

## 2022-08-17 RX ORDER — LEVOTHYROXINE SODIUM 0.03 MG/1
TABLET ORAL
Qty: 90 TABLET | Refills: 1 | Status: SHIPPED | OUTPATIENT
Start: 2022-08-17

## 2022-08-25 ENCOUNTER — TELEPHONE (OUTPATIENT)
Dept: NEUROLOGY | Facility: CLINIC | Age: 52
End: 2022-08-25

## 2022-08-25 ENCOUNTER — DOCUMENTATION (OUTPATIENT)
Dept: NEUROLOGY | Facility: CLINIC | Age: 52
End: 2022-08-25

## 2022-08-25 DIAGNOSIS — R56.9 GENERALIZED CONVULSIVE SEIZURES: Primary | ICD-10-CM

## 2022-08-25 NOTE — PROGRESS NOTES
Patient calls asking to get an order for a Keppra level as he is going to get some blood work done per his primary care doctor and that will be done before his appointment with .  He is asking that I mail that order to him.

## 2022-09-02 NOTE — TELEPHONE ENCOUNTER
I did mail the order to the patient on Aug. 25th.  I did leave him a message telling him this.  I did tell him if he wants to stop by the office I will print another lab order for him.

## 2022-09-02 NOTE — TELEPHONE ENCOUNTER
Provider: SUSHMA RUBI MD    Caller: MOISES    Relationship to Patient: SELF    Phone Number: [phone]    Reason for Call: PATIENT IS CALLING TO SEE IF THE LAB ORDER WAS MAILED TO HIM, PER 8-25-22 DOCUMENTATION NOTE.   TRIED TO CALL CLINIC TO CHECK, NO ANSWER.  PT WILL BE IN [place] TODAY AND CAN STOP BY AND  THE LETTER, IF IT IS AVAILABLE.      PLEASE CALL AND ADVISE

## 2022-09-02 NOTE — TELEPHONE ENCOUNTER
PATIENT CALLED TO TELL DAVID THAT HE CHECKED HIS MAILBOX AND DID RECEIVE THE ORDER.  HE APPRECIATED IT.

## 2022-09-06 DIAGNOSIS — I10 PRIMARY HYPERTENSION: ICD-10-CM

## 2022-09-06 DIAGNOSIS — E78.2 MIXED HYPERLIPIDEMIA: ICD-10-CM

## 2022-09-06 DIAGNOSIS — E55.9 VITAMIN D DEFICIENCY: ICD-10-CM

## 2022-09-06 LAB
ALBUMIN SERPL-MCNC: 4.6 G/DL (ref 3.5–5.2)
ALP BLD-CCNC: 117 U/L (ref 40–130)
ALT SERPL-CCNC: 295 U/L (ref 5–41)
ANION GAP SERPL CALCULATED.3IONS-SCNC: 11 MMOL/L (ref 7–19)
AST SERPL-CCNC: 312 U/L (ref 5–40)
BASOPHILS ABSOLUTE: 0.1 K/UL (ref 0–0.2)
BASOPHILS RELATIVE PERCENT: 1.6 % (ref 0–1)
BILIRUB SERPL-MCNC: 0.5 MG/DL (ref 0.2–1.2)
BUN BLDV-MCNC: 14 MG/DL (ref 6–20)
CALCIUM SERPL-MCNC: 9.4 MG/DL (ref 8.6–10)
CHLORIDE BLD-SCNC: 107 MMOL/L (ref 98–111)
CHOLESTEROL, TOTAL: 162 MG/DL (ref 160–199)
CO2: 23 MMOL/L (ref 22–29)
CREAT SERPL-MCNC: 0.8 MG/DL (ref 0.5–1.2)
EOSINOPHILS ABSOLUTE: 0.4 K/UL (ref 0–0.6)
EOSINOPHILS RELATIVE PERCENT: 6.7 % (ref 0–5)
GFR AFRICAN AMERICAN: >59
GFR NON-AFRICAN AMERICAN: >60
GLUCOSE BLD-MCNC: 92 MG/DL (ref 74–109)
HCT VFR BLD CALC: 38.8 % (ref 42–52)
HEMOGLOBIN: 12.7 G/DL (ref 14–18)
IMMATURE GRANULOCYTES #: 0 K/UL
LYMPHOCYTES ABSOLUTE: 2.1 K/UL (ref 1.1–4.5)
LYMPHOCYTES RELATIVE PERCENT: 36.5 % (ref 20–40)
MCH RBC QN AUTO: 33 PG (ref 27–31)
MCHC RBC AUTO-ENTMCNC: 32.7 G/DL (ref 33–37)
MCV RBC AUTO: 100.8 FL (ref 80–94)
MONOCYTES ABSOLUTE: 0.6 K/UL (ref 0–0.9)
MONOCYTES RELATIVE PERCENT: 10.8 % (ref 0–10)
NEUTROPHILS ABSOLUTE: 2.5 K/UL (ref 1.5–7.5)
NEUTROPHILS RELATIVE PERCENT: 44 % (ref 50–65)
PDW BLD-RTO: 14.9 % (ref 11.5–14.5)
PLATELET # BLD: 103 K/UL (ref 130–400)
PMV BLD AUTO: 11.9 FL (ref 9.4–12.4)
POTASSIUM SERPL-SCNC: 3.9 MMOL/L (ref 3.5–5)
RBC # BLD: 3.85 M/UL (ref 4.7–6.1)
SODIUM BLD-SCNC: 141 MMOL/L (ref 136–145)
TOTAL PROTEIN: 6.7 G/DL (ref 6.6–8.7)
VITAMIN D 25-HYDROXY: 44.6 NG/ML
WBC # BLD: 5.7 K/UL (ref 4.8–10.8)

## 2022-09-08 ENCOUNTER — OFFICE VISIT (OUTPATIENT)
Dept: INTERNAL MEDICINE | Age: 52
End: 2022-09-08
Payer: COMMERCIAL

## 2022-09-08 VITALS
HEART RATE: 60 BPM | OXYGEN SATURATION: 98 % | BODY MASS INDEX: 24.91 KG/M2 | DIASTOLIC BLOOD PRESSURE: 80 MMHG | SYSTOLIC BLOOD PRESSURE: 138 MMHG | HEIGHT: 70 IN | WEIGHT: 174 LBS

## 2022-09-08 DIAGNOSIS — E78.2 MIXED HYPERLIPIDEMIA: ICD-10-CM

## 2022-09-08 DIAGNOSIS — I63.50 CEREBRAL ARTERY OCCLUSION WITH CEREBRAL INFARCTION (HCC): ICD-10-CM

## 2022-09-08 DIAGNOSIS — E03.9 HYPOTHYROIDISM, UNSPECIFIED TYPE: ICD-10-CM

## 2022-09-08 DIAGNOSIS — K76.0 FATTY LIVER: Primary | ICD-10-CM

## 2022-09-08 DIAGNOSIS — R79.89 ELEVATED LIVER FUNCTION TESTS: ICD-10-CM

## 2022-09-08 DIAGNOSIS — I10 PRIMARY HYPERTENSION: ICD-10-CM

## 2022-09-08 LAB — KEPPRA: 8 UG/ML (ref 10–40)

## 2022-09-08 PROCEDURE — 99213 OFFICE O/P EST LOW 20 MIN: CPT | Performed by: NURSE PRACTITIONER

## 2022-09-08 SDOH — ECONOMIC STABILITY: FOOD INSECURITY: WITHIN THE PAST 12 MONTHS, YOU WORRIED THAT YOUR FOOD WOULD RUN OUT BEFORE YOU GOT MONEY TO BUY MORE.: NEVER TRUE

## 2022-09-08 SDOH — ECONOMIC STABILITY: FOOD INSECURITY: WITHIN THE PAST 12 MONTHS, THE FOOD YOU BOUGHT JUST DIDN'T LAST AND YOU DIDN'T HAVE MONEY TO GET MORE.: NEVER TRUE

## 2022-09-08 ASSESSMENT — ENCOUNTER SYMPTOMS
EYE DISCHARGE: 0
ABDOMINAL DISTENTION: 0
TROUBLE SWALLOWING: 0
CHOKING: 0
COLOR CHANGE: 0
STRIDOR: 0
BLOOD IN STOOL: 0
NAUSEA: 0
SHORTNESS OF BREATH: 0
ABDOMINAL PAIN: 0
COUGH: 0
DIARRHEA: 0
EYE ITCHING: 0
VOMITING: 0
SORE THROAT: 0
WHEEZING: 0
CONSTIPATION: 0

## 2022-09-08 ASSESSMENT — SOCIAL DETERMINANTS OF HEALTH (SDOH): HOW HARD IS IT FOR YOU TO PAY FOR THE VERY BASICS LIKE FOOD, HOUSING, MEDICAL CARE, AND HEATING?: NOT HARD AT ALL

## 2022-09-08 NOTE — PROGRESS NOTES
[facility] PHYSICIAN SERVICES  [facility] INTERNAL MEDICINE  [address] [address]  [facility] [place] [address]  Dept: [phone]  Dept Fax: [phone]: [phone]    Eren Corrales (:  1970) is a 45 y.o. male,Established patient  with green, here for evaluation of the following chief complaint(s): 3 Month Follow-Up      Eren Corrales is a 45 y.o. male who presents today for his medical conditions/complaints as noted below. Eren Corrales is c/bradley 3 Month Follow-Up        HPI:     Chief Complaint   Patient presents with    3 Month Follow-Up     HPI    Elevated LFT' s  he has fatty liver; he has been taking milk thistle;  he was taking over 8,000 mg a day; the daily recommended 420-1000 a day;   he realizes he messed up and stopped all his supplement ; he actually said this I thought if 1 was good more would be better. Since there is thankfully he is stopped all of those supplements. Aunt and mom  and a cousin polyorgan failure both felt to be resultant of overuse of herbal medications  Phyllis in   3 hypertension stable with Vasotec 10 and labetalol 200  4. Hypothyroidism stable on current dose of levothyroxine 25 mcg daily  5. Hyperlipidemia stable on current dose of Zocor 40 mg at night  #6 old CVA he is stable with Keppra  Past Medical History:   Diagnosis Date    Asthma     Cerebral artery occlusion with cerebral infarction ([address]     Fatty liver     Hemorrhage, intracerebral ([address]     history of hemorrhage to the brain, resolved. right hemiparesis.      Hypertension     Mixed hyperlipidemia     Stroke [facility])       Past Surgical History:   Procedure Laterality Date    CHOLECYSTECTOMY      with cholangiography  Dr FELIPE Brock 2022 2022 3/1/2022 2021 2021    SYSTOLIC 182 398 142 [phone]   DIASTOLIC 80 60 88 70 94 90   Pulse 60 60 62 - 67 67   Temp - - - - 97.6 97.2   Resp - - - - 16 16   SpO2 98 98 99 - 94 97   Weight 174 lb 185 lb 185 lb 184 lb - -   Height 5' 10\" 5' 10\" 5' 7\" - - -   Body mass index 24.96 kg/m2 26.54 kg/m2 28.97 kg/m2 - - -   Pain Level - - - - - 0   Some recent data might be hidden       No family history on file. Social History     Tobacco Use    Smoking status: Never    Smokeless tobacco: Never   Substance Use Topics    Alcohol use: Never      Current Outpatient Medications   Medication Sig Dispense Refill    levothyroxine (SYNTHROID) 25 MCG tablet TAKE 1 TABLET BY MOUTH EVERY DAY 90 tablet 1    labetalol (NORMODYNE) 200 MG tablet TAKE 1 TABLET BY MOUTH EVERY DAY 90 tablet 1    levothyroxine (SYNTHROID) 50 MCG tablet Take 1 tablet by mouth daily 90 tablet 1    enalapril (VASOTEC) 10 MG tablet TAKE 1 TABLET BY MOUTH TWICE A  tablet 1    simvastatin (ZOCOR) 40 MG tablet TAKE 1 TABLET BY MOUTH EVERY DAY AT NIGHT 90 tablet 3    vitamin B-1 (THIAMINE) 100 MG tablet Take 1 tablet by mouth daily 30 tablet 0    levETIRAcetam (KEPPRA) 500 MG tablet Take 500 mg by mouth 2 times daily      Multiple Vitamins-Minerals (THERAPEUTIC MULTIVITAMIN-MINERALS) tablet Take 1 tablet by mouth daily      fluticasone (FLONASE) 50 MCG/ACT nasal spray 50 sprays       No current facility-administered medications for this visit.      Allergies   Allergen Reactions    Dilantin [Phenytoin]     Phenytoin      rash       Health Maintenance   Topic Date Due    Shingles vaccine (1 of 2) Never done    Flu vaccine (1) 09/01/2022    Pneumococcal 0-64 years Vaccine (1 - PCV) 09/20/2022 (Originally 2/28/1974)    COVID-19 Vaccine (1) 03/14/2024 (Originally 1970)    Depression Screen  06/01/2023    Lipids  09/06/2023    DTaP/Tdap/Td vaccine (2 - Td or Tdap) 07/14/2027    Colorectal Cancer Screen  12/10/2030    Hepatitis C screen  Completed    HIV screen  Completed    Hepatitis A vaccine  Aged Out    Hepatitis B vaccine  Aged Out    Hib vaccine  Aged Out    Meningococcal (ACWY) vaccine  Aged Out       No results found for: [employer] Component Value Date    PSA 0.44 02/25/2022    PSA 0.37 11/18/2020     TSH   Date Value Ref Range Status   05/31/2022 3.080 0.270 - 4.200 uIU/mL Final   ]  Lab Results   Component Value Date     09/06/2022    K 3.9 09/06/2022     09/06/2022    CO2 23 09/06/2022    BUN 14 09/06/2022    CREATININE 0.8 09/06/2022    GLUCOSE 92 09/06/2022    CALCIUM 9.4 09/06/2022    PROT 6.7 09/06/2022    LABALBU 4.6 09/06/2022    BILITOT 0.5 09/06/2022    ALKPHOS 117 09/06/2022     (H) 09/06/2022     (H) 09/06/2022    LABGLOM >60 09/06/2022    GFRAA >59 09/06/2022     Lab Results   Component Value Date    CHOL 162 09/06/2022    CHOL 182 05/31/2022    CHOL 169 02/25/2022     Lab Results   Component Value Date    TRIG 112 05/31/2022    TRIG 78 02/25/2022    TRIG 76 12/12/2018     Lab Results   Component Value Date    HDL 55 05/31/2022    HDL 56 02/25/2022    HDL 67 12/12/2018     Lab Results   Component Value Date    LDLCALC 105 05/31/2022    LDLCALC 97 02/25/2022    LDLCALC 73 12/12/2018     Lab Results   Component Value Date/Time     09/06/2022 09:42 AM    K 3.9 09/06/2022 09:42 AM    K 3.3 08/26/2021 04:17 AM     09/06/2022 09:42 AM    CO2 23 09/06/2022 09:42 AM    BUN 14 09/06/2022 09:42 AM    CREATININE 0.8 09/06/2022 09:42 AM    GLUCOSE 92 09/06/2022 09:42 AM    CALCIUM 9.4 09/06/2022 09:42 AM      Lab Results   Component Value Date    WBC 5.7 09/06/2022    HGB 12.7 (L) 09/06/2022    HCT 38.8 (L) 09/06/2022    .8 (H) 09/06/2022     (L) 09/06/2022    LYMPHOPCT 36.5 09/06/2022    RBC 3.85 (L) 09/06/2022    MCH 33.0 (H) 09/06/2022    MCHC 32.7 (L) 09/06/2022    RDW 14.9 (H) 09/06/2022     Lab Results   Component Value Date    VITD25 44.6 09/06/2022     Labs reviewed from 1*7*0109    Subjective:      Review of Systems   Constitutional:  Negative for fatigue, fever and unexpected weight change.    HENT:  Negative for ear discharge, ear pain, mouth sores, sore throat and trouble swallowing. Eyes:  Negative for discharge, itching and visual disturbance. Respiratory:  Negative for cough, choking, shortness of breath, wheezing and stridor. Cardiovascular:  Negative for chest pain, palpitations and leg swelling. Gastrointestinal:  Negative for abdominal distention, abdominal pain, blood in stool, constipation, diarrhea, nausea and vomiting. Endocrine: Negative for cold intolerance, polydipsia and polyuria. Genitourinary:  Negative for difficulty urinating, dysuria, frequency and urgency. Musculoskeletal:  Negative for arthralgias and gait problem. Skin:  Negative for color change and rash. Allergic/Immunologic: Negative for food allergies and immunocompromised state. Neurological:  Negative for dizziness, tremors, syncope, speech difficulty, weakness and headaches. Hematological:  Negative for adenopathy. Does not bruise/bleed easily. Psychiatric/Behavioral:  Negative for confusion and hallucinations. Objective:     Physical Exam  Constitutional:       General: He is not in acute distress. Appearance: He is well-developed. HENT:      Head: Normocephalic and atraumatic. Eyes:      General: No scleral icterus. Right eye: No discharge. Left eye: No discharge. Pupils: Pupils are equal, round, and reactive to light. Neck:      Thyroid: No thyromegaly. Vascular: No JVD. Cardiovascular:      Rate and Rhythm: Normal rate and regular rhythm. Heart sounds: Normal heart sounds. No murmur heard. Pulmonary:      Effort: Pulmonary effort is normal. No respiratory distress. Breath sounds: Normal breath sounds. No wheezing or rales. Abdominal:      General: Bowel sounds are normal. There is no distension. Palpations: Abdomen is soft. There is no mass. Tenderness: There is no abdominal tenderness. There is no guarding or rebound. Musculoskeletal:         General: No tenderness. Normal range of motion.       Cervical back: Normal range of motion and neck supple. Skin:     General: Skin is warm and dry. Findings: No erythema or rash. Neurological:      Mental Status: He is alert and oriented to person, place, and time. Cranial Nerves: No cranial nerve deficit. Coordination: Coordination normal.      Deep Tendon Reflexes: Reflexes are normal and symmetric. Reflexes normal.   Psychiatric:         Mood and Affect: Mood is not depressed. Behavior: Behavior normal.         Thought Content: Thought content normal.         Judgment: Judgment normal.     /80   Pulse 60   Ht 5' 10\" (1.778 m)   Wt 174 lb (78.9 kg)   SpO2 98%   BMI 24.97 kg/m²           Assessment:      Problem List       Cerebral artery occlusion with cerebral infarction ([facility] [address]     Stable followed by neurology on [facility]          Elevated liver function tests     This is most likely from overuse of milk thistle. We will monitor this and see if it comes back down. Relevant Orders    Comprehensive Metabolic Panel    Fatty liver - Primary    Relevant Orders    Comprehensive Metabolic Panel    Hypertension     Stable with current dose of Vasotec 10 and labetalol 200          Relevant Medications    enalapril (VASOTEC) 10 MG tablet    labetalol (NORMODYNE) 200 MG tablet    Hypothyroid     Stable with current dose of levothyroxine 25 mcg daily          Relevant Medications    levothyroxine (SYNTHROID) 50 MCG tablet    levothyroxine (SYNTHROID) 25 MCG tablet    Mixed hyperlipidemia     Stable with current dose of Zocor 40 daily          Relevant Medications    simvastatin (ZOCOR) 40 MG tablet    enalapril (VASOTEC) 10 MG tablet    labetalol (NORMODYNE) 200 MG tablet       Plan:        Patient given educational materials - see patient instructions. Discussed use, benefit, and side effects of prescribed medications. Allpatient questions answered. Pt voiced understanding. Reviewed health maintenance.   Instructed to continue current medications, diet and exercise. Patient agreed with treatment plan. Follow up as directed. MEDICATIONS:  No orders of the defined types were placed in this encounter. ORDERS:  Orders Placed This Encounter   Procedures    Comprehensive Metabolic Panel       Follow-up:  Return in about 4 weeks (around 10/6/2022) for have labs done prior to appt. PATIENT INSTRUCTIONS:  #1 fatty liver with elevated LFTs we will repeat those in 3 to 4 weeks stopping the milk thistle  2.   primary hypertension stable with current meds no changes  3. Hypothyroidism stable on current dose no changes are necessary  4. Mixed hyperlipidemia stable on current dose no changes are necessary  6. Cerebral infarct old on Keppra for seizure control. Patient Instructions   1 fatty liver with elevated LFTs we will repeat those in 3 to 4 weeks stopping the milk thistle  2.   primary hypertension stable with current meds no changes  3. Hypothyroidism stable on current dose no changes are necessary  4. Mixed hyperlipidemia stable on current dose no changes are necessary  6. Cerebral infarct old on Keppra for seizure control. Electronically signed by MICHAEL Saldivar on 9/8/2022 at 5:23 PM    @    Ayan/transcription disclaimer:  Much of this encounter note is electronic transcription/translation of spoken language to printed texts. The electronic translation of spoken language may be erroneous, or at times,nonsensical words or phrases may be inadvertently transcribed.   Although I have reviewed the note for such errors, some may still exist.

## 2022-09-08 NOTE — ASSESSMENT & PLAN NOTE
This is most likely from overuse of milk thistle. We will monitor this and see if it comes back down.

## 2022-09-08 NOTE — TELEPHONE ENCOUNTER
Caller: PATIENT  Relationship to Patient: SELF  Phone Number: [phone]  Reason for Call: PATIENT CALLED BACK TO TELL DAVID THAT HE REACHED OUT TO YOU [facility] AND SPOKE WITH [facility] WHO TOLD HIM THEY WOULD BE FAXING THE LAB RESULTS TO THE OFFICE THIS AFTERNOON OR TOMORROW MORNING.

## 2022-09-08 NOTE — TELEPHONE ENCOUNTER
Caller: Nathaniel Troy    Relationship: Self    Best call back number: [phone]    Who are you requesting to speak with (clinical staff, provider,  specific staff member):   DAVID    What was the call regarding:   PT CALLED TO SPEAK W/DAVID. UNABLE TO WARM TRANSFER CALL TO OFFICE.  PT HAD LABS DONE 9-6-22 AT [facility]. PT HAS APPT TOMORROW. WANTS TO MAKE SURE DAVID HAD ACCESS TO THE LABS.    Do you require a callback:   YES, PLEASE.

## 2022-09-08 NOTE — PATIENT INSTRUCTIONS
1 fatty liver with elevated LFTs we will repeat those in 3 to 4 weeks stopping the milk thistle  2.   primary hypertension stable with current meds no changes  3. Hypothyroidism stable on current dose no changes are necessary  4. Mixed hyperlipidemia stable on current dose no changes are necessary  6. Cerebral infarct old on Keppra for seizure control.

## 2022-09-09 ENCOUNTER — DOCUMENTATION (OUTPATIENT)
Dept: NEUROLOGY | Facility: CLINIC | Age: 52
End: 2022-09-09

## 2022-09-09 ENCOUNTER — TELEPHONE (OUTPATIENT)
Dept: NEUROLOGY | Facility: CLINIC | Age: 52
End: 2022-09-09

## 2022-09-09 NOTE — PROGRESS NOTES
We received a CMP and Keppra level from patient's PCP.  The Keppra level was ordered by Dr. Karimi. It is in the low range.  The CMP shows abnormal liver enzymes.  I did contact the PCP to make sure she had received the lab results.  I spoke with Bekah.  She did ask that I fax over the lab results to Maranda Denny NP.  I did fax those over.  I spoke with the patient as well and he does tell me that he and Maranda Denny spoke yesterday about his liver enzymes and he states he was over taking some detox vitamins.  He has now stopped those.  I did tell him as well that his Keppra level is in the low range and we will repeat that in a week.  He voices understanding.  He states he has not missed any Keppra doses.

## 2022-09-09 NOTE — TELEPHONE ENCOUNTER
Caller: Nathaniel Troy    Relationship: Self    Best call back number: [phone]    What was the call regarding: PT CALLED BACK STATING HIS ALARM CLOCK DID NOT GO OFF THIS MORNING, RESULTING IN HIS NO SHOWED APPT TODAY W/ DR. RUBI.    PT CALLING TO GET RESCHEDULED. PER ERIKA, SHE WOULD CHECK WITH DAVID TO SEE WHEN TO GET PT RESCHEDULED FOR AND WOULD CALL PT BACK. I ADVISED PT; PT VERBALIZED UNDERSTANDING.    Do you require a callback: YES, PLEASE CALL PT BACK TO RESCHEDULE.    PLEASE REVIEW AND ADVISE.

## 2022-09-15 ENCOUNTER — TELEPHONE (OUTPATIENT)
Dept: INTERNAL MEDICINE | Age: 52
End: 2022-09-15

## 2022-09-15 ENCOUNTER — OFFICE VISIT (OUTPATIENT)
Dept: NEUROLOGY | Facility: CLINIC | Age: 52
End: 2022-09-15

## 2022-09-15 VITALS
BODY MASS INDEX: 25.48 KG/M2 | WEIGHT: 178 LBS | RESPIRATION RATE: 18 BRPM | HEIGHT: 70 IN | SYSTOLIC BLOOD PRESSURE: 132 MMHG | HEART RATE: 72 BPM | DIASTOLIC BLOOD PRESSURE: 80 MMHG

## 2022-09-15 DIAGNOSIS — R56.9 GENERALIZED CONVULSIVE SEIZURES: ICD-10-CM

## 2022-09-15 DIAGNOSIS — Z86.73 HISTORY OF STROKE: ICD-10-CM

## 2022-09-15 PROCEDURE — 99214 OFFICE O/P EST MOD 30 MIN: CPT | Performed by: PSYCHIATRY & NEUROLOGY

## 2022-09-15 RX ORDER — LEVETIRACETAM 500 MG/1
500 TABLET ORAL 2 TIMES DAILY
Qty: 180 TABLET | Refills: 1 | Status: SHIPPED | OUTPATIENT
Start: 2022-09-15 | End: 2023-03-16 | Stop reason: SDUPTHER

## 2022-09-15 RX ORDER — LEVOTHYROXINE SODIUM 0.05 MG/1
50 TABLET ORAL DAILY
COMMUNITY

## 2022-09-15 NOTE — PATIENT INSTRUCTIONS
Patient to get with PCP as soon as possible about abnormalities in blood work.  Patient to have seizure and stroke precautions.  Patient to get to emergency room immediately if seizure or stroke occurs.  Seizure precautions including no no climbing or using sharp cutting  tools and to have caution work around hot fire/stove/grill/water.  Patient not to be swimming by self and not get in hot tub by self.  Patient take showers not baths.  Patient to review with PCP possible antiplatelet use after blood work stabilizes

## 2022-09-15 NOTE — TELEPHONE ENCOUNTER
Pt was here 09/08/22 but he did not ask you if he could restart taking his statin's he stated he took him self off of them after his last CMP . He also stopped over taking supplements . Would like to know what you think .  Please advise

## 2022-09-15 NOTE — PROGRESS NOTES
Subjective   Nathaniel Troy, 1970, is a male who is being seen today for   Chief Complaint   Patient presents with   • Stroke   • Seizures       HISTORY OF PRESENT ILLNESS: Patient seen for history of stroke and generalized seizures.  No seizures or stroke symptoms since last seen and denies any headaches.  He is taking the Keppra 500 mg 1 p.o. twice daily and last Keppra level was 8 but that was when he had already other abnormalities in the blood work and was taking multiple over-the-counter medications and supplements.  He has stopped all that and he is holding the Lipitor on his own right now.  Patient's liver enzymes had gone to  and .  Patient is being followed by PCP for these problems.  Also platelet count was low at 10 3000 and he was anemic with hemoglobin 12.7.  He does not want to repeat his blood work or Keppra level today and is going to be seen by his PCP in the next 2 to 3 weeks and will get blood work done then.  He also stopped magnesium.  Patient is given seizure and stroke precautions.  He is not now taking any type of antiplatelet medication either.  He is to review this with PCP when blood work stabilizes    REVIEW OF SYSTEMS:   GENERAL: Blood pressure today is 132/80 left arm seated in same standing with pulse 72.  PULMONARY: No acute respiratory difficulties  CVS: No chest pain or palpitation  GASTROINTESTINAL: No acute GI distress  GENITOURINARY: No acute  distress  GYN: Not applicable  MUSCULOSKELETAL: No acute musculoskeletal symptoms  HEENT: No acute hearing or vision change.  ENDOCRINE: Not diabetic  PSYCHIATRIC: No acute psychiatric process  HEMATOLOGY: As above  SKIN: No acute skin changes  Family history reviewed and otherwise noncontributory to this problem  Social history: Patient is retired.  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  Patient denies smoking or drug or alcohol use    PHYSICAL EXAMINATION:    GENERAL: Some intentional tremor  otherwise no acute process  CRANIUM: Normal cephalic/atraumatic  HEENT:       EYES: No acute fundic abnormalities.  Pupils equal round reactive to light.  EOMs intact without nystagmus and fields full to confrontation.       EARS: No acute tympanic membrane abnormalities and hears tuning fork bilaterally       THROAT: No acute oropharynx abnormalities no swallowing difficulties by history.       NECK: No bruits/no lymphadenopathy  CHEST: No acute cardiopulmonary abnormalities by auscultation  ABDOMEN: Nondistended  EXTREMITIES: Dorsalis pedis pulse symmetrical  NEURO: Patient alert and follows commands without difficulty  SPEECH: Normal    CRANIAL NERVES: Motor/sensory about the face normal and symmetric    MOTOR STRENGTH: Motor strength upper and lower extremities normal  STATION AND GAIT: Gait normal/Romberg negative  CEREBELLAR: Finger-nose and heel-to-shin normal  SENSORY: Pin and vibration upper and lower extremities normal  REFLEXES: Reflexes present symmetric upper and lower EXTR and without clonus or Babinski      ASSESSMENT AND PLAN: Patient with history of generalized seizures none since last seen.  No stroke symptoms since last seen.  Patient to get to emergency room immediately if stroke or seizure symptoms occur.  Seizure precautions reviewed and driving precautions.  I spent 30 minutes with this patient with counseling exam and review of records.  BMI in normal range for age.  Patient to get with PCP as soon as possible about abnormalities of blood work for follow-up and repeat Keppra level/magnesium level and CPK      Diagnoses and all orders for this visit:    1. Generalized convulsive seizures (HCC)  -     Levetiracetam Level (Keppra); Future  -     Magnesium; Future  -     levETIRAcetam (KEPPRA) 500 MG tablet; Take 1 tablet by mouth 2 (Two) Times a Day.  Dispense: 180 tablet; Refill: 1  -     CK; Future    2. History of stroke        Dictated utilizing Dragon voice recognition software

## 2022-10-28 DIAGNOSIS — R79.89 ELEVATED LIVER FUNCTION TESTS: ICD-10-CM

## 2022-10-28 DIAGNOSIS — E78.2 MIXED HYPERLIPIDEMIA: ICD-10-CM

## 2022-10-28 DIAGNOSIS — K76.0 FATTY LIVER: ICD-10-CM

## 2022-10-28 LAB
ALBUMIN SERPL-MCNC: 4.4 G/DL (ref 3.5–5.2)
ALP BLD-CCNC: 117 U/L (ref 40–130)
ALT SERPL-CCNC: 66 U/L (ref 5–41)
ANION GAP SERPL CALCULATED.3IONS-SCNC: 13 MMOL/L (ref 7–19)
AST SERPL-CCNC: 43 U/L (ref 5–40)
BILIRUB SERPL-MCNC: 0.4 MG/DL (ref 0.2–1.2)
BUN BLDV-MCNC: 18 MG/DL (ref 6–20)
CALCIUM SERPL-MCNC: 9.5 MG/DL (ref 8.6–10)
CHLORIDE BLD-SCNC: 105 MMOL/L (ref 98–111)
CO2: 24 MMOL/L (ref 22–29)
CREAT SERPL-MCNC: 0.8 MG/DL (ref 0.5–1.2)
GFR SERPL CREATININE-BSD FRML MDRD: >60 ML/MIN/{1.73_M2}
GLUCOSE BLD-MCNC: 97 MG/DL (ref 74–109)
MAGNESIUM: 2.4 MG/DL (ref 1.6–2.6)
POTASSIUM SERPL-SCNC: 4.5 MMOL/L (ref 3.5–5)
SODIUM BLD-SCNC: 142 MMOL/L (ref 136–145)
TOTAL CK: 127 U/L (ref 39–308)
TOTAL PROTEIN: 6.9 G/DL (ref 6.6–8.7)

## 2022-10-28 NOTE — TELEPHONE ENCOUNTER
Kishore called requesting a refill of the below medication which has been pended for you:     Requested Prescriptions     Pending Prescriptions Disp Refills    simvastatin (ZOCOR) 40 MG tablet [Pharmacy Med Name: SIMVASTATIN 40 MG TABLET] 90 tablet 3     Sig: TAKE 1 TABLET BY MOUTH EVERY DAY AT NIGHT       Last Appointment Date: 9/8/2022  Next Appointment Date: 10/31/2022    Allergies   Allergen Reactions    Dilantin [Phenytoin]     Phenytoin      rash

## 2022-10-31 ENCOUNTER — TELEPHONE (OUTPATIENT)
Dept: NEUROLOGY | Facility: CLINIC | Age: 52
End: 2022-10-31

## 2022-10-31 ENCOUNTER — OFFICE VISIT (OUTPATIENT)
Dept: INTERNAL MEDICINE | Age: 52
End: 2022-10-31
Payer: COMMERCIAL

## 2022-10-31 VITALS
WEIGHT: 181 LBS | HEART RATE: 67 BPM | BODY MASS INDEX: 25.97 KG/M2 | OXYGEN SATURATION: 99 % | DIASTOLIC BLOOD PRESSURE: 76 MMHG | TEMPERATURE: 97.1 F | SYSTOLIC BLOOD PRESSURE: 130 MMHG

## 2022-10-31 DIAGNOSIS — E78.2 MIXED HYPERLIPIDEMIA: ICD-10-CM

## 2022-10-31 DIAGNOSIS — R79.89 ELEVATED LIVER FUNCTION TESTS: ICD-10-CM

## 2022-10-31 DIAGNOSIS — I63.50 CEREBRAL ARTERY OCCLUSION WITH CEREBRAL INFARCTION (HCC): ICD-10-CM

## 2022-10-31 LAB — KEPPRA: 7 UG/ML (ref 10–40)

## 2022-10-31 PROCEDURE — 99213 OFFICE O/P EST LOW 20 MIN: CPT | Performed by: NURSE PRACTITIONER

## 2022-10-31 PROCEDURE — 3074F SYST BP LT 130 MM HG: CPT | Performed by: NURSE PRACTITIONER

## 2022-10-31 PROCEDURE — 3078F DIAST BP <80 MM HG: CPT | Performed by: NURSE PRACTITIONER

## 2022-10-31 RX ORDER — SIMVASTATIN 40 MG
TABLET ORAL
Qty: 90 TABLET | Refills: 3 | Status: SHIPPED | OUTPATIENT
Start: 2022-10-31 | End: 2022-10-31

## 2022-10-31 ASSESSMENT — ENCOUNTER SYMPTOMS
STRIDOR: 0
CONSTIPATION: 0
SORE THROAT: 0
DIARRHEA: 0
BLOOD IN STOOL: 0
ABDOMINAL DISTENTION: 0
COLOR CHANGE: 0
NAUSEA: 0
ABDOMINAL PAIN: 0
TROUBLE SWALLOWING: 0
SHORTNESS OF BREATH: 0
VOMITING: 0
EYE ITCHING: 0
WHEEZING: 0
CHOKING: 0
EYE DISCHARGE: 0
COUGH: 0

## 2022-10-31 NOTE — ASSESSMENT & PLAN NOTE
We have had to get off statins due to markedly elevated LFTs and they are back to almost normal off of the simvastatin.

## 2022-10-31 NOTE — ASSESSMENT & PLAN NOTE
He was started on seizure medication at that time he never had a seizure has not had one since then and he really would like to get off of it.   But he is followed by Dr. Sania Joy advised him to call and talk to Dr. Sania Joy for weaning dose and see if he agreed

## 2022-10-31 NOTE — PROGRESS NOTES
[address] [facility]  [address] [address] [address] [place] [address]  Dept: [phone]  Dept Fax: [phone]: [phone]    Farzad Hills (:  1970) is a 45 y.o. male,Established patient  with green , here for evaluation of the following chief complaint(s): Follow-up      Farzad Hills is a 45 y.o. male who presents today for his medical conditions/complaints as noted below. Farzad Hills is c/bradley Follow-up        HPI:     Chief Complaint   Patient presents with    Follow-up     HPI    Hyperlipidemia he was on simvastatin had to come off of that due to elevated LFTs he is back today for recheck of liver functions. Elevated LFT  off statin  down from 295/312 to 66/45    Old CVA  he has been on seizure medication; since ;  he has never had a seizure; he would really like to get off him;       Past Medical History:   Diagnosis Date    Asthma     Cerebral artery occlusion with cerebral infarction ([address]     Fatty liver     Hemorrhage, intracerebral ([address]     history of hemorrhage to the brain, resolved. right hemiparesis. Hypertension     Mixed hyperlipidemia     Stroke [facility])       Past Surgical History:   Procedure Laterality Date    CHOLECYSTECTOMY      with cholangiography  Dr FELIPE España Plan 10/31/2022 2022 2022 3/1/2022 2021 3/06/8501   SYSTOLIC 728 101 108 336 523 721   DIASTOLIC 76 80 60 88 70 94   Pulse 67 60 60 62 - 67   Temp 97.1 - - - - 97.6   Resp - - - - - 16   SpO2 99 98 98 99 - 94   Weight 181 lb 174 lb 185 lb 185 lb 184 lb -   Height - 5' 10\" 5' 10\" 5' 7\" - -   Body mass index - 24.96 kg/m2 26.54 kg/m2 28.97 kg/m2 - -   Pain Level - - - - - -   Some recent data might be hidden       History reviewed. No pertinent family history.     Social History     Tobacco Use    Smoking status: Never    Smokeless tobacco: Never   Substance Use Topics    Alcohol use: Never      Current Outpatient (H) 10/28/2022    LABGLOM >60 10/28/2022    GFRAA >59 09/06/2022     Lab Results   Component Value Date    CHOL 162 09/06/2022    CHOL 182 05/31/2022    CHOL 169 02/25/2022     Lab Results   Component Value Date    TRIG 112 05/31/2022    TRIG 78 02/25/2022    TRIG 76 12/12/2018     Lab Results   Component Value Date    HDL 55 05/31/2022    HDL 56 02/25/2022    HDL 67 12/12/2018     Lab Results   Component Value Date    LDLCALC 105 05/31/2022    LDLCALC 97 02/25/2022    LDLCALC 73 12/12/2018     Lab Results   Component Value Date/Time     10/28/2022 10:04 AM    K 4.5 10/28/2022 10:04 AM    K 3.3 08/26/2021 04:17 AM     10/28/2022 10:04 AM    CO2 24 10/28/2022 10:04 AM    BUN 18 10/28/2022 10:04 AM    CREATININE 0.8 10/28/2022 10:04 AM    GLUCOSE 97 10/28/2022 10:04 AM    CALCIUM 9.5 10/28/2022 10:04 AM      Lab Results   Component Value Date    WBC 5.7 09/06/2022    HGB 12.7 (L) 09/06/2022    HCT 38.8 (L) 09/06/2022    .8 (H) 09/06/2022     (L) 09/06/2022    LYMPHOPCT 36.5 09/06/2022    RBC 3.85 (L) 09/06/2022    MCH 33.0 (H) 09/06/2022    MCHC 32.7 (L) 09/06/2022    RDW 14.9 (H) 09/06/2022     Lab Results   Component Value Date    VITD25 44.6 09/06/2022     Labs reviewed from 10/28/2022    Subjective:      Review of Systems   Constitutional:  Negative for fatigue, fever and unexpected weight change. HENT:  Negative for ear discharge, ear pain, mouth sores, sore throat and trouble swallowing. Eyes:  Negative for discharge, itching and visual disturbance. Respiratory:  Negative for cough, choking, shortness of breath, wheezing and stridor. Cardiovascular:  Negative for chest pain, palpitations and leg swelling. Gastrointestinal:  Negative for abdominal distention, abdominal pain, blood in stool, constipation, diarrhea, nausea and vomiting. Endocrine: Negative for cold intolerance, polydipsia and polyuria.    Genitourinary:  Negative for difficulty urinating, dysuria, frequency and urgency. Musculoskeletal:  Negative for arthralgias and gait problem. Skin:  Negative for color change and rash. Allergic/Immunologic: Negative for food allergies and immunocompromised state. Neurological:  Negative for dizziness, tremors, syncope, speech difficulty, weakness and headaches. Hematological:  Negative for adenopathy. Does not bruise/bleed easily. Psychiatric/Behavioral:  Negative for confusion and hallucinations. Objective:     Physical Exam  Constitutional:       General: He is not in acute distress. Appearance: He is well-developed. HENT:      Head: Normocephalic and atraumatic. Eyes:      General: No scleral icterus. Right eye: No discharge. Left eye: No discharge. Pupils: Pupils are equal, round, and reactive to light. Neck:      Thyroid: No thyromegaly. Vascular: No JVD. Cardiovascular:      Rate and Rhythm: Normal rate and regular rhythm. Heart sounds: Normal heart sounds. No murmur heard. Pulmonary:      Effort: Pulmonary effort is normal. No respiratory distress. Breath sounds: Normal breath sounds. No wheezing or rales. Abdominal:      General: Bowel sounds are normal. There is no distension. Palpations: Abdomen is soft. There is no mass. Tenderness: There is no abdominal tenderness. There is no guarding or rebound. Musculoskeletal:         General: No tenderness. Normal range of motion. Cervical back: Normal range of motion and neck supple. Skin:     General: Skin is warm and dry. Findings: No erythema or rash. Neurological:      Mental Status: He is alert and oriented to person, place, and time. Cranial Nerves: No cranial nerve deficit. Coordination: Coordination normal.      Deep Tendon Reflexes: Reflexes are normal and symmetric. Reflexes normal.   Psychiatric:         Mood and Affect: Mood is not depressed. Behavior: Behavior normal.         Thought Content:  Thought content normal.         Judgment: Judgment normal.     /76   Pulse 67   Temp 97.1 °F (36.2 °C)   Wt 181 lb (82.1 kg)   SpO2 99%   BMI 25.97 kg/m²           Assessment:      Problem List       Cerebral artery occlusion with cerebral infarction [facility])     He was started on seizure medication at that time he never had a seizure has not had one since then and he really would like to get off of it. But he is followed by Dr. Rayne Bradley advised him to call and talk to Dr. Rayne Bradley for weaning dose and see if he agreed          Relevant Orders    Levetiracetam Level    Elevated liver function tests     These are back to almost normal off the statin          Mixed hyperlipidemia     We have had to get off statins due to markedly elevated LFTs and they are back to almost normal off of the simvastatin. Relevant Medications    enalapril (VASOTEC) 10 MG tablet    labetalol (NORMODYNE) 200 MG tablet    Other Relevant Orders    CBC with Auto Differential    Comprehensive Metabolic Panel    Lipid Panel       Plan:        Patient given educational materials - see patient instructions. Discussed use, benefit, and side effects of prescribed medications. Allpatient questions answered. Pt voiced understanding. Reviewed health maintenance. Instructed to continue current medications, diet and exercise. Patient agreed with treatment plan. Follow up as directed. MEDICATIONS:  No orders of the defined types were placed in this encounter. ORDERS:  Orders Placed This Encounter   Procedures    CBC with Auto Differential    Comprehensive Metabolic Panel    Lipid Panel    Levetiracetam Level         Follow-up:  History of cva with keppra by Dr Rayne Bradley;   he would [facility] get off it as he has never had a seizure; Hyperlipidemia;  LFT down with stopping statin   No follow-ups on file. PATIENT INSTRUCTIONS:  There are no Patient Instructions on file for this visit.   Electronically signed by MICHAEL Caceres on 10/31/2022 at 3:40 PM    @    Ayan/transcription disclaimer:  Much of this encounter note is electronic transcription/translation of spoken language to printed texts. The electronic translation of spoken language may be erroneous, or at times,nonsensical words or phrases may be inadvertently transcribed.   Although I have reviewed the note for such errors, some may still exist.

## 2022-11-01 DIAGNOSIS — R56.9 GENERALIZED CONVULSIVE SEIZURES: Primary | ICD-10-CM

## 2022-11-18 DIAGNOSIS — E03.9 HYPOTHYROIDISM, UNSPECIFIED TYPE: ICD-10-CM

## 2022-11-18 DIAGNOSIS — I10 ESSENTIAL HYPERTENSION: ICD-10-CM

## 2022-11-21 RX ORDER — ENALAPRIL MALEATE 10 MG/1
TABLET ORAL
Qty: 180 TABLET | Refills: 1 | Status: SHIPPED | OUTPATIENT
Start: 2022-11-21

## 2022-11-21 RX ORDER — LEVOTHYROXINE SODIUM 0.05 MG/1
TABLET ORAL
Qty: 90 TABLET | Refills: 1 | Status: SHIPPED | OUTPATIENT
Start: 2022-11-21

## 2022-12-20 ENCOUNTER — TELEPHONE (OUTPATIENT)
Dept: NEUROLOGY | Facility: CLINIC | Age: 52
End: 2022-12-20

## 2022-12-20 NOTE — TELEPHONE ENCOUNTER
PATIENT CALLING TO ADVISE HE JUST HAD HIS KEPPRA LEVEL DRAWN; PATIENT HAS NOT BEEN SKIPPING MEDICATION DOSES -- HE SAYS HE MAY MISS A DOSE OCCASIONALLY BUT HE NEVER INTENTIONALLY SKIPS ANY MEDICATION DOSES.    HE SPOKE WITH HIS PCP - CYNDY CLEVELAND - AND THIS CHANGE IN KEPPRA LEVEL HAS BEEN SINCE SHE ADDED SYNTHROID.    PATIENT IS NOT SURE WHAT IS CAUSING THIS LEVEL BUT WANTED TO MAKE SURE DAVID KNEW HE DID NOT SKIP MEDICATIONS.           Kp Ramesh)

## 2022-12-22 LAB — KEPPRA: 11 UG/ML (ref 10–40)

## 2022-12-27 ENCOUNTER — TELEPHONE (OUTPATIENT)
Dept: NEUROLOGY | Facility: CLINIC | Age: 52
End: 2022-12-27

## 2022-12-27 NOTE — TELEPHONE ENCOUNTER
Caller: Nathaniel Troy    Relationship: Self    Best call back number:[phone]    What is the best time to reach you: ANY    Who are you requesting to speak with (clinical staff, provider,  specific staff member): DAVID    What was the call regarding: PATIENT TELEPHONED TO ADVISE HIS KEPPRA LEVELS RESULTS ARE 11.     NO CALL BACK IS NEEDED UNLESS YOU HAVE FURTHER QUESTIONS OR CONCERNS    THANK YOU

## 2022-12-28 ENCOUNTER — TELEPHONE (OUTPATIENT)
Dept: NEUROLOGY | Facility: CLINIC | Age: 52
End: 2022-12-28

## 2022-12-28 NOTE — TELEPHONE ENCOUNTER
----- Message from Edis Karimi MD sent at 12/28/2022 10:04 AM CST -----  Tell patient that his Keppra level is in therapeutic range and continue present treatment plan  ----- Message -----  From: Asiya Danielle LPN  Sent: 12/28/2022   9:42 AM CST  To: Edis Karimi MD    Received a message from the hub, Nathaniel had the Keppra level done a [facility], it was 11.

## 2023-01-10 DIAGNOSIS — I10 ESSENTIAL HYPERTENSION: ICD-10-CM

## 2023-01-10 RX ORDER — LABETALOL 200 MG/1
TABLET, FILM COATED ORAL
Qty: 90 TABLET | Refills: 1 | Status: SHIPPED | OUTPATIENT
Start: 2023-01-10

## 2023-03-14 DIAGNOSIS — E78.2 MIXED HYPERLIPIDEMIA: ICD-10-CM

## 2023-03-14 DIAGNOSIS — I63.50 CEREBRAL ARTERY OCCLUSION WITH CEREBRAL INFARCTION (HCC): ICD-10-CM

## 2023-03-14 LAB
ALBUMIN SERPL-MCNC: 4.6 G/DL (ref 3.5–5.2)
ALP SERPL-CCNC: 101 U/L (ref 40–130)
ALT SERPL-CCNC: 36 U/L (ref 5–41)
ANION GAP SERPL CALCULATED.3IONS-SCNC: 8 MMOL/L (ref 7–19)
AST SERPL-CCNC: 32 U/L (ref 5–40)
BASOPHILS # BLD: 0.1 K/UL (ref 0–0.2)
BASOPHILS NFR BLD: 2 % (ref 0–1)
BILIRUB SERPL-MCNC: 0.5 MG/DL (ref 0.2–1.2)
BUN SERPL-MCNC: 19 MG/DL (ref 6–20)
CALCIUM SERPL-MCNC: 9.7 MG/DL (ref 8.6–10)
CHLORIDE SERPL-SCNC: 107 MMOL/L (ref 98–111)
CHOLEST SERPL-MCNC: 198 MG/DL (ref 160–199)
CO2 SERPL-SCNC: 28 MMOL/L (ref 22–29)
CREAT SERPL-MCNC: 0.9 MG/DL (ref 0.5–1.2)
EOSINOPHIL # BLD: 0.4 K/UL (ref 0–0.6)
EOSINOPHIL NFR BLD: 6.6 % (ref 0–5)
ERYTHROCYTE [DISTWIDTH] IN BLOOD BY AUTOMATED COUNT: 13.1 % (ref 11.5–14.5)
GLUCOSE SERPL-MCNC: 93 MG/DL (ref 74–109)
HCT VFR BLD AUTO: 49.6 % (ref 42–52)
HDLC SERPL-MCNC: 46 MG/DL (ref 55–121)
HGB BLD-MCNC: 16.4 G/DL (ref 14–18)
IMM GRANULOCYTES # BLD: 0 K/UL
LDLC SERPL CALC-MCNC: 128 MG/DL
LYMPHOCYTES # BLD: 2.2 K/UL (ref 1.1–4.5)
LYMPHOCYTES NFR BLD: 33.5 % (ref 20–40)
MCH RBC QN AUTO: 32.5 PG (ref 27–31)
MCHC RBC AUTO-ENTMCNC: 33.1 G/DL (ref 33–37)
MCV RBC AUTO: 98.4 FL (ref 80–94)
MONOCYTES # BLD: 0.8 K/UL (ref 0–0.9)
MONOCYTES NFR BLD: 11.9 % (ref 0–10)
NEUTROPHILS # BLD: 3 K/UL (ref 1.5–7.5)
NEUTS SEG NFR BLD: 45.7 % (ref 50–65)
PLATELET # BLD AUTO: 184 K/UL (ref 130–400)
PMV BLD AUTO: 10.9 FL (ref 9.4–12.4)
POTASSIUM SERPL-SCNC: 5.1 MMOL/L (ref 3.5–5)
PROT SERPL-MCNC: 7.5 G/DL (ref 6.6–8.7)
RBC # BLD AUTO: 5.04 M/UL (ref 4.7–6.1)
SODIUM SERPL-SCNC: 143 MMOL/L (ref 136–145)
TRIGL SERPL-MCNC: 122 MG/DL (ref 0–149)
WBC # BLD AUTO: 6.5 K/UL (ref 4.8–10.8)

## 2023-03-15 ENCOUNTER — TELEPHONE (OUTPATIENT)
Dept: NEUROLOGY | Facility: CLINIC | Age: 53
End: 2023-03-15
Payer: COMMERCIAL

## 2023-03-15 ENCOUNTER — OFFICE VISIT (OUTPATIENT)
Dept: INTERNAL MEDICINE | Age: 53
End: 2023-03-15
Payer: COMMERCIAL

## 2023-03-15 VITALS
SYSTOLIC BLOOD PRESSURE: 125 MMHG | OXYGEN SATURATION: 99 % | TEMPERATURE: 97.7 F | HEIGHT: 70 IN | RESPIRATION RATE: 18 BRPM | BODY MASS INDEX: 26.92 KG/M2 | HEART RATE: 98 BPM | WEIGHT: 188 LBS | DIASTOLIC BLOOD PRESSURE: 82 MMHG

## 2023-03-15 DIAGNOSIS — E55.9 VITAMIN D DEFICIENCY: Primary | ICD-10-CM

## 2023-03-15 DIAGNOSIS — Z12.5 ENCOUNTER FOR PROSTATE CANCER SCREENING: ICD-10-CM

## 2023-03-15 DIAGNOSIS — I10 PRIMARY HYPERTENSION: ICD-10-CM

## 2023-03-15 DIAGNOSIS — E78.2 MIXED HYPERLIPIDEMIA: ICD-10-CM

## 2023-03-15 DIAGNOSIS — I63.50 CEREBRAL ARTERY OCCLUSION WITH CEREBRAL INFARCTION (HCC): ICD-10-CM

## 2023-03-15 DIAGNOSIS — R56.9 GENERALIZED CONVULSIVE SEIZURES: Primary | ICD-10-CM

## 2023-03-15 DIAGNOSIS — E03.9 HYPOTHYROIDISM, UNSPECIFIED TYPE: ICD-10-CM

## 2023-03-15 PROBLEM — F10.929 ALCOHOL INTOXICATION (HCC): Status: RESOLVED | Noted: 2021-08-25 | Resolved: 2023-03-15

## 2023-03-15 LAB — LEVETIRACETAM SERPL-MCNC: 6 UG/ML (ref 10–40)

## 2023-03-15 PROCEDURE — 99214 OFFICE O/P EST MOD 30 MIN: CPT | Performed by: NURSE PRACTITIONER

## 2023-03-15 PROCEDURE — 3074F SYST BP LT 130 MM HG: CPT | Performed by: NURSE PRACTITIONER

## 2023-03-15 PROCEDURE — 3079F DIAST BP 80-89 MM HG: CPT | Performed by: NURSE PRACTITIONER

## 2023-03-15 SDOH — ECONOMIC STABILITY: INCOME INSECURITY: HOW HARD IS IT FOR YOU TO PAY FOR THE VERY BASICS LIKE FOOD, HOUSING, MEDICAL CARE, AND HEATING?: NOT HARD AT ALL

## 2023-03-15 SDOH — ECONOMIC STABILITY: FOOD INSECURITY: WITHIN THE PAST 12 MONTHS, YOU WORRIED THAT YOUR FOOD WOULD RUN OUT BEFORE YOU GOT MONEY TO BUY MORE.: NEVER TRUE

## 2023-03-15 SDOH — ECONOMIC STABILITY: HOUSING INSECURITY
IN THE LAST 12 MONTHS, WAS THERE A TIME WHEN YOU DID NOT HAVE A STEADY PLACE TO SLEEP OR SLEPT IN A SHELTER (INCLUDING NOW)?: NO

## 2023-03-15 SDOH — ECONOMIC STABILITY: FOOD INSECURITY: WITHIN THE PAST 12 MONTHS, THE FOOD YOU BOUGHT JUST DIDN'T LAST AND YOU DIDN'T HAVE MONEY TO GET MORE.: NEVER TRUE

## 2023-03-15 ASSESSMENT — ENCOUNTER SYMPTOMS
COLOR CHANGE: 0
ABDOMINAL DISTENTION: 0
BLOOD IN STOOL: 0
TROUBLE SWALLOWING: 0
CHOKING: 0
SORE THROAT: 0
ABDOMINAL PAIN: 0
EYE DISCHARGE: 0
COUGH: 0
STRIDOR: 0
NAUSEA: 0
EYE ITCHING: 0
WHEEZING: 0
CONSTIPATION: 0
DIARRHEA: 0
VOMITING: 0
SHORTNESS OF BREATH: 0

## 2023-03-15 ASSESSMENT — PATIENT HEALTH QUESTIONNAIRE - PHQ9
SUM OF ALL RESPONSES TO PHQ QUESTIONS 1-9: 0
2. FEELING DOWN, DEPRESSED OR HOPELESS: 0
SUM OF ALL RESPONSES TO PHQ QUESTIONS 1-9: 0
1. LITTLE INTEREST OR PLEASURE IN DOING THINGS: 0
SUM OF ALL RESPONSES TO PHQ QUESTIONS 1-9: 0
SUM OF ALL RESPONSES TO PHQ QUESTIONS 1-9: 0
SUM OF ALL RESPONSES TO PHQ9 QUESTIONS 1 & 2: 0

## 2023-03-15 NOTE — TELEPHONE ENCOUNTER
----- Message from Edis Karimi MD sent at 3/15/2023  2:42 PM CDT -----  Contact patient the patient's blood Keppra level is below therapeutic range at 6 with normal range 10-40.  Patient to take his medication regularly for 2 weeks and repeat his level in 2 weeks.  I put that in epic  ----- Message -----  From: Asiya Danielle LPN  Sent: 3/15/2023  11:37 AM CDT  To: MD Nathaniel Howard would like Keppra level result.

## 2023-03-15 NOTE — PATIENT INSTRUCTIONS
1.  Cerebral artery aneurysm stable continue Keppra  2. Hypertension good control with current meds no changes  3. Hypothyroidism stable current dose of 50 mcg daily  4.   Hyperlipidemia restart the lipitor you have at home

## 2023-03-15 NOTE — TELEPHONE ENCOUNTER
"    Caller: Moises Briscoe \"moises briscoe\"    Relationship: Self    Best call back number: [phone]    Caller requesting test results: MOISES    What test was performed: KEPPRA RESULTS    When was the test performed: 3/14/23    Where was the test performed: [facility]          "

## 2023-03-15 NOTE — PROGRESS NOTES
[address] [facility]  [address] [address] [place] [address] Chika [place] [address]  Dept: [phone]  Dept Fax: [phone]: [phone]    Eric Kumar (:  1970) is a 52 y.o. male,Established patient  with green, here for evaluation of the following chief complaint(s): Annual Exam (Physical/Wants to talk about new medication/)      Eric Kumar is a 52 y.o. male who presents today for his medical conditions/complaints as noted below. 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 Hypertension he is stable with labetalol 200 mg and Vasotec 10 mg  3 hypothyroidism he is stable with 50 mcg daily  4. Hyperlipidemia he is on no medicine for this he tries to use a very good diet cholesterol is 198 triglycerides 122; we had stopped the statins due to myalgias; he has  some lipitor 20 at home;     Past Medical History:   Diagnosis Date    Alcohol intoxication ([facility] [address] 2021    Asthma     Cerebral artery occlusion with cerebral infarction ([facility] [address]     Fatty liver     Hemorrhage, intracerebral (HCC)     history of hemorrhage to the brain, resolved. right hemiparesis.      Hypertension     Mixed hyperlipidemia     Stroke [facility])       Past Surgical History:   Procedure Laterality Date    CHOLECYSTECTOMY      with cholangiography  Dr FELIPE Jesus 3/15/2023 10/31/2022 2022 2022 3/1/2022 7522   SYSTOLIC [phone] 048   DIASTOLIC 82 76 80 60 88 70   Pulse 98 67 60 60 62 -   Temp 97.7 97.1 - - - -   Resp 18 - - - - -   SpO2 99 99 98 98 99 -   Weight 188 lb 181 lb 174 lb 185 lb 185 lb 184 lb   Height 5' 10\" - 5' 10\" 5' 10\" 5' 7\" -   Body mass index 26.97 kg/m2 - 24.96 kg/m2 26.54 kg/m2 28.97 kg/m2 -   Pain Level - - - - - -   Some recent data might be hidden       No family history on file. Social History     Tobacco Use    Smoking status: Never    Smokeless tobacco: Never   Substance Use Topics    Alcohol use: Never      Current Outpatient Medications   Medication Sig Dispense Refill    labetalol (NORMODYNE) 200 MG tablet TAKE 1 TABLET BY MOUTH EVERY DAY 90 tablet 1    enalapril (VASOTEC) 10 MG tablet TAKE 1 TABLET BY MOUTH TWICE A  tablet 1    levothyroxine (SYNTHROID) 50 MCG tablet TAKE 1 TABLET BY MOUTH EVERY DAY 90 tablet 1    levETIRAcetam (KEPPRA) 500 MG tablet Take 500 mg by mouth 2 times daily      Multiple Vitamins-Minerals (THERAPEUTIC MULTIVITAMIN-MINERALS) tablet Take 1 tablet by mouth daily      fluticasone (FLONASE) 50 MCG/ACT nasal spray 50 sprays       No current facility-administered medications for this visit.      Allergies   Allergen Reactions    Dilantin [Phenytoin]     Phenytoin      rash       Health Maintenance   Topic Date Due    COVID-19 Vaccine (1) 03/14/2024 (Originally 1970)    Flu vaccine (1) 03/15/2024 (Originally 8/1/2022)    Shingles vaccine (1 of 2) 03/15/2024 (Originally 2/28/2018)    Depression Screen  06/01/2023    DTaP/Tdap/Td vaccine (2 - Td or Tdap) 07/14/2027    Lipids  03/14/2028    Colorectal Cancer Screen  12/10/2030    Pneumococcal 0-64 years Vaccine  Completed    Hepatitis C screen  Completed    HIV screen  Completed    Hepatitis A vaccine  Aged Out    Hib vaccine  Aged Out    Meningococcal (ACWY) vaccine  Aged Out       No results found for: LABA1C  Lab Results   Component Value Date    PSA 0.44 02/25/2022    PSA 0.37 11/18/2020     TSH   Date Value Ref Range Status   05/31/2022 3.080 0.270 - 4.200 uIU/mL Final   ]  Lab Results   Component Value Date     03/14/2023    K 5.1 (H) 03/14/2023     03/14/2023    CO2 28 03/14/2023    BUN 19 03/14/2023    CREATININE 0.9 03/14/2023    GLUCOSE 93 03/14/2023    CALCIUM 9.7 03/14/2023    PROT 7.5 03/14/2023    LABALBU 4.6 03/14/2023    BILITOT 0.5 03/14/2023    ALKPHOS 101 03/14/2023    AST 32 03/14/2023    ALT 36 03/14/2023    LABGLOM >60 03/14/2023    GFRAA >59 09/06/2022     Lab Results   Component Value Date    CHOL 198 03/14/2023    CHOL 162 09/06/2022    CHOL 182 05/31/2022     Lab Results   Component Value Date    TRIG 122 03/14/2023    TRIG 112 05/31/2022    TRIG 78 02/25/2022     Lab Results   Component Value Date    HDL 46 (L) 03/14/2023    HDL 55 05/31/2022    HDL 56 02/25/2022     Lab Results   Component Value Date    LDLCALC 128 03/14/2023    LDLCALC 105 05/31/2022    LDLCALC 97 02/25/2022     Lab Results   Component Value Date/Time     03/14/2023 10:39 AM    K 5.1 03/14/2023 10:39 AM    K 3.3 08/26/2021 04:17 AM     03/14/2023 10:39 AM    CO2 28 03/14/2023 10:39 AM    BUN 19 03/14/2023 10:39 AM    CREATININE 0.9 03/14/2023 10:39 AM    GLUCOSE 93 03/14/2023 10:39 AM    CALCIUM 9.7 03/14/2023 10:39 AM      Lab Results   Component Value Date    WBC 6.5 03/14/2023    HGB 16.4 03/14/2023    HCT 49.6 03/14/2023    MCV 98.4 (H) 03/14/2023     03/14/2023    LYMPHOPCT 33.5 03/14/2023    RBC 5.04 03/14/2023    MCH 32.5 (H) 03/14/2023    MCHC 33.1 03/14/2023    RDW 13.1 03/14/2023     Lab Results   Component Value Date    VITD25 44.6 09/06/2022     Labs reviewed from 3/14/2023    Subjective:      Review of Systems   Constitutional:  Negative for fatigue, fever and unexpected weight change. HENT:  Negative for ear discharge, ear pain, mouth sores, sore throat and trouble swallowing. Eyes:  Negative for discharge, itching and visual disturbance. Respiratory:  Negative for cough, choking, shortness of breath, wheezing and stridor. Cardiovascular:  Negative for chest pain, palpitations and leg swelling. Gastrointestinal:  Negative for abdominal distention, abdominal pain, blood in stool, constipation, diarrhea, nausea and vomiting. Endocrine: Negative for cold intolerance, polydipsia and polyuria. Genitourinary:  Negative for difficulty urinating, dysuria, frequency and urgency. Musculoskeletal:  Negative for arthralgias and gait problem. Skin:  Negative for color change and rash. Allergic/Immunologic: Negative for food allergies and immunocompromised state. Neurological:  Negative for dizziness, tremors, syncope, speech difficulty, weakness and headaches. Hematological:  Negative for adenopathy. Does not bruise/bleed easily. Psychiatric/Behavioral:  Negative for confusion and hallucinations. Objective:     Physical Exam  Constitutional:       General: He is not in acute distress. Appearance: He is well-developed. HENT:      Head: Normocephalic and atraumatic. Eyes:      General: No scleral icterus. Right eye: No discharge. Left eye: No discharge. Pupils: Pupils are equal, round, and reactive to light. Neck:      Thyroid: No thyromegaly. Vascular: No JVD. Cardiovascular:      Rate and Rhythm: Normal rate and regular rhythm. Heart sounds: Normal heart sounds. No murmur heard. Pulmonary:      Effort: Pulmonary effort is normal. No respiratory distress. Breath sounds: Normal breath sounds. No wheezing or rales. Abdominal:      General: Bowel sounds are normal. There is no distension. Palpations: Abdomen is soft. There is no mass. Tenderness: There is no abdominal tenderness. There is no guarding or rebound. Musculoskeletal:         General: No tenderness. Normal range of motion. Cervical back: Normal range of motion and neck supple. Skin:     General: Skin is warm and dry. Findings: No erythema or rash. Neurological:      Mental Status: He is alert and oriented to person, place, and time.       Cranial Nerves: No cranial nerve deficit. Coordination: Coordination normal.      Deep Tendon Reflexes: Reflexes are normal and symmetric. Reflexes normal.   Psychiatric:         Mood and Affect: Mood is not depressed. Behavior: Behavior normal.         Thought Content: Thought content normal.         Judgment: Judgment normal.     /82   Pulse 98   Temp 97.7 °F (36.5 °C)   Resp 18   Ht 5' 10\" (1.778 m)   Wt 188 lb (85.3 kg)   SpO2 99%   BMI 26.98 kg/m²           Assessment:      Problem List       Cerebral artery occlusion with cerebral infarction [facility])     He is stable with Keppra          Relevant Orders    Levetiracetam Level    Hypertension     He is stable with labetalol 20 and Vasotec 10          Relevant Medications    enalapril (VASOTEC) 10 MG tablet    labetalol (NORMODYNE) 200 MG tablet    Other Relevant Orders    CBC with Auto Differential    Comprehensive Metabolic Panel    Urinalysis with Reflex to Culture    TSH    CBC with Auto Differential    Comprehensive Metabolic Panel    Urinalysis with Reflex to Culture    TSH    Hypothyroid     Stable with 50 mcg daily          Relevant Medications    levothyroxine (SYNTHROID) 50 MCG tablet    Mixed hyperlipidemia    Relevant Medications    enalapril (VASOTEC) 10 MG tablet    labetalol (NORMODYNE) 200 MG tablet    Other Relevant Orders    Lipid Panel    Lipid Panel       Plan:        Patient given educational materials - see patient instructions. Discussed use, benefit, and side effects of prescribed medications. Allpatient questions answered. Pt voiced understanding. Reviewed health maintenance. Instructed to continue current medications, diet and exercise. Patient agreed with treatment plan. Follow up as directed. MEDICATIONS:  No orders of the defined types were placed in this encounter.         ORDERS:  Orders Placed This Encounter   Procedures    CBC with Auto Differential    Comprehensive Metabolic Panel    Vitamin D 25 Hydroxy    Urinalysis with Reflex to Culture    TSH    Lipid Panel    Levetiracetam Level    PSA Screening    CBC with Auto Differential    Comprehensive Metabolic Panel    Lipid Panel    Vitamin D 25 Hydroxy    Urinalysis with Reflex to Culture    TSH         Follow-up:  Return in about 1 year (around 3/15/2024) for have labs done prior to appt. PATIENT INSTRUCTIONS:  Patient Instructions   1. Cerebral artery aneurysm stable continue Keppra  2. Hypertension good control with current meds no changes  3. Hypothyroidism stable current dose of 50 mcg daily  4. Hyperlipidemia restart the lipitor you have at home   Electronically signed by MICHAEL Guzman on 3/15/2023 at 2:01 PM    @    [employer]/transcription disclaimer:  Much of this encounter note is electronic transcription/translation of spoken language to printed texts. The electronic translation of spoken language may be erroneous, or at times,nonsensical words or phrases may be inadvertently transcribed.   Although I have reviewed the note for such errors, some may still exist.

## 2023-03-16 ENCOUNTER — OFFICE VISIT (OUTPATIENT)
Dept: NEUROLOGY | Facility: CLINIC | Age: 53
End: 2023-03-16
Payer: COMMERCIAL

## 2023-03-16 VITALS
DIASTOLIC BLOOD PRESSURE: 78 MMHG | RESPIRATION RATE: 18 BRPM | HEIGHT: 70 IN | SYSTOLIC BLOOD PRESSURE: 128 MMHG | BODY MASS INDEX: 27.49 KG/M2 | HEART RATE: 74 BPM | WEIGHT: 192 LBS

## 2023-03-16 DIAGNOSIS — R56.9 GENERALIZED CONVULSIVE SEIZURES: ICD-10-CM

## 2023-03-16 PROCEDURE — 99213 OFFICE O/P EST LOW 20 MIN: CPT | Performed by: PSYCHIATRY & NEUROLOGY

## 2023-03-16 RX ORDER — MULTIPLE VITAMINS W/ MINERALS TAB 9MG-400MCG
1 TAB ORAL DAILY
COMMUNITY

## 2023-03-16 RX ORDER — VITAMIN B COMPLEX
CAPSULE ORAL DAILY
COMMUNITY

## 2023-03-16 RX ORDER — LEVETIRACETAM 250 MG/1
250 TABLET ORAL NIGHTLY
Qty: 30 TABLET | Refills: 5 | Status: SHIPPED | OUTPATIENT
Start: 2023-03-16

## 2023-03-16 RX ORDER — LEVETIRACETAM 500 MG/1
500 TABLET ORAL 2 TIMES DAILY
Qty: 180 TABLET | Refills: 1 | Status: SHIPPED | OUTPATIENT
Start: 2023-03-16

## 2023-03-16 RX ORDER — SIMVASTATIN 40 MG
40 TABLET ORAL NIGHTLY
COMMUNITY

## 2023-03-16 NOTE — PATIENT INSTRUCTIONS
Patient to continue driving and seizure precautions as discussed.  Patient to have caution climbing and using sharp cutting tools and working around hot fire/stove/grill/water.  Patient to take showers not baths /patient not in hot tub or swim by self.  Patient not to drive for at least 2 days after increasing dosage of Keppra.  Patient to get to emergency room immediately if seizure or stroke symptoms occur.

## 2023-03-16 NOTE — PROGRESS NOTES
Subjective   Nathaniel Troy, 1970, is a male who is being seen today for   Chief Complaint   Patient presents with   • Seizures   • Stroke       HISTORY OF PRESENT ILLNESS: Patient seen for history of stroke and generalized seizure.  Patient has not had any stroke symptoms or seizure symptoms since last seen and denies any headaches.  Patient says he is stable after his dominant CVA with hemorrhagic component.  However he was put on Lyrica and switch to Keppra prophylactically after having side effects from Dilantin.  Now his blood work is normalized other than the Keppra level which is down to 6.  We are going to add another to 250 mg dosage of Keppra at night and have him repeat a Keppra level in 2 weeks.  His platelet level has not normalized.  His potassium was slightly elevated at 5.1 but he has been using no salt supplement.  With his previous hemorrhagic component we are not using antiplatelet treatment for stroke prevention the patient is to get to emergency room immediately if he has any stroke symptoms or signs/ seizure symptoms.  He does take statin.  Driving and seizure precautions were reviewed again in detail.    REVIEW OF SYSTEMS:   GENERAL: Blood pressure today as 130/80 left arm seated and 128/70 left arm standing a pulse 74  PULMONARY: No increased respiratory difficulty  CVS: No chest pain or palpitation  GASTROINTESTINAL: No acute GI distress  GENITOURINARY: No acute  distress    GYN: Not applicable  MUSCULOSKELETAL: No acute musculoskeletal symptoms  HEENT: No acute vision or hearing change  ENDOCRINE: Not diabetic  PSYCHIATRIC: No acute psychiatric symptoms  HEMATOLOGY: As above  SKIN: No acute skin changes  Family history reviewed and otherwise noncontributory.  Social history: Patient denies smoking or drug or alcohol use    PHYSICAL EXAMINATION:    GENERAL: No acute distress except for some intentional tremor  CRANIUM: Normal cephalic/atraumatic and not having any headaches  HEENT:        EYES: EOMs intact without nystagmus and fields full to confrontation.  No acute fundic abnormalities.  Pupils equal round reactive to light.       EARS: Tympanic membranes normal and hears tuning fork bilaterally       THROAT: No acute oropharynx abnormalities and no swallowing difficulties by history       NECK: No bruits/no lymphadenopathy  CHEST: No acute cardiopulmonary abnormalities by auscultation  ABDOMEN: Nondistended    NEURO: Patient alert and follows commands without difficulty  SPEECH: Normal    CRANIAL NERVES: Motor/sensory about the face normal symmetric    MOTOR STRENGTH: Motor strength upper and lower extremities normal  STATION AND GAIT: Gait normal/Romberg negative  CEREBELLAR: Finger-nose and heel-to-shin normal  SENSORY: Pin and vibration upper and lower extremities normal  REFLEXES: Reflexes present and symmetric upper and lower extremities without clonus or Babinski      ASSESSMENT AND PLAN: Patient with a history of focal and secondary generalized seizures after history of stroke with hemorrhagic component.  Increasing dose of Keppra and repeating Keppra level as above.  The difference in his levels may be related to the generic levetiracetam.  I spent 25 minutes with this patient with counseling exam and review of records.  Patient BMI in normal range for age.  Patient to get to emergency room immediately if stroke symptoms or seizure occur      Diagnoses and all orders for this visit:    1. Generalized convulsive seizures (HCC)  -     levETIRAcetam (KEPPRA) 500 MG tablet; Take 1 tablet by mouth 2 (Two) Times a Day.  Dispense: 180 tablet; Refill: 1    Other orders  -     levETIRAcetam (KEPPRA) 250 MG tablet; Take 1 tablet by mouth Every Night.  Dispense: 30 tablet; Refill: 5        Dictated utilizing Dragon voice recognition software

## 2023-03-17 ENCOUNTER — TELEPHONE (OUTPATIENT)
Dept: NEUROLOGY | Facility: CLINIC | Age: 53
End: 2023-03-17
Payer: COMMERCIAL

## 2023-03-17 NOTE — TELEPHONE ENCOUNTER
Provider: DR RUBI  Caller: PATIENT  Relationship to Patient: SELF  Pharmacy: [id number]  Phone Number: [phone]  Reason for Call: PATIENT WOULD LIKE A CALL FROM DAVID TO DISCUSS WETHER HE SHOULD TAKE THE GENERIC OF THE LYRICA, PREGABALIN, INSTEAD OF KEPPRA. PLEASE CALL AND ADVISE. THANK YOU.

## 2023-03-17 NOTE — TELEPHONE ENCOUNTER
Explained that I talked to Gracie, he needs to take Keppra.  I also read Dr. Karimi note, he is to increase Keppra.

## 2023-03-20 RX ORDER — LEVETIRACETAM 250 MG/1
250 TABLET ORAL NIGHTLY
Qty: 90 TABLET | Refills: 3 | Status: SHIPPED | OUTPATIENT
Start: 2023-03-20

## 2023-05-13 DIAGNOSIS — I10 ESSENTIAL HYPERTENSION: ICD-10-CM

## 2023-05-13 DIAGNOSIS — E03.9 HYPOTHYROIDISM, UNSPECIFIED TYPE: ICD-10-CM

## 2023-05-15 RX ORDER — LABETALOL 200 MG/1
TABLET, FILM COATED ORAL
Qty: 90 TABLET | Refills: 1 | Status: SHIPPED | OUTPATIENT
Start: 2023-05-15

## 2023-05-15 RX ORDER — LEVOTHYROXINE SODIUM 0.05 MG/1
TABLET ORAL
Qty: 90 TABLET | Refills: 1 | Status: SHIPPED | OUTPATIENT
Start: 2023-05-15

## 2023-05-15 RX ORDER — ENALAPRIL MALEATE 10 MG/1
TABLET ORAL
Qty: 180 TABLET | Refills: 1 | Status: SHIPPED | OUTPATIENT
Start: 2023-05-15

## 2023-05-15 NOTE — TELEPHONE ENCOUNTER
Kishore called requesting a refill of the below medication which has been pended for you:     Requested Prescriptions     Pending Prescriptions Disp Refills    labetalol (NORMODYNE) 200 MG tablet [Pharmacy Med Name: LABETALOL  MG TABLET] 90 tablet 1     Sig: TAKE 1 TABLET BY MOUTH EVERY DAY    levothyroxine (SYNTHROID) 50 MCG tablet [Pharmacy Med Name: LEVOTHYROXINE 50 MCG TABLET] 90 tablet 1     Sig: TAKE 1 TABLET BY MOUTH EVERY DAY    enalapril (VASOTEC) 10 MG tablet [Pharmacy Med Name: ENALAPRIL MALEATE 10 MG TAB] 180 tablet 1     Sig: TAKE 1 TABLET BY MOUTH TWICE A DAY       Last Appointment Date: 3/15/2023  Next Appointment Date: 9/18/2023    Allergies   Allergen Reactions    Dilantin [Phenytoin]     Phenytoin      rash

## 2023-06-08 LAB — LEVETIRACETAM SERPL-MCNC: 17 UG/ML (ref 10–40)

## 2023-06-15 ENCOUNTER — DOCUMENTATION (OUTPATIENT)
Dept: NEUROLOGY | Facility: CLINIC | Age: 53
End: 2023-06-15
Payer: COMMERCIAL

## 2023-06-16 ENCOUNTER — OFFICE VISIT (OUTPATIENT)
Dept: NEUROLOGY | Facility: CLINIC | Age: 53
End: 2023-06-16
Payer: COMMERCIAL

## 2023-06-16 VITALS
HEIGHT: 70 IN | RESPIRATION RATE: 18 BRPM | DIASTOLIC BLOOD PRESSURE: 80 MMHG | BODY MASS INDEX: 27.35 KG/M2 | WEIGHT: 191 LBS | SYSTOLIC BLOOD PRESSURE: 144 MMHG | HEART RATE: 74 BPM

## 2023-06-16 DIAGNOSIS — R56.9 GENERALIZED CONVULSIVE SEIZURES: ICD-10-CM

## 2023-06-16 DIAGNOSIS — Z86.73 HISTORY OF STROKE: Primary | ICD-10-CM

## 2023-06-16 RX ORDER — LEVETIRACETAM 250 MG/1
250 TABLET ORAL NIGHTLY
Qty: 90 TABLET | Refills: 1 | Status: SHIPPED | OUTPATIENT
Start: 2023-06-16

## 2023-06-16 RX ORDER — LEVETIRACETAM 500 MG/1
500 TABLET ORAL 2 TIMES DAILY
Qty: 180 TABLET | Refills: 1 | Status: SHIPPED | OUTPATIENT
Start: 2023-06-16

## 2023-06-16 NOTE — PROGRESS NOTES
Subjective   Nathaniel Troy, 1970, is a male who is being seen today for   Chief Complaint   Patient presents with    Stroke    Seizures       HISTORY OF PRESENT ILLNESS: Patient seen with a history of stroke and generalized seizures.  Patient's Keppra was increased to 500 mg p.o. every morning and 750 p.o. nightly.  Patient's last Keppra level in therapeutic range at 17.  Previous blood work essentially showing no clinical significance.  Borderline elevation of potassium.  Patient being followed by PCP.  Patient has not had any stroke symptoms or seizures since last seen.  Patient to have seizure precautions as discussed.  Patient to get to emergency room immediately if seizure or stroke symptoms occur.  Patient recently had a decrease statin dosage.  Patient denies any headaches.  Patient says that sometimes he gets some hypersensitivity on his right side since the stroke    REVIEW OF SYSTEMS:   GENERAL: Blood pressure 140/72 left arm seated 144/80 left arm standing with pulse 74.  PULMONARY: No acute respiratory difficulty  CVS: No acute chest pain or palpitation  GASTROINTESTINAL: No acute GI distress  GENITOURINARY: No acute  distress  GYN: Not applicable  MUSCULOSKELETAL: No acute musculoskeletal symptoms  HEENT: No acute vision or hearing change  ENDOCRINE: Not diabetic  PSYCHIATRIC: No acute psychiatric symptoms  HEMATOLOGY: Not anemic  SKIN: No acute skin changes  Family history reviewed and otherwise noncontributory  Social history: Patient denies smoking or drug or alcohol use    PHYSICAL EXAMINATION:    GENERAL: No acute distress  CRANIUM: Normal cephalic/atraumatic  HEENT:       EYES: EOMs intact without nystagmus and fields full to confrontation.  Pupils equal round reactive to light.  No acute fundic abnormalities.       EARS: Tympanic membranes normal and hears tuning fork bilaterally       THROAT: No acute oropharynx abnormalities       NECK: No bruits/no lymphadenopathy  CHEST: No acute  cardiopulmonary abnormalities by auscultation  ABDOMEN: Nondistended  EXTREMITIES: Dorsalis pedis pulse symmetrical  NEURO: Patient alert and follows commands without difficulty  SPEECH: Normal    CRANIAL NERVES: Motor or sensory about the face normal symmetric    MOTOR STRENGTH: Motor strength upper and lower extremities normal  STATION AND GAIT: Gait normal/Romberg negative  CEREBELLAR: Finger-nose and heel-to-shin normal  SENSORY: Pin and vibration upper and lower extremities normal  REFLEXES: Reflexes present symmetric upper and lower EXTR without clonus or Babinski      ASSESSMENT AND PLAN: Patient with a history of hemorrhagic stroke and seizures stable on the Keppra dosages as of present.  Patient to continue seizure precautions and get to the emergency room immediately if stroke symptoms or seizure occurs.  BMI in normal range for age.  I spent 25 minutes with this patient with counseling/exam and review of records.      Diagnoses and all orders for this visit:    1. History of stroke (Primary)    2. Generalized convulsive seizures  -     levETIRAcetam (KEPPRA) 500 MG tablet; Take 1 tablet by mouth 2 (Two) Times a Day.  Dispense: 180 tablet; Refill: 1    Other orders  -     levETIRAcetam (KEPPRA) 250 MG tablet; Take 1 tablet by mouth Every Night.  Dispense: 90 tablet; Refill: 1        Dictated utilizing Dragon voice recognition software

## 2023-07-17 ENCOUNTER — TELEPHONE (OUTPATIENT)
Dept: NEUROLOGY | Facility: CLINIC | Age: 53
End: 2023-07-17

## 2023-07-17 NOTE — TELEPHONE ENCOUNTER
"  Caller: Lesley Briscoe \"lesley briscoe\"    Relationship: Self    Best call back number: [phone]    Who are you requesting to speak with (clinical staff, provider,  specific staff member): DAVID    What was the call regarding: PATIENT IS WANTING DAVID TO CALL RE: DR RUBI RETIREMENTMENT       "

## 2023-07-26 NOTE — PATIENT INSTRUCTIONS
Patient to continue driving/safety/seizure precautions as discussed.  Patient to get to the hospital emergency room immediately if seizure or stroke symptoms occur.  Patient to take showers not baths and not swim by self or get in hot tub by self.  Patient to have caution work around hot fire/stove/grill/water.  Patient to have caution climbing and using sharp cutting tools   Ketoconazole Counseling:   Patient counseled regarding improving absorption with orange juice.  Adverse effects include but are not limited to breast enlargement, headache, diarrhea, nausea, upset stomach, liver function test abnormalities, taste disturbance, and stomach pain.  There is a rare possibility of liver failure that can occur when taking ketoconazole. The patient understands that monitoring of LFTs may be required, especially at baseline. The patient verbalized understanding of the proper use and possible adverse effects of ketoconazole.  All of the patient's questions and concerns were addressed.

## 2023-09-29 DIAGNOSIS — I10 ESSENTIAL HYPERTENSION: ICD-10-CM

## 2023-09-29 NOTE — TELEPHONE ENCOUNTER
Kishore called requesting a refill of the below medication which has been pended for you:     Requested Prescriptions     Pending Prescriptions Disp Refills    enalapril (VASOTEC) 10 MG tablet [Pharmacy Med Name: ENALAPRIL MALEATE 10 MG TAB] 180 tablet 1     Sig: TAKE 1 TABLET BY MOUTH TWICE A DAY       Last Appointment Date: 3/15/2023  Next Appointment Date: 10/23/2023    Allergies   Allergen Reactions    Dilantin [Phenytoin]     Phenytoin      rash

## 2023-10-02 RX ORDER — ENALAPRIL MALEATE 10 MG/1
TABLET ORAL
Qty: 180 TABLET | Refills: 1 | Status: SHIPPED | OUTPATIENT
Start: 2023-10-02

## 2023-11-15 ENCOUNTER — TELEPHONE (OUTPATIENT)
Dept: NEUROLOGY | Facility: CLINIC | Age: 53
End: 2023-11-15
Payer: COMMERCIAL

## 2023-11-15 NOTE — TELEPHONE ENCOUNTER
Provider: TITI LACKEY    Caller: PATIENT    Relationship to Patient: SELF    Phone Number: [phone]    Reason for Call: PATIENT IS REQUESTING LAB ORDERS TO BE PLACED FOR HIS VISIT NEXT MONTH. PLEASE REVIEW, THANK YOU.

## 2023-11-21 DIAGNOSIS — R56.9 GENERALIZED CONVULSIVE SEIZURES: Primary | ICD-10-CM

## 2023-12-01 DIAGNOSIS — E03.9 HYPOTHYROIDISM, UNSPECIFIED TYPE: ICD-10-CM

## 2023-12-01 NOTE — TELEPHONE ENCOUNTER
Kishore called requesting a refill of the below medication which has been pended for you:     Requested Prescriptions     Pending Prescriptions Disp Refills    levothyroxine (SYNTHROID) 50 MCG tablet [Pharmacy Med Name: LEVOTHYROXINE 50 MCG TABLET] 90 tablet 1     Sig: TAKE 1 TABLET BY MOUTH EVERY DAY       Last Appointment Date: 3/15/2023  Next Appointment Date: 12/19/2023    Allergies   Allergen Reactions    Dilantin [Phenytoin]     Phenytoin      rash

## 2023-12-04 RX ORDER — LEVOTHYROXINE SODIUM 0.05 MG/1
TABLET ORAL
Qty: 90 TABLET | Refills: 1 | Status: SHIPPED | OUTPATIENT
Start: 2023-12-04

## 2023-12-18 DIAGNOSIS — Z12.5 ENCOUNTER FOR PROSTATE CANCER SCREENING: ICD-10-CM

## 2023-12-18 DIAGNOSIS — I10 PRIMARY HYPERTENSION: ICD-10-CM

## 2023-12-18 DIAGNOSIS — E78.2 MIXED HYPERLIPIDEMIA: ICD-10-CM

## 2023-12-18 DIAGNOSIS — E55.9 VITAMIN D DEFICIENCY: ICD-10-CM

## 2023-12-18 LAB
25(OH)D3 SERPL-MCNC: 30 NG/ML
ALBUMIN SERPL-MCNC: 4.6 G/DL (ref 3.5–5.2)
ALP SERPL-CCNC: 81 U/L (ref 40–130)
ALT SERPL-CCNC: 31 U/L (ref 5–41)
ANION GAP SERPL CALCULATED.3IONS-SCNC: 11 MMOL/L (ref 7–19)
AST SERPL-CCNC: 28 U/L (ref 5–40)
BASOPHILS # BLD: 0.1 K/UL (ref 0–0.2)
BASOPHILS NFR BLD: 1.6 % (ref 0–1)
BILIRUB SERPL-MCNC: 0.6 MG/DL (ref 0.2–1.2)
BILIRUB UR QL STRIP: NEGATIVE
BUN SERPL-MCNC: 16 MG/DL (ref 6–20)
CALCIUM SERPL-MCNC: 9.9 MG/DL (ref 8.6–10)
CHLORIDE SERPL-SCNC: 106 MMOL/L (ref 98–111)
CHOLEST SERPL-MCNC: 173 MG/DL (ref 160–199)
CLARITY UR: CLEAR
CO2 SERPL-SCNC: 28 MMOL/L (ref 22–29)
COLOR UR: YELLOW
CREAT SERPL-MCNC: 0.9 MG/DL (ref 0.5–1.2)
EOSINOPHIL # BLD: 0.4 K/UL (ref 0–0.6)
EOSINOPHIL NFR BLD: 6.7 % (ref 0–5)
ERYTHROCYTE [DISTWIDTH] IN BLOOD BY AUTOMATED COUNT: 13.5 % (ref 11.5–14.5)
GLUCOSE SERPL-MCNC: 97 MG/DL (ref 74–109)
GLUCOSE UR STRIP.AUTO-MCNC: NEGATIVE MG/DL
HCT VFR BLD AUTO: 46.7 % (ref 42–52)
HDLC SERPL-MCNC: 58 MG/DL (ref 55–121)
HGB BLD-MCNC: 15.5 G/DL (ref 14–18)
HGB UR STRIP.AUTO-MCNC: NEGATIVE MG/L
IMM GRANULOCYTES # BLD: 0 K/UL
KETONES UR STRIP.AUTO-MCNC: NEGATIVE MG/DL
LDLC SERPL CALC-MCNC: 92 MG/DL
LYMPHOCYTES # BLD: 2.1 K/UL (ref 1.1–4.5)
LYMPHOCYTES NFR BLD: 33.4 % (ref 20–40)
MCH RBC QN AUTO: 33.8 PG (ref 27–31)
MCHC RBC AUTO-ENTMCNC: 33.2 G/DL (ref 33–37)
MCV RBC AUTO: 102 FL (ref 80–94)
MONOCYTES # BLD: 0.7 K/UL (ref 0–0.9)
MONOCYTES NFR BLD: 10.2 % (ref 0–10)
NEUTROPHILS # BLD: 3.1 K/UL (ref 1.5–7.5)
NEUTS SEG NFR BLD: 47.8 % (ref 50–65)
NITRITE UR QL STRIP.AUTO: NEGATIVE
PH UR STRIP.AUTO: 5.5 [PH] (ref 5–8)
PMV BLD AUTO: 10.6 FL (ref 9.4–12.4)
POTASSIUM SERPL-SCNC: 4.6 MMOL/L (ref 3.5–5)
PROT SERPL-MCNC: 6.9 G/DL (ref 6.6–8.7)
PROT UR STRIP.AUTO-MCNC: NEGATIVE MG/DL
PSA SERPL-MCNC: 0.38 NG/ML (ref 0–4)
RBC # BLD AUTO: 4.58 M/UL (ref 4.7–6.1)
SODIUM SERPL-SCNC: 145 MMOL/L (ref 136–145)
SP GR UR STRIP.AUTO: 1.02 (ref 1–1.03)
TRIGL SERPL-MCNC: 116 MG/DL (ref 0–149)
TSH SERPL DL<=0.005 MIU/L-ACNC: 5.44 UIU/ML (ref 0.27–4.2)
UROBILINOGEN UR STRIP.AUTO-MCNC: 0.2 E.U./DL
WBC # BLD AUTO: 6.4 K/UL (ref 4.8–10.8)

## 2023-12-19 PROBLEM — E55.9 VITAMIN D DEFICIENCY: Status: ACTIVE | Noted: 2023-12-19

## 2023-12-21 DIAGNOSIS — R56.9 GENERALIZED CONVULSIVE SEIZURES: ICD-10-CM

## 2023-12-27 ENCOUNTER — TELEPHONE (OUTPATIENT)
Dept: NEUROLOGY | Facility: CLINIC | Age: 53
End: 2023-12-27
Payer: COMMERCIAL

## 2023-12-27 NOTE — TELEPHONE ENCOUNTER
Provider: TITI    Caller: PATIENT    Relationship to Patient: SELF    Phone Number: [phone]    Reason for Call: PT HAD LAB WORK DONE AT [facility] ON 12/18/23 AND WAS JUST CHECKING TO MAKE SURE THE OFFICE RECEIVED THOSE RESULTS BEFORE HIS APPT ON 1/10/23.  PT HAD UA, TSH, VITAMIN D, LIPID PANEL, CMP, CBC AND A PSA.      PLEASE CALL PT TO ADVISE.      THANK YOU

## 2024-01-08 ENCOUNTER — TELEPHONE (OUTPATIENT)
Dept: NEUROLOGY | Facility: CLINIC | Age: 54
End: 2024-01-08
Payer: COMMERCIAL

## 2024-01-12 ENCOUNTER — TELEPHONE (OUTPATIENT)
Dept: NEUROLOGY | Facility: CLINIC | Age: 54
End: 2024-01-12
Payer: COMMERCIAL

## 2024-01-12 DIAGNOSIS — I10 ESSENTIAL HYPERTENSION: ICD-10-CM

## 2024-01-15 ENCOUNTER — TELEPHONE (OUTPATIENT)
Dept: NEUROLOGY | Facility: CLINIC | Age: 54
End: 2024-01-15

## 2024-01-15 ENCOUNTER — TELEMEDICINE (OUTPATIENT)
Dept: NEUROLOGY | Facility: CLINIC | Age: 54
End: 2024-01-15
Payer: COMMERCIAL

## 2024-01-15 VITALS — HEIGHT: 70 IN | BODY MASS INDEX: 27.35 KG/M2 | WEIGHT: 191 LBS

## 2024-01-15 DIAGNOSIS — Z86.73 HISTORY OF STROKE: Primary | ICD-10-CM

## 2024-01-15 DIAGNOSIS — R56.9 GENERALIZED CONVULSIVE SEIZURES: ICD-10-CM

## 2024-01-15 PROCEDURE — 99214 OFFICE O/P EST MOD 30 MIN: CPT

## 2024-01-15 RX ORDER — ERGOCALCIFEROL 1.25 MG/1
1 CAPSULE ORAL WEEKLY
COMMUNITY
Start: 2023-12-19

## 2024-01-15 RX ORDER — LEVOTHYROXINE SODIUM 0.05 MG/1
2 TABLET ORAL DAILY
COMMUNITY
Start: 2023-12-04

## 2024-01-15 RX ORDER — LABETALOL 200 MG/1
TABLET, FILM COATED ORAL
Qty: 90 TABLET | Refills: 1 | Status: SHIPPED | OUTPATIENT
Start: 2024-01-15

## 2024-01-15 NOTE — PROGRESS NOTES
Neurology Consult Note    [facility]  [address], [address]  [place] [address]  Phone: [phone]  Fax: [phone]    Referring Provider:   No ref. provider found  Primary Care Provider:  Maranda Denny APRN    Reason for Consult:  Seizure   Stroke    Subjective      Nathaniel Troy presents to [facility]    History of Present Illness  53-year-old male seen for follow-up of stroke and seizure.  Patient had a stroke in 2005.  Etiology behind that was thought to be AVM.  Patient is under the care of Dr. Man of neurosurgery.  Patient is been following with Dr. Karimi since as well.  Last saw Dr. Karimi in clinic on June 2023.  At that time he was continued on Keppra 500 mg in the morning 750 mg at night.  He is not maintained on antithrombotic therapy due to hemorrhagic stroke.    Today patient was seen via video visit.  Patient was at home I was in my office.  Again he reports no new symptoms or seizures.  He is tolerating Keppra well.  No concerns.  There is no problem list on file for this patient.       Past Medical History:   Diagnosis Date    Allergy     Hyperlipidemia     Hypertension         Social History     Socioeconomic History    Marital status: Single   Tobacco Use    Smoking status: Never    Smokeless tobacco: Never   Vaping Use    Vaping Use: Never used   Substance and Sexual Activity    Alcohol use: No    Drug use: No    Sexual activity: Defer        Allergies   Allergen Reactions    Dilantin [Phenytoin]      rash          Current Outpatient Medications:     B Complex Vitamins (vitamin b complex) capsule capsule, Take  by mouth Daily., Disp: , Rfl:     enalapril (VASOTEC) 10 MG tablet, Take 1 tablet by mouth 2 (Two) Times a Day., Disp: , Rfl: 3    labetalol (NORMODYNE) 200 MG tablet, TAKE 1 TABLET BY MOUTH EVERY DAY, Disp: , Rfl: 5    levETIRAcetam (KEPPRA) 250 MG tablet, Take 1 tablet by mouth Every Night., Disp: 90 tablet, Rfl: 1     "levETIRAcetam (KEPPRA) 500 MG tablet, Take 1 tablet by mouth 2 (Two) Times a Day., Disp: 180 tablet, Rfl: 1    levothyroxine (SYNTHROID, LEVOTHROID) 50 MCG tablet, Take 1 tablet by mouth Daily., Disp: , Rfl:     levothyroxine (SYNTHROID, LEVOTHROID) 50 MCG tablet, Take 2 tablets by mouth Daily. MONDAY AND FRIDAYS ONLY, Disp: , Rfl:     multivitamin with minerals tablet tablet, Take 1 tablet by mouth Daily., Disp: , Rfl:     simvastatin (ZOCOR) 40 MG tablet, Take 0.5 tablets by mouth Every Night. Taking one half tab po daily, Disp: , Rfl:     vitamin D (ERGOCALCIFEROL) 1.25 MG (90003 UT) capsule capsule, Take 1 capsule by mouth 1 (One) Time Per Week., Disp: , Rfl:        Objective   Vital Signs:   Ht 177.8 cm (70\")   Wt 86.6 kg (191 lb)   BMI 27.41 kg/m²       Physical Exam  Vitals and nursing note reviewed.   Constitutional:       Appearance: Normal appearance.   Pulmonary:      Effort: Pulmonary effort is normal. No respiratory distress.   Neurological:      Mental Status: He is alert.   Psychiatric:         Speech: Speech normal.        Neurological Exam  Mental Status  Alert. Oriented to person, place, time and situation. Speech is normal. Language is fluent with no aphasia.    Cranial Nerves  CN VII: Full and symmetric facial movement.    Motor    Moves all extremities equally..      Result Review :   The following data was reviewed by: SHREYA Duncan on 01/15/2024:    CBC w/diff          3/14/2023    10:39 12/18/2023    08:41   CBC w/Diff   WBC 6.5     6.4       RBC 5.04     4.58       Hemoglobin 16.4     15.5       Hematocrit 49.6     46.7       MCV 98.4     102.0       MCH 32.5     33.8       MCHC 33.1     33.2       RDW 13.1     13.5       Platelets 184     182       Neutrophil Rel % 45.7     47.8       Lymphocyte Rel % 33.5     33.4       Monocyte Rel % 11.9     10.2       Eosinophil Rel % 6.6     6.7       Basophil Rel % 2.0     1.6          Details          This result is from an external " source.             Lipid Panel          3/14/2023    10:39 12/18/2023    08:41   Lipid Panel   Total Cholesterol 198     173       Triglycerides 122     116       HDL Cholesterol 46     58       LDL Cholesterol  128     92          Details          This result is from an external source.             TSH          12/18/2023    08:41   TSH   TSH 5.440          Details          This result is from an external source.                               Impression:  Nathaniel Troy is a 53 y.o. male who presents for follow-up of stroke and seizure.  Patient has no concerns.  No seizures.  We will continue Keppra 500 mg in the mornings and 50 mg at night.  Additionally due to hemorrhagic stroke, patient has not been on antithrombotic therapy.  I would recommend at least ensuring secondary stroke related measures are in place.  As we outlined below.    Diagnoses and all orders for this visit:    1. History of stroke (Primary)    2. Generalized convulsive seizures        Plan:  Continue Keppra 500 mg in the morning as well as 750 mg at night.  Continue some Avastin 20 mg nightly  Continue deferring use of aspirin as antithrombotic.  LDL goal less than 70.  Currently 92.  A1c goal less than 6.5.  BP goal less than 130/80.  Return to the emergency room for any new stroke symptoms  Seizure precautions  Follow-up with PCP as scheduled  Follow-up with our clinic in 1 year or sooner if needed    The patient and I have discussed the plan of care and he is in full agreement at this time.     Follow Up   Return in about 1 year (around 1/15/2025) for Stroke.            Quinn Conway, SHREYA  01/15/24  11:47 CST

## 2024-01-15 NOTE — TELEPHONE ENCOUNTER
Kishore called requesting a refill of the below medication which has been pended for you:     Requested Prescriptions     Pending Prescriptions Disp Refills    labetalol (NORMODYNE) 200 MG tablet [Pharmacy Med Name: LABETALOL  MG TABLET] 90 tablet 1     Sig: TAKE 1 TABLET BY MOUTH EVERY DAY       Last Appointment Date: 12/19/2023  Next Appointment Date: 6/19/2024    Allergies   Allergen Reactions    Dilantin [Phenytoin]     Phenytoin      rash

## 2024-06-25 SDOH — ECONOMIC STABILITY: FOOD INSECURITY: WITHIN THE PAST 12 MONTHS, YOU WORRIED THAT YOUR FOOD WOULD RUN OUT BEFORE YOU GOT MONEY TO BUY MORE.: NEVER TRUE

## 2024-06-25 SDOH — ECONOMIC STABILITY: INCOME INSECURITY: HOW HARD IS IT FOR YOU TO PAY FOR THE VERY BASICS LIKE FOOD, HOUSING, MEDICAL CARE, AND HEATING?: NOT HARD AT ALL

## 2024-06-25 SDOH — ECONOMIC STABILITY: TRANSPORTATION INSECURITY
IN THE PAST 12 MONTHS, HAS LACK OF TRANSPORTATION KEPT YOU FROM MEETINGS, WORK, OR FROM GETTING THINGS NEEDED FOR DAILY LIVING?: NO

## 2024-06-25 SDOH — ECONOMIC STABILITY: FOOD INSECURITY: WITHIN THE PAST 12 MONTHS, THE FOOD YOU BOUGHT JUST DIDN'T LAST AND YOU DIDN'T HAVE MONEY TO GET MORE.: NEVER TRUE

## 2024-06-25 ASSESSMENT — PATIENT HEALTH QUESTIONNAIRE - PHQ9
1. LITTLE INTEREST OR PLEASURE IN DOING THINGS: NOT AT ALL
2. FEELING DOWN, DEPRESSED OR HOPELESS: NOT AT ALL
SUM OF ALL RESPONSES TO PHQ QUESTIONS 1-9: 0
SUM OF ALL RESPONSES TO PHQ QUESTIONS 1-9: 0
SUM OF ALL RESPONSES TO PHQ9 QUESTIONS 1 & 2: 0
SUM OF ALL RESPONSES TO PHQ QUESTIONS 1-9: 0
SUM OF ALL RESPONSES TO PHQ QUESTIONS 1-9: 0

## 2024-06-26 DIAGNOSIS — I10 PRIMARY HYPERTENSION: ICD-10-CM

## 2024-06-26 DIAGNOSIS — E78.2 MIXED HYPERLIPIDEMIA: ICD-10-CM

## 2024-06-26 DIAGNOSIS — E55.9 VITAMIN D DEFICIENCY: ICD-10-CM

## 2024-06-26 LAB
25(OH)D3 SERPL-MCNC: 65.2 NG/ML
ALBUMIN SERPL-MCNC: 4.6 G/DL (ref 3.5–5.2)
ALP SERPL-CCNC: 128 U/L (ref 40–130)
ALT SERPL-CCNC: 175 U/L (ref 5–41)
ANION GAP SERPL CALCULATED.3IONS-SCNC: 14 MMOL/L (ref 7–19)
AST SERPL-CCNC: 128 U/L (ref 5–40)
BASOPHILS # BLD: 0.1 K/UL (ref 0–0.2)
BASOPHILS NFR BLD: 1.8 % (ref 0–1)
BILIRUB SERPL-MCNC: 0.4 MG/DL (ref 0.2–1.2)
BILIRUB UR QL STRIP: NEGATIVE
BUN SERPL-MCNC: 17 MG/DL (ref 6–20)
CALCIUM SERPL-MCNC: 9.3 MG/DL (ref 8.6–10)
CHLORIDE SERPL-SCNC: 106 MMOL/L (ref 98–111)
CHOLEST SERPL-MCNC: 196 MG/DL (ref 160–199)
CLARITY UR: CLEAR
CO2 SERPL-SCNC: 21 MMOL/L (ref 22–29)
COLOR UR: YELLOW
CREAT SERPL-MCNC: 1 MG/DL (ref 0.5–1.2)
EOSINOPHIL # BLD: 0.4 K/UL (ref 0–0.6)
EOSINOPHIL NFR BLD: 6.8 % (ref 0–5)
ERYTHROCYTE [DISTWIDTH] IN BLOOD BY AUTOMATED COUNT: 14.4 % (ref 11.5–14.5)
GLUCOSE SERPL-MCNC: 98 MG/DL (ref 74–109)
GLUCOSE UR STRIP.AUTO-MCNC: NEGATIVE MG/DL
HCT VFR BLD AUTO: 45.9 % (ref 42–52)
HDLC SERPL-MCNC: 74 MG/DL (ref 55–121)
HGB BLD-MCNC: 14.5 G/DL (ref 14–18)
HGB UR STRIP.AUTO-MCNC: NEGATIVE MG/L
IMM GRANULOCYTES # BLD: 0 K/UL
KETONES UR STRIP.AUTO-MCNC: NEGATIVE MG/DL
LDLC SERPL CALC-MCNC: 107 MG/DL
LEUKOCYTE ESTERASE UR QL STRIP.AUTO: NEGATIVE
LYMPHOCYTES # BLD: 2.1 K/UL (ref 1.1–4.5)
LYMPHOCYTES NFR BLD: 37.8 % (ref 20–40)
MCH RBC QN AUTO: 32.8 PG (ref 27–31)
MCHC RBC AUTO-ENTMCNC: 31.6 G/DL (ref 33–37)
MCV RBC AUTO: 103.8 FL (ref 80–94)
MONOCYTES # BLD: 0.9 K/UL (ref 0–0.9)
MONOCYTES NFR BLD: 16.2 % (ref 0–10)
NEUTROPHILS # BLD: 2 K/UL (ref 1.5–7.5)
NEUTS SEG NFR BLD: 36.7 % (ref 50–65)
NITRITE UR QL STRIP.AUTO: NEGATIVE
PH UR STRIP.AUTO: 5.5 [PH] (ref 5–8)
PLATELET # BLD AUTO: 196 K/UL (ref 130–400)
PMV BLD AUTO: 10.9 FL (ref 9.4–12.4)
POTASSIUM SERPL-SCNC: 4.4 MMOL/L (ref 3.5–5)
PROT SERPL-MCNC: 7.4 G/DL (ref 6.6–8.7)
PROT UR STRIP.AUTO-MCNC: NEGATIVE MG/DL
RBC # BLD AUTO: 4.42 M/UL (ref 4.7–6.1)
SODIUM SERPL-SCNC: 141 MMOL/L (ref 136–145)
SP GR UR STRIP.AUTO: 1.02 (ref 1–1.03)
TRIGL SERPL-MCNC: 74 MG/DL (ref 0–149)
TSH SERPL DL<=0.005 MIU/L-ACNC: 4.26 UIU/ML (ref 0.27–4.2)
UROBILINOGEN UR STRIP.AUTO-MCNC: 0.2 E.U./DL
WBC # BLD AUTO: 5.5 K/UL (ref 4.8–10.8)

## 2024-06-26 ASSESSMENT — PATIENT HEALTH QUESTIONNAIRE - PHQ9
2. FEELING DOWN, DEPRESSED OR HOPELESS: NOT AT ALL
1. LITTLE INTEREST OR PLEASURE IN DOING THINGS: NOT AT ALL
SUM OF ALL RESPONSES TO PHQ9 QUESTIONS 1 & 2: 0

## 2024-06-27 ENCOUNTER — OFFICE VISIT (OUTPATIENT)
Dept: INTERNAL MEDICINE | Age: 54
End: 2024-06-27
Payer: COMMERCIAL

## 2024-06-27 VITALS
WEIGHT: 178 LBS | OXYGEN SATURATION: 99 % | TEMPERATURE: 97.8 F | SYSTOLIC BLOOD PRESSURE: 128 MMHG | DIASTOLIC BLOOD PRESSURE: 72 MMHG | HEART RATE: 76 BPM | HEIGHT: 70 IN | BODY MASS INDEX: 25.48 KG/M2

## 2024-06-27 DIAGNOSIS — E78.2 MIXED HYPERLIPIDEMIA: ICD-10-CM

## 2024-06-27 DIAGNOSIS — R79.89 ELEVATED LIVER FUNCTION TESTS: ICD-10-CM

## 2024-06-27 DIAGNOSIS — K76.0 FATTY LIVER: ICD-10-CM

## 2024-06-27 DIAGNOSIS — E03.9 HYPOTHYROIDISM, UNSPECIFIED TYPE: ICD-10-CM

## 2024-06-27 DIAGNOSIS — I10 ESSENTIAL HYPERTENSION: ICD-10-CM

## 2024-06-27 DIAGNOSIS — E55.9 VITAMIN D DEFICIENCY: Primary | ICD-10-CM

## 2024-06-27 DIAGNOSIS — I63.50 CEREBRAL ARTERY OCCLUSION WITH CEREBRAL INFARCTION (HCC): ICD-10-CM

## 2024-06-27 PROCEDURE — 3074F SYST BP LT 130 MM HG: CPT | Performed by: NURSE PRACTITIONER

## 2024-06-27 PROCEDURE — 99214 OFFICE O/P EST MOD 30 MIN: CPT | Performed by: NURSE PRACTITIONER

## 2024-06-27 PROCEDURE — 3078F DIAST BP <80 MM HG: CPT | Performed by: NURSE PRACTITIONER

## 2024-06-27 RX ORDER — LEVOTHYROXINE SODIUM 0.05 MG/1
50 TABLET ORAL DAILY
Qty: 90 TABLET | Refills: 1 | Status: SHIPPED | OUTPATIENT
Start: 2024-06-27

## 2024-06-27 RX ORDER — LABETALOL 200 MG/1
200 TABLET, FILM COATED ORAL DAILY
Qty: 90 TABLET | Refills: 1 | Status: SHIPPED | OUTPATIENT
Start: 2024-06-27

## 2024-06-27 RX ORDER — SIMVASTATIN 40 MG
40 TABLET ORAL NIGHTLY
Qty: 90 TABLET | Refills: 3 | Status: SHIPPED | OUTPATIENT
Start: 2024-06-27

## 2024-06-27 SDOH — ECONOMIC STABILITY: INCOME INSECURITY: HOW HARD IS IT FOR YOU TO PAY FOR THE VERY BASICS LIKE FOOD, HOUSING, MEDICAL CARE, AND HEATING?: NOT VERY HARD

## 2024-06-27 SDOH — ECONOMIC STABILITY: FOOD INSECURITY: WITHIN THE PAST 12 MONTHS, YOU WORRIED THAT YOUR FOOD WOULD RUN OUT BEFORE YOU GOT MONEY TO BUY MORE.: NEVER TRUE

## 2024-06-27 SDOH — ECONOMIC STABILITY: FOOD INSECURITY: WITHIN THE PAST 12 MONTHS, THE FOOD YOU BOUGHT JUST DIDN'T LAST AND YOU DIDN'T HAVE MONEY TO GET MORE.: NEVER TRUE

## 2024-06-27 ASSESSMENT — ENCOUNTER SYMPTOMS
TROUBLE SWALLOWING: 0
SHORTNESS OF BREATH: 0
EYE ITCHING: 0
WHEEZING: 0
NAUSEA: 0
CHOKING: 0
VOMITING: 0
SORE THROAT: 0
CONSTIPATION: 0
DIARRHEA: 0
COLOR CHANGE: 0
ABDOMINAL DISTENTION: 0
ABDOMINAL PAIN: 0
STRIDOR: 0
EYE DISCHARGE: 0
BLOOD IN STOOL: 0
COUGH: 0

## 2024-06-27 NOTE — PROGRESS NOTES
128 122 125 130 138 108   DIASTOLIC 72 85 82 76 80 60   Pulse 76 61 98 67 60 60   Temp 97.8 °F (36.6 °C)  97.7 °F (36.5 °C) 97.1 °F (36.2 °C)     Respirations   18      SpO2 99 % 97 % 99 % 99 % 98 % 98 %   Weight - Scale 178 lb 186 lb 188 lb 181 lb 174 lb 185 lb   Height 5' 10\"  5' 10\"  5' 10\" 5' 10\"   Body Mass Index 25.54 kg/m2  26.97 kg/m2  24.96 kg/m2 26.54 kg/m2       No family history on file.    Social History     Tobacco Use    Smoking status: Never    Smokeless tobacco: Never   Substance Use Topics    Alcohol use: Never      Current Outpatient Medications   Medication Sig Dispense Refill    levothyroxine (SYNTHROID) 50 MCG tablet Take 1 tablet by mouth daily 90 tablet 1    labetalol (NORMODYNE) 200 MG tablet Take 1 tablet by mouth daily 90 tablet 1    simvastatin (ZOCOR) 40 MG tablet Take 1 tablet by mouth nightly 90 tablet 3    vitamin D (ERGOCALCIFEROL) 1.25 MG (34340 UT) CAPS capsule Take 1 capsule by mouth once a week 30 capsule 3    enalapril (VASOTEC) 10 MG tablet TAKE 1 TABLET BY MOUTH TWICE A  tablet 1    levETIRAcetam (KEPPRA) 250 MG tablet Take 1 tablet by mouth at bedtime 90 tablet 3    levETIRAcetam (KEPPRA) 500 MG tablet Take 1 tablet by mouth 2 times daily      Multiple Vitamins-Minerals (THERAPEUTIC MULTIVITAMIN-MINERALS) tablet Take 1 tablet by mouth daily      fluticasone (FLONASE) 50 MCG/ACT nasal spray 50 sprays       No current facility-administered medications for this visit.     Allergies   Allergen Reactions    Dilantin [Phenytoin]     Phenytoin      rash       Health Maintenance   Topic Date Due    COVID-19 Vaccine (1) Never done    Shingles vaccine (1 of 2) Never done    Hepatitis B vaccine (1 of 3 - 3-dose series) 12/19/2024 (Originally 1970)    Flu vaccine (Season Ended) 12/19/2024 (Originally 8/1/2024)    Pneumococcal 0-64 years Vaccine (2 of 2 - PCV) 12/15/2025 (Originally 12/19/2004)    Depression Screen  06/25/2025    DTaP/Tdap/Td vaccine (2 - Td or Tdap)

## 2024-08-26 RX ORDER — LEVETIRACETAM 250 MG/1
250 TABLET ORAL NIGHTLY
Qty: 90 TABLET | Refills: 1 | Status: SHIPPED | OUTPATIENT
Start: 2024-08-26

## 2024-08-30 NOTE — TELEPHONE ENCOUNTER
I did return Mr Troy's call. His new insurance requires a PA for his Lyrica.     ID# [id number]  [phone]   [employer]

## 2024-11-11 DIAGNOSIS — R56.9 GENERALIZED CONVULSIVE SEIZURES: ICD-10-CM

## 2024-11-11 RX ORDER — LEVETIRACETAM 500 MG/1
500 TABLET ORAL 2 TIMES DAILY
Qty: 180 TABLET | Refills: 0 | Status: SHIPPED | OUTPATIENT
Start: 2024-11-11

## 2024-11-11 NOTE — TELEPHONE ENCOUNTER
"  Caller: Lesley Briscoe \"lesley briscoe\"    Relationship: Self    Best call back number:   Telephone Information:   Mobile [phone]         Requested Prescriptions:   Requested Prescriptions     Pending Prescriptions Disp Refills    levETIRAcetam (KEPPRA) 500 MG tablet 180 tablet 1     Sig: Take 1 tablet by mouth 2 (Two) Times a Day.        Pharmacy where request should be sent: [facility] #[id number] - [facility] [place] - [address] - [phone] [facility] [phone]      Last office visit with prescribing clinician: Visit date not found   Last telemedicine visit with prescribing clinician: Visit date not found   Next office visit with prescribing clinician: 1/15/2025     Additional details provided by patient: PT HAS ABOUT A WEEKS WORTH LEFT    Does the patient have less than a 3 day supply:  [] Yes  [x] No    Would you like a call back once the refill request has been completed: [] Yes [x] No    If the office needs to give you a call back, can they leave a voicemail: [] Yes [x] No    Toby Arvizu Rep   11/11/24 08:42 CST     "

## 2025-01-17 DIAGNOSIS — I10 ESSENTIAL HYPERTENSION: ICD-10-CM

## 2025-01-20 RX ORDER — ENALAPRIL MALEATE 10 MG/1
TABLET ORAL
Qty: 60 TABLET | Refills: 0 | Status: SHIPPED | OUTPATIENT
Start: 2025-01-20

## 2025-01-20 NOTE — TELEPHONE ENCOUNTER
Kishore called requesting a refill of the below medication which has been pended for you:     Requested Prescriptions     Pending Prescriptions Disp Refills    enalapril (VASOTEC) 10 MG tablet 180 tablet 0     Sig: TAKE 1 TABLET BY MOUTH TWICE A DAY       Last Appointment Date: Visit date not found  Next Appointment Date: 1/30/2025    Allergies   Allergen Reactions    Dilantin [Phenytoin]     Phenytoin      rash

## 2025-02-06 DIAGNOSIS — R56.9 GENERALIZED CONVULSIVE SEIZURES: ICD-10-CM

## 2025-02-06 RX ORDER — LEVETIRACETAM 500 MG/1
500 TABLET ORAL 2 TIMES DAILY
Qty: 180 TABLET | Refills: 0 | Status: SHIPPED | OUTPATIENT
Start: 2025-02-06

## 2025-02-27 DIAGNOSIS — R56.9 GENERALIZED CONVULSIVE SEIZURES: Primary | ICD-10-CM

## 2025-02-27 RX ORDER — LEVETIRACETAM 250 MG/1
250 TABLET ORAL
Qty: 90 TABLET | Refills: 0 | Status: SHIPPED | OUTPATIENT
Start: 2025-02-27

## 2025-02-28 DIAGNOSIS — I10 ESSENTIAL HYPERTENSION: ICD-10-CM

## 2025-02-28 DIAGNOSIS — E78.2 MIXED HYPERLIPIDEMIA: ICD-10-CM

## 2025-02-28 DIAGNOSIS — E03.9 HYPOTHYROIDISM, UNSPECIFIED TYPE: ICD-10-CM

## 2025-02-28 LAB
25(OH)D3 SERPL-MCNC: 71.8 NG/ML
ALBUMIN SERPL-MCNC: 4.6 G/DL (ref 3.5–5.2)
ALP SERPL-CCNC: 85 U/L (ref 40–129)
ALT SERPL-CCNC: 44 U/L (ref 5–41)
ANION GAP SERPL CALCULATED.3IONS-SCNC: 14 MMOL/L (ref 8–16)
AST SERPL-CCNC: 42 U/L (ref 5–40)
BASOPHILS # BLD: 0.1 K/UL (ref 0–0.2)
BASOPHILS NFR BLD: 1.8 % (ref 0–1)
BILIRUB SERPL-MCNC: 0.8 MG/DL (ref 0.2–1.2)
BILIRUB UR QL STRIP: NEGATIVE
BUN SERPL-MCNC: 16 MG/DL (ref 6–20)
CALCIUM SERPL-MCNC: 9.6 MG/DL (ref 8.6–10)
CHLORIDE SERPL-SCNC: 100 MMOL/L (ref 98–107)
CHOLEST SERPL-MCNC: 204 MG/DL (ref 0–199)
CLARITY UR: CLEAR
CO2 SERPL-SCNC: 25 MMOL/L (ref 22–29)
COLOR UR: YELLOW
CREAT SERPL-MCNC: 1 MG/DL (ref 0.7–1.2)
EOSINOPHIL # BLD: 0.3 K/UL (ref 0–0.6)
EOSINOPHIL NFR BLD: 4.7 % (ref 0–5)
ERYTHROCYTE [DISTWIDTH] IN BLOOD BY AUTOMATED COUNT: 12.7 % (ref 11.5–14.5)
GLUCOSE SERPL-MCNC: 99 MG/DL (ref 70–99)
GLUCOSE UR STRIP.AUTO-MCNC: NEGATIVE MG/DL
HCT VFR BLD AUTO: 46.2 % (ref 42–52)
HDLC SERPL-MCNC: 57 MG/DL (ref 40–60)
HGB BLD-MCNC: 14.9 G/DL (ref 14–18)
HGB UR STRIP.AUTO-MCNC: NEGATIVE MG/L
IMM GRANULOCYTES # BLD: 0 K/UL
KETONES UR STRIP.AUTO-MCNC: NEGATIVE MG/DL
LDLC SERPL CALC-MCNC: 125 MG/DL
LEUKOCYTE ESTERASE UR QL STRIP.AUTO: NEGATIVE
LYMPHOCYTES # BLD: 1.9 K/UL (ref 1.1–4.5)
LYMPHOCYTES NFR BLD: 26.9 % (ref 20–40)
MCH RBC QN AUTO: 33 PG (ref 27–31)
MCHC RBC AUTO-ENTMCNC: 32.3 G/DL (ref 33–37)
MCV RBC AUTO: 102.2 FL (ref 80–94)
MONOCYTES # BLD: 1 K/UL (ref 0–0.9)
MONOCYTES NFR BLD: 13.3 % (ref 0–10)
NEUTROPHILS # BLD: 3.8 K/UL (ref 1.5–7.5)
NEUTS SEG NFR BLD: 52.7 % (ref 50–65)
NITRITE UR QL STRIP.AUTO: NEGATIVE
PH UR STRIP.AUTO: 6 [PH] (ref 5–8)
PLATELET # BLD AUTO: 294 K/UL (ref 130–400)
PMV BLD AUTO: 10.4 FL (ref 9.4–12.4)
POTASSIUM SERPL-SCNC: 5 MMOL/L (ref 3.5–5.1)
PROT SERPL-MCNC: 7.4 G/DL (ref 6.4–8.3)
PROT UR STRIP.AUTO-MCNC: NEGATIVE MG/DL
RBC # BLD AUTO: 4.52 M/UL (ref 4.7–6.1)
SODIUM SERPL-SCNC: 139 MMOL/L (ref 136–145)
SP GR UR STRIP.AUTO: 1.01 (ref 1–1.03)
TRIGL SERPL-MCNC: 109 MG/DL (ref 0–149)
TSH SERPL DL<=0.005 MIU/L-ACNC: 6.08 UIU/ML (ref 0.27–4.2)
UROBILINOGEN UR STRIP.AUTO-MCNC: 0.2 E.U./DL
WBC # BLD AUTO: 7.2 K/UL (ref 4.8–10.8)

## 2025-02-28 SDOH — HEALTH STABILITY: PHYSICAL HEALTH: ON AVERAGE, HOW MANY MINUTES DO YOU ENGAGE IN EXERCISE AT THIS LEVEL?: 30 MIN

## 2025-02-28 SDOH — HEALTH STABILITY: PHYSICAL HEALTH: ON AVERAGE, HOW MANY DAYS PER WEEK DO YOU ENGAGE IN MODERATE TO STRENUOUS EXERCISE (LIKE A BRISK WALK)?: 3 DAYS

## 2025-03-02 LAB — LEVETIRACETAM SERPL-MCNC: 12 UG/ML (ref 10–40)

## 2025-03-03 ENCOUNTER — OFFICE VISIT (OUTPATIENT)
Dept: INTERNAL MEDICINE | Age: 55
End: 2025-03-03
Payer: COMMERCIAL

## 2025-03-03 VITALS
BODY MASS INDEX: 26.2 KG/M2 | SYSTOLIC BLOOD PRESSURE: 130 MMHG | HEIGHT: 70 IN | TEMPERATURE: 98 F | WEIGHT: 183 LBS | OXYGEN SATURATION: 97 % | DIASTOLIC BLOOD PRESSURE: 88 MMHG | HEART RATE: 59 BPM

## 2025-03-03 DIAGNOSIS — I10 ESSENTIAL HYPERTENSION: ICD-10-CM

## 2025-03-03 DIAGNOSIS — E78.2 MIXED HYPERLIPIDEMIA: ICD-10-CM

## 2025-03-03 DIAGNOSIS — Z76.89 ENCOUNTER TO ESTABLISH CARE: Primary | ICD-10-CM

## 2025-03-03 DIAGNOSIS — E03.9 HYPOTHYROIDISM, UNSPECIFIED TYPE: ICD-10-CM

## 2025-03-03 DIAGNOSIS — E55.9 VITAMIN D DEFICIENCY: ICD-10-CM

## 2025-03-03 PROCEDURE — 3075F SYST BP GE 130 - 139MM HG: CPT | Performed by: NURSE PRACTITIONER

## 2025-03-03 PROCEDURE — 99214 OFFICE O/P EST MOD 30 MIN: CPT | Performed by: NURSE PRACTITIONER

## 2025-03-03 PROCEDURE — 3079F DIAST BP 80-89 MM HG: CPT | Performed by: NURSE PRACTITIONER

## 2025-03-03 RX ORDER — LABETALOL 200 MG/1
200 TABLET, FILM COATED ORAL DAILY
Qty: 90 TABLET | Refills: 1 | Status: SHIPPED | OUTPATIENT
Start: 2025-03-03

## 2025-03-03 RX ORDER — LEVOTHYROXINE SODIUM 75 UG/1
75 TABLET ORAL DAILY
Qty: 30 TABLET | Refills: 2 | Status: SHIPPED | OUTPATIENT
Start: 2025-03-03

## 2025-03-03 RX ORDER — ENALAPRIL MALEATE 10 MG/1
TABLET ORAL
Qty: 180 TABLET | Refills: 1 | Status: SHIPPED | OUTPATIENT
Start: 2025-03-03

## 2025-03-03 RX ORDER — SIMVASTATIN 40 MG
40 TABLET ORAL NIGHTLY
Qty: 90 TABLET | Refills: 3 | Status: SHIPPED | OUTPATIENT
Start: 2025-03-03

## 2025-03-03 SDOH — ECONOMIC STABILITY: FOOD INSECURITY: WITHIN THE PAST 12 MONTHS, THE FOOD YOU BOUGHT JUST DIDN'T LAST AND YOU DIDN'T HAVE MONEY TO GET MORE.: PATIENT DECLINED

## 2025-03-03 SDOH — ECONOMIC STABILITY: FOOD INSECURITY: WITHIN THE PAST 12 MONTHS, YOU WORRIED THAT YOUR FOOD WOULD RUN OUT BEFORE YOU GOT MONEY TO BUY MORE.: PATIENT DECLINED

## 2025-03-03 ASSESSMENT — ENCOUNTER SYMPTOMS
EYES NEGATIVE: 1
RESPIRATORY NEGATIVE: 1
GASTROINTESTINAL NEGATIVE: 1
ALLERGIC/IMMUNOLOGIC NEGATIVE: 1

## 2025-03-03 ASSESSMENT — PATIENT HEALTH QUESTIONNAIRE - PHQ9
1. LITTLE INTEREST OR PLEASURE IN DOING THINGS: NOT AT ALL
2. FEELING DOWN, DEPRESSED OR HOPELESS: NOT AT ALL
SUM OF ALL RESPONSES TO PHQ QUESTIONS 1-9: 0

## 2025-03-03 NOTE — PROGRESS NOTES
Encounter   Medications    levothyroxine (SYNTHROID) 75 MCG tablet     Sig: Take 1 tablet by mouth daily     Dispense:  30 tablet     Refill:  2    enalapril (VASOTEC) 10 MG tablet     Sig: TAKE 1 TABLET BY MOUTH TWICE A DAY     Dispense:  180 tablet     Refill:  1     Needs appt for further refills    labetalol (NORMODYNE) 200 MG tablet     Sig: Take 1 tablet by mouth daily     Dispense:  90 tablet     Refill:  1    simvastatin (ZOCOR) 40 MG tablet     Sig: Take 1 tablet by mouth nightly     Dispense:  90 tablet     Refill:  3         ORDERS:  Orders Placed This Encounter   Procedures    TSH    T4, Free    TSH    CBC with Auto Differential    Comprehensive Metabolic Panel    Lipid Panel    Vitamin D 25 Hydroxy       Follow-up:  Return in about 6 months (around 9/3/2025) for follow up with PCP with fasting labs.    PATIENT INSTRUCTIONS:  Patient Instructions   Repeat thyroid in 6 weeks  Follow up in 6 months with fasting labs  Electronically signed by MICHAEL Hoskins on 3/3/2025 at 3:03 PM    EMR Dragon/transcription disclaimer:  Much of thisencounter note is electronic transcription/translation of spoken language to printed texts.  The electronic translation of spoken language may be erroneous, or at times, nonsensical words or phrases may be inadvertentlytranscribed.  Although I have reviewed the note for such errors, some may still exist.

## 2025-03-04 ENCOUNTER — OFFICE VISIT (OUTPATIENT)
Dept: NEUROLOGY | Facility: CLINIC | Age: 55
End: 2025-03-04
Payer: COMMERCIAL

## 2025-03-04 VITALS
DIASTOLIC BLOOD PRESSURE: 66 MMHG | BODY MASS INDEX: 26.34 KG/M2 | HEIGHT: 70 IN | OXYGEN SATURATION: 95 % | SYSTOLIC BLOOD PRESSURE: 118 MMHG | WEIGHT: 184 LBS | HEART RATE: 65 BPM

## 2025-03-04 DIAGNOSIS — R56.9 GENERALIZED CONVULSIVE SEIZURES: Primary | ICD-10-CM

## 2025-03-04 DIAGNOSIS — Z86.73 HISTORY OF STROKE: ICD-10-CM

## 2025-03-04 RX ORDER — LEVOTHYROXINE SODIUM 75 UG/1
1 TABLET ORAL DAILY
COMMUNITY
Start: 2025-03-03

## 2025-03-04 RX ORDER — LEVETIRACETAM 500 MG/1
500 TABLET ORAL 2 TIMES DAILY
Qty: 180 TABLET | Refills: 3 | Status: SHIPPED | OUTPATIENT
Start: 2025-03-04 | End: 2026-03-04

## 2025-03-04 RX ORDER — LEVETIRACETAM 250 MG/1
250 TABLET ORAL
Qty: 90 TABLET | Refills: 3 | Status: SHIPPED | OUTPATIENT
Start: 2025-03-04 | End: 2026-03-04

## 2025-03-04 NOTE — PROGRESS NOTES
Neurology Consult Note    [facility]  [address], [address]  [place] [address]  Phone: [phone]  Fax: [phone]    Referring Provider:   No ref. provider found  Primary Care Provider:  Key Walden APRN    Reason for Consult:  History of Hemorrhagic Stroke  Seizures   Subjective        Nathaniel Troy presents to [facility]    History of Present Illness  55-year-old male seen for follow-up of history of stroke as well as generalized convulsive seizures.  Last seen in clinic by me on 1/15/2024.  Patient was a previous Dr. Edis Karimi patient.  Patient is managed on levetiracetam 500 mg the morning and 750 mg at night.  Patient did have a hemorrhagic stroke as the result of a ruptured AVM that occurred greater than 10 years ago.  Patient did experience focal and secondary generalized seizures at the time of rupture.    Today patient presents to the clinic by himself.  Reports to me no recurrent symptoms nor seizures since last being seen.  He is managed on levetiracetam still at 500 mg the morning and 750 mg at night.  He has no concerns today or needs.  There is no problem list on file for this patient.       Past Medical History:   Diagnosis Date    Allergy     Hyperlipidemia     Hypertension         Social History     Socioeconomic History    Marital status: Single   Tobacco Use    Smoking status: Never    Smokeless tobacco: Never   Vaping Use    Vaping status: Never Used   Substance and Sexual Activity    Alcohol use: No    Drug use: No    Sexual activity: Defer        Allergies   Allergen Reactions    Dilantin [Phenytoin]      rash          Current Outpatient Medications:     B Complex Vitamins (vitamin b complex) capsule capsule, Take  by mouth Daily., Disp: , Rfl:     enalapril (VASOTEC) 10 MG tablet, Take 1 tablet by mouth 2 (Two) Times a Day., Disp: , Rfl: 3    labetalol (NORMODYNE) 200 MG tablet, TAKE 1 TABLET BY MOUTH EVERY DAY, Disp: , Rfl: 5     "levETIRAcetam (KEPPRA) 250 MG tablet, Take 1 tablet by mouth every night at bedtime., Disp: 90 tablet, Rfl: 3    levETIRAcetam (KEPPRA) 500 MG tablet, Take 1 tablet by mouth 2 (Two) Times a Day., Disp: 180 tablet, Rfl: 3    levothyroxine (SYNTHROID, LEVOTHROID) 75 MCG tablet, Take 1 tablet by mouth Daily., Disp: , Rfl:     multivitamin with minerals tablet tablet, Take 1 tablet by mouth Daily., Disp: , Rfl:     simvastatin (ZOCOR) 40 MG tablet, Take 0.5 tablets by mouth Every Night. Taking one half tab po daily, Disp: , Rfl:     vitamin D (ERGOCALCIFEROL) 1.25 MG (08917 UT) capsule capsule, Take 1 capsule by mouth 1 (One) Time Per Week., Disp: , Rfl:        Objective   Vital Signs:   /66   Pulse 65   Ht 177.8 cm (70\")   Wt 83.5 kg (184 lb)   SpO2 95%   BMI 26.40 kg/m²       Physical Exam  Vitals and nursing note reviewed.   Constitutional:       Appearance: Normal appearance.   HENT:      Head: Normocephalic.   Eyes:      General: Lids are normal.      Extraocular Movements: Extraocular movements intact.      Pupils: Pupils are equal, round, and reactive to light.   Pulmonary:      Effort: Pulmonary effort is normal. No respiratory distress.   Skin:     General: Skin is warm and dry.   Neurological:      Mental Status: He is alert.      Motor: Motor strength is normal.     Deep Tendon Reflexes: Reflexes are normal and symmetric.   Psychiatric:         Speech: Speech normal.        Neurological Exam  Mental Status  Alert. Oriented to person, place, time and situation. Speech is normal. Language is fluent with no aphasia.    Cranial Nerves  CN II: Visual fields full to confrontation.  CN III, IV, VI: Extraocular movements intact bilaterally. Normal lids and orbits bilaterally. Pupils equal round and reactive to light bilaterally.  CN V: Facial sensation is normal.  CN VII: Full and symmetric facial movement.  CN IX, X: Palate elevates symmetrically. Normal gag reflex.  CN XI: Shoulder shrug strength is " normal.  CN XII: Tongue midline without atrophy or fasciculations.    Motor   Strength is 5/5 throughout all four extremities.    Sensory  Light touch is normal in upper and lower extremities.     Reflexes  Deep tendon reflexes are 2+ and symmetric in all four extremities.    Gait  Casual gait is normal including stance, stride, and arm swing.      Result Review :   The following data was reviewed by: SHREYA Duncan on 03/04/2025:  CMP          6/26/2024    09:37 2/28/2025    09:40   CMP   Glucose 98     99       BUN 17     16       Creatinine 1.0     1.0       Sodium 141     139       Potassium 4.4     5.0       Chloride 106     100       Calcium 9.3     9.6       Total Protein 7.4     7.4       Albumin 4.6     4.6       Total Bilirubin 0.4     0.8       Alkaline Phosphatase 128     85       AST (SGOT) 128     42       ALT (SGPT) 175     44       Anion Gap 14     14          Details          This result is from an external source.             CBC w/diff          6/26/2024    09:37 2/28/2025    09:40   CBC w/Diff   WBC 5.5     7.2       RBC 4.42     4.52       Hemoglobin 14.5     14.9       Hematocrit 45.9     46.2       .8     102.2       MCH 32.8     33.0       MCHC 31.6     32.3       RDW 14.4     12.7       Platelets 196     294       Neutrophil Rel % 36.7     52.7       Lymphocyte Rel % 37.8     26.9       Monocyte Rel % 16.2     13.3       Eosinophil Rel % 6.8     4.7       Basophil Rel % 1.8     1.8          Details          This result is from an external source.           LEVETIRACETAM LEVEL (02/28/2025 10:40 EST)       Progress Notes by Quinn Conway APRN (01/15/2024 11:30)                         Impression:  Nathaniel Troy is a 55 y.o. male who presents for follow-up of history of stroke as well as history of seizures.  Patient seizures occurred at the time of his AVM rupture which caused a hemorrhagic stroke.  Been managed on levetiracetam 500 mg in morning at 750 at night for quite  some time.  Denies any issues.  Last levetiracetam level stable at 12 which was performed in February 2025.  No changes to plan of care.    Diagnoses and all orders for this visit:    1. Generalized convulsive seizures (Primary)  -     levETIRAcetam (KEPPRA) 250 MG tablet; Take 1 tablet by mouth every night at bedtime.  Dispense: 90 tablet; Refill: 3  -     levETIRAcetam (KEPPRA) 500 MG tablet; Take 1 tablet by mouth 2 (Two) Times a Day.  Dispense: 180 tablet; Refill: 3    2. History of stroke        Plan:  Continue levetiracetam 500 mg in the morning  Continue levetiracetam 750 mg at night  Routine seizure precautions  No activity restrictions  Follow-up with PCP as scheduled  Follow-up in my clinic 1 year or sooner if needed    The patient and I have discussed the plan of care and he is in full agreement at this time.     Follow Up   Return in about 1 year (around 3/4/2026) for Seizure.            Quinn Conway, SHREYA  03/04/25  15:47 CST

## 2025-06-20 DIAGNOSIS — E03.9 HYPOTHYROIDISM, UNSPECIFIED TYPE: ICD-10-CM

## 2025-06-20 RX ORDER — LEVOTHYROXINE SODIUM 75 UG/1
75 TABLET ORAL DAILY
Qty: 30 TABLET | Refills: 2 | OUTPATIENT
Start: 2025-06-20